# Patient Record
Sex: FEMALE | Employment: OTHER | URBAN - METROPOLITAN AREA
[De-identification: names, ages, dates, MRNs, and addresses within clinical notes are randomized per-mention and may not be internally consistent; named-entity substitution may affect disease eponyms.]

---

## 2019-01-01 ENCOUNTER — HOSPITAL ENCOUNTER (OUTPATIENT)
Age: 84
Setting detail: SPECIMEN
Discharge: HOME | End: 2019-10-30
Payer: MEDICARE

## 2019-01-01 ENCOUNTER — HOSPITAL ENCOUNTER (OUTPATIENT)
Age: 84
Setting detail: SPECIMEN
Discharge: HOME | End: 2019-11-04
Payer: MEDICARE

## 2019-01-01 ENCOUNTER — HOSPITAL ENCOUNTER (OUTPATIENT)
Age: 84
Setting detail: SPECIMEN
Discharge: HOME | End: 2019-10-24
Payer: MEDICARE

## 2019-01-01 LAB
ANION GAP SERPL CALCULATED.3IONS-SCNC: 13 MMOL/L (ref 9–17)
ANION GAP SERPL CALCULATED.3IONS-SCNC: 15 MMOL/L (ref 9–17)
BUN BLDV-MCNC: 43 MG/DL (ref 8–23)
BUN BLDV-MCNC: 56 MG/DL (ref 8–23)
BUN/CREAT BLD: 32 (ref 9–20)
BUN/CREAT BLD: 40 (ref 9–20)
CALCIUM SERPL-MCNC: 9.5 MG/DL (ref 8.6–10.4)
CALCIUM SERPL-MCNC: 9.6 MG/DL (ref 8.6–10.4)
CHLORIDE BLD-SCNC: 92 MMOL/L (ref 98–107)
CHLORIDE BLD-SCNC: 96 MMOL/L (ref 98–107)
CO2: 28 MMOL/L (ref 20–31)
CO2: 31 MMOL/L (ref 20–31)
CREAT SERPL-MCNC: 1.34 MG/DL (ref 0.5–0.9)
CREAT SERPL-MCNC: 1.39 MG/DL (ref 0.5–0.9)
GFR AFRICAN AMERICAN: 43 ML/MIN
GFR AFRICAN AMERICAN: 44 ML/MIN
GFR NON-AFRICAN AMERICAN: 35 ML/MIN
GFR NON-AFRICAN AMERICAN: 37 ML/MIN
GFR SERPL CREATININE-BSD FRML MDRD: ABNORMAL ML/MIN/{1.73_M2}
GLUCOSE BLD-MCNC: 129 MG/DL (ref 70–99)
GLUCOSE BLD-MCNC: 76 MG/DL (ref 70–99)
HCT VFR BLD CALC: 34.3 % (ref 36.3–47.1)
HCT VFR BLD CALC: 37.4 % (ref 36.3–47.1)
HEMOGLOBIN: 11.5 G/DL (ref 11.9–15.1)
HEMOGLOBIN: 12.5 G/DL (ref 11.9–15.1)
MCH RBC QN AUTO: 31.9 PG (ref 25.2–33.5)
MCH RBC QN AUTO: 32.2 PG (ref 25.2–33.5)
MCHC RBC AUTO-ENTMCNC: 33.4 G/DL (ref 28.4–34.8)
MCHC RBC AUTO-ENTMCNC: 33.5 G/DL (ref 28.4–34.8)
MCV RBC AUTO: 95.4 FL (ref 82.6–102.9)
MCV RBC AUTO: 96.1 FL (ref 82.6–102.9)
NRBC AUTOMATED: 0 PER 100 WBC
NRBC AUTOMATED: 0 PER 100 WBC
PDW BLD-RTO: 13.2 % (ref 11.8–14.4)
PDW BLD-RTO: 13.5 % (ref 11.8–14.4)
PLATELET # BLD: 236 K/UL (ref 138–453)
PLATELET # BLD: 245 K/UL (ref 138–453)
PMV BLD AUTO: 11.6 FL (ref 8.1–13.5)
PMV BLD AUTO: 12 FL (ref 8.1–13.5)
POTASSIUM SERPL-SCNC: 3.2 MMOL/L (ref 3.7–5.3)
POTASSIUM SERPL-SCNC: 3.3 MMOL/L (ref 3.7–5.3)
POTASSIUM SERPL-SCNC: 3.3 MMOL/L (ref 3.7–5.3)
POTASSIUM SERPL-SCNC: 3.5 MMOL/L (ref 3.7–5.3)
RBC # BLD: 3.57 M/UL (ref 3.95–5.11)
RBC # BLD: 3.92 M/UL (ref 3.95–5.11)
SODIUM BLD-SCNC: 136 MMOL/L (ref 135–144)
SODIUM BLD-SCNC: 139 MMOL/L (ref 135–144)
WBC # BLD: 5.5 K/UL (ref 3.5–11.3)
WBC # BLD: 5.9 K/UL (ref 3.5–11.3)

## 2019-01-01 PROCEDURE — 80048 BASIC METABOLIC PNL TOTAL CA: CPT

## 2019-01-01 PROCEDURE — 85027 COMPLETE CBC AUTOMATED: CPT

## 2019-01-01 PROCEDURE — P9603 ONE-WAY ALLOW PRORATED MILES: HCPCS

## 2019-01-01 PROCEDURE — 84132 ASSAY OF SERUM POTASSIUM: CPT

## 2019-01-01 PROCEDURE — 36415 COLL VENOUS BLD VENIPUNCTURE: CPT

## 2019-05-31 ENCOUNTER — HOSPITAL ENCOUNTER (OUTPATIENT)
Age: 84
Setting detail: SPECIMEN
Discharge: HOME | End: 2019-05-31
Payer: MEDICARE

## 2019-05-31 LAB
ANION GAP SERPL CALCULATED.3IONS-SCNC: 12 MMOL/L (ref 9–17)
BUN BLDV-MCNC: 46 MG/DL (ref 8–23)
BUN/CREAT BLD: ABNORMAL (ref 9–20)
CALCIUM SERPL-MCNC: 9.5 MG/DL (ref 8.6–10.4)
CHLORIDE BLD-SCNC: 98 MMOL/L (ref 98–107)
CO2: 27 MMOL/L (ref 20–31)
CREAT SERPL-MCNC: 1.22 MG/DL (ref 0.5–0.9)
ESTIMATED AVERAGE GLUCOSE: 123 MG/DL
GFR AFRICAN AMERICAN: 50 ML/MIN
GFR NON-AFRICAN AMERICAN: 41 ML/MIN
GFR SERPL CREATININE-BSD FRML MDRD: ABNORMAL ML/MIN/{1.73_M2}
GFR SERPL CREATININE-BSD FRML MDRD: ABNORMAL ML/MIN/{1.73_M2}
GLUCOSE BLD-MCNC: 73 MG/DL (ref 70–99)
HBA1C MFR BLD: 5.9 % (ref 4–6)
HCT VFR BLD CALC: 36.5 % (ref 36.3–47.1)
HEMOGLOBIN: 11.6 G/DL (ref 11.9–15.1)
MCH RBC QN AUTO: 31 PG (ref 25.2–33.5)
MCHC RBC AUTO-ENTMCNC: 31.8 G/DL (ref 28.4–34.8)
MCV RBC AUTO: 97.6 FL (ref 82.6–102.9)
NRBC AUTOMATED: 0 PER 100 WBC
PDW BLD-RTO: 14.6 % (ref 11.8–14.4)
PLATELET # BLD: 239 K/UL (ref 138–453)
PMV BLD AUTO: 11.6 FL (ref 8.1–13.5)
POTASSIUM SERPL-SCNC: 3.8 MMOL/L (ref 3.7–5.3)
RBC # BLD: 3.74 M/UL (ref 3.95–5.11)
SODIUM BLD-SCNC: 137 MMOL/L (ref 135–144)
T4 TOTAL: 6.9 UG/DL (ref 4.5–12)
TSH SERPL DL<=0.05 MIU/L-ACNC: 3.3 MIU/L (ref 0.3–5)
WBC # BLD: 5.3 K/UL (ref 3.5–11.3)

## 2019-05-31 PROCEDURE — 84436 ASSAY OF TOTAL THYROXINE: CPT

## 2019-05-31 PROCEDURE — 36415 COLL VENOUS BLD VENIPUNCTURE: CPT

## 2019-05-31 PROCEDURE — P9603 ONE-WAY ALLOW PRORATED MILES: HCPCS

## 2019-05-31 PROCEDURE — 85027 COMPLETE CBC AUTOMATED: CPT

## 2019-05-31 PROCEDURE — 83036 HEMOGLOBIN GLYCOSYLATED A1C: CPT

## 2019-05-31 PROCEDURE — 84443 ASSAY THYROID STIM HORMONE: CPT

## 2019-05-31 PROCEDURE — 80048 BASIC METABOLIC PNL TOTAL CA: CPT

## 2019-07-01 ENCOUNTER — HOSPITAL ENCOUNTER (OUTPATIENT)
Age: 84
Setting detail: SPECIMEN
Discharge: HOME | End: 2019-07-01
Payer: MEDICARE

## 2019-07-01 LAB
ANION GAP SERPL CALCULATED.3IONS-SCNC: 17 MMOL/L (ref 9–17)
BNP INTERPRETATION: ABNORMAL
BUN BLDV-MCNC: 45 MG/DL (ref 8–23)
BUN/CREAT BLD: 41 (ref 9–20)
CALCIUM SERPL-MCNC: 9.8 MG/DL (ref 8.6–10.4)
CHLORIDE BLD-SCNC: 94 MMOL/L (ref 98–107)
CO2: 24 MMOL/L (ref 20–31)
CREAT SERPL-MCNC: 1.1 MG/DL (ref 0.5–0.9)
GFR AFRICAN AMERICAN: 56 ML/MIN
GFR NON-AFRICAN AMERICAN: 46 ML/MIN
GFR SERPL CREATININE-BSD FRML MDRD: ABNORMAL ML/MIN/{1.73_M2}
GFR SERPL CREATININE-BSD FRML MDRD: ABNORMAL ML/MIN/{1.73_M2}
GLUCOSE BLD-MCNC: 192 MG/DL (ref 70–99)
HCT VFR BLD CALC: 38.3 % (ref 36.3–47.1)
HEMOGLOBIN: 12.6 G/DL (ref 11.9–15.1)
MCH RBC QN AUTO: 30.7 PG (ref 25.2–33.5)
MCHC RBC AUTO-ENTMCNC: 32.9 G/DL (ref 28.4–34.8)
MCV RBC AUTO: 93.2 FL (ref 82.6–102.9)
NRBC AUTOMATED: 0 PER 100 WBC
PDW BLD-RTO: 14.9 % (ref 11.8–14.4)
PLATELET # BLD: 259 K/UL (ref 138–453)
PMV BLD AUTO: 12 FL (ref 8.1–13.5)
POTASSIUM SERPL-SCNC: 3.1 MMOL/L (ref 3.7–5.3)
PRO-BNP: 3375 PG/ML
RBC # BLD: 4.11 M/UL (ref 3.95–5.11)
SODIUM BLD-SCNC: 135 MMOL/L (ref 135–144)
WBC # BLD: 13.8 K/UL (ref 3.5–11.3)

## 2019-07-01 PROCEDURE — 85027 COMPLETE CBC AUTOMATED: CPT

## 2019-07-01 PROCEDURE — 83880 ASSAY OF NATRIURETIC PEPTIDE: CPT

## 2019-07-01 PROCEDURE — 80048 BASIC METABOLIC PNL TOTAL CA: CPT

## 2019-07-01 PROCEDURE — 36415 COLL VENOUS BLD VENIPUNCTURE: CPT

## 2019-07-01 PROCEDURE — P9603 ONE-WAY ALLOW PRORATED MILES: HCPCS

## 2019-07-03 ENCOUNTER — HOSPITAL ENCOUNTER (OUTPATIENT)
Age: 84
Setting detail: SPECIMEN
Discharge: HOME | End: 2019-07-03
Payer: MEDICARE

## 2019-07-03 LAB
ANION GAP SERPL CALCULATED.3IONS-SCNC: 17 MMOL/L (ref 9–17)
BUN BLDV-MCNC: 46 MG/DL (ref 8–23)
BUN/CREAT BLD: 36 (ref 9–20)
CALCIUM SERPL-MCNC: 9.8 MG/DL (ref 8.6–10.4)
CHLORIDE BLD-SCNC: 95 MMOL/L (ref 98–107)
CO2: 26 MMOL/L (ref 20–31)
CREAT SERPL-MCNC: 1.28 MG/DL (ref 0.5–0.9)
GFR AFRICAN AMERICAN: 47 ML/MIN
GFR NON-AFRICAN AMERICAN: 39 ML/MIN
GFR SERPL CREATININE-BSD FRML MDRD: ABNORMAL ML/MIN/{1.73_M2}
GFR SERPL CREATININE-BSD FRML MDRD: ABNORMAL ML/MIN/{1.73_M2}
GLUCOSE BLD-MCNC: 215 MG/DL (ref 70–99)
HCT VFR BLD CALC: 38.5 % (ref 36.3–47.1)
HEMOGLOBIN: 12.6 G/DL (ref 11.9–15.1)
MCH RBC QN AUTO: 30.2 PG (ref 25.2–33.5)
MCHC RBC AUTO-ENTMCNC: 32.7 G/DL (ref 28.4–34.8)
MCV RBC AUTO: 92.3 FL (ref 82.6–102.9)
NRBC AUTOMATED: 0 PER 100 WBC
PDW BLD-RTO: 15 % (ref 11.8–14.4)
PLATELET # BLD: 288 K/UL (ref 138–453)
PMV BLD AUTO: 11.9 FL (ref 8.1–13.5)
POTASSIUM SERPL-SCNC: 3.6 MMOL/L (ref 3.7–5.3)
RBC # BLD: 4.17 M/UL (ref 3.95–5.11)
SODIUM BLD-SCNC: 138 MMOL/L (ref 135–144)
WBC # BLD: 10.1 K/UL (ref 3.5–11.3)

## 2019-07-03 PROCEDURE — 85027 COMPLETE CBC AUTOMATED: CPT

## 2019-07-03 PROCEDURE — P9603 ONE-WAY ALLOW PRORATED MILES: HCPCS

## 2019-07-03 PROCEDURE — 36415 COLL VENOUS BLD VENIPUNCTURE: CPT

## 2019-07-03 PROCEDURE — 80048 BASIC METABOLIC PNL TOTAL CA: CPT

## 2019-07-05 ENCOUNTER — HOSPITAL ENCOUNTER (OUTPATIENT)
Age: 84
Setting detail: SPECIMEN
Discharge: HOME | End: 2019-07-05
Payer: MEDICARE

## 2019-07-05 LAB
ANION GAP SERPL CALCULATED.3IONS-SCNC: 15 MMOL/L (ref 9–17)
BNP INTERPRETATION: ABNORMAL
BUN BLDV-MCNC: 50 MG/DL (ref 8–23)
BUN/CREAT BLD: 36 (ref 9–20)
CALCIUM SERPL-MCNC: 9.3 MG/DL (ref 8.6–10.4)
CHLORIDE BLD-SCNC: 91 MMOL/L (ref 98–107)
CO2: 29 MMOL/L (ref 20–31)
CREAT SERPL-MCNC: 1.4 MG/DL (ref 0.5–0.9)
GFR AFRICAN AMERICAN: 42 ML/MIN
GFR NON-AFRICAN AMERICAN: 35 ML/MIN
GFR SERPL CREATININE-BSD FRML MDRD: ABNORMAL ML/MIN/{1.73_M2}
GFR SERPL CREATININE-BSD FRML MDRD: ABNORMAL ML/MIN/{1.73_M2}
GLUCOSE BLD-MCNC: 228 MG/DL (ref 70–99)
HCT VFR BLD CALC: 39.2 % (ref 36.3–47.1)
HEMOGLOBIN: 12.7 G/DL (ref 11.9–15.1)
MCH RBC QN AUTO: 30.1 PG (ref 25.2–33.5)
MCHC RBC AUTO-ENTMCNC: 32.4 G/DL (ref 28.4–34.8)
MCV RBC AUTO: 92.9 FL (ref 82.6–102.9)
NRBC AUTOMATED: 0 PER 100 WBC
PDW BLD-RTO: 15.3 % (ref 11.8–14.4)
PLATELET # BLD: 264 K/UL (ref 138–453)
PMV BLD AUTO: 12.2 FL (ref 8.1–13.5)
POTASSIUM SERPL-SCNC: 3.5 MMOL/L (ref 3.7–5.3)
PRO-BNP: 1083 PG/ML
RBC # BLD: 4.22 M/UL (ref 3.95–5.11)
SODIUM BLD-SCNC: 135 MMOL/L (ref 135–144)
WBC # BLD: 10.3 K/UL (ref 3.5–11.3)

## 2019-07-05 PROCEDURE — P9603 ONE-WAY ALLOW PRORATED MILES: HCPCS

## 2019-07-05 PROCEDURE — 83880 ASSAY OF NATRIURETIC PEPTIDE: CPT

## 2019-07-05 PROCEDURE — 36415 COLL VENOUS BLD VENIPUNCTURE: CPT

## 2019-07-05 PROCEDURE — 85027 COMPLETE CBC AUTOMATED: CPT

## 2019-07-05 PROCEDURE — 80048 BASIC METABOLIC PNL TOTAL CA: CPT

## 2019-07-08 ENCOUNTER — HOSPITAL ENCOUNTER (OUTPATIENT)
Age: 84
Setting detail: SPECIMEN
Discharge: HOME | End: 2019-07-08
Payer: MEDICARE

## 2019-07-08 LAB
ANION GAP SERPL CALCULATED.3IONS-SCNC: 20 MMOL/L (ref 9–17)
BNP INTERPRETATION: ABNORMAL
BUN BLDV-MCNC: 53 MG/DL (ref 8–23)
BUN/CREAT BLD: 31 (ref 9–20)
CALCIUM SERPL-MCNC: 9.6 MG/DL (ref 8.6–10.4)
CHLORIDE BLD-SCNC: 88 MMOL/L (ref 98–107)
CO2: 28 MMOL/L (ref 20–31)
CREAT SERPL-MCNC: 1.72 MG/DL (ref 0.5–0.9)
GFR AFRICAN AMERICAN: 33 ML/MIN
GFR NON-AFRICAN AMERICAN: 28 ML/MIN
GFR SERPL CREATININE-BSD FRML MDRD: ABNORMAL ML/MIN/{1.73_M2}
GFR SERPL CREATININE-BSD FRML MDRD: ABNORMAL ML/MIN/{1.73_M2}
GLUCOSE BLD-MCNC: 212 MG/DL (ref 70–99)
HCT VFR BLD CALC: 43.6 % (ref 36.3–47.1)
HEMOGLOBIN: 14.1 G/DL (ref 11.9–15.1)
MCH RBC QN AUTO: 29.9 PG (ref 25.2–33.5)
MCHC RBC AUTO-ENTMCNC: 32.3 G/DL (ref 28.4–34.8)
MCV RBC AUTO: 92.6 FL (ref 82.6–102.9)
NRBC AUTOMATED: 0 PER 100 WBC
PDW BLD-RTO: 14.8 % (ref 11.8–14.4)
PLATELET # BLD: 346 K/UL (ref 138–453)
PMV BLD AUTO: 11.8 FL (ref 8.1–13.5)
POTASSIUM SERPL-SCNC: 3.7 MMOL/L (ref 3.7–5.3)
PRO-BNP: 916 PG/ML
RBC # BLD: 4.71 M/UL (ref 3.95–5.11)
SODIUM BLD-SCNC: 136 MMOL/L (ref 135–144)
WBC # BLD: 10.6 K/UL (ref 3.5–11.3)

## 2019-07-08 PROCEDURE — 83880 ASSAY OF NATRIURETIC PEPTIDE: CPT

## 2019-07-08 PROCEDURE — 36415 COLL VENOUS BLD VENIPUNCTURE: CPT

## 2019-07-08 PROCEDURE — 80048 BASIC METABOLIC PNL TOTAL CA: CPT

## 2019-07-08 PROCEDURE — P9603 ONE-WAY ALLOW PRORATED MILES: HCPCS

## 2019-07-08 PROCEDURE — 85027 COMPLETE CBC AUTOMATED: CPT

## 2019-07-29 ENCOUNTER — HOSPITAL ENCOUNTER (OUTPATIENT)
Age: 84
Setting detail: SPECIMEN
Discharge: HOME | End: 2019-07-29
Payer: MEDICARE

## 2019-07-29 LAB
ANION GAP SERPL CALCULATED.3IONS-SCNC: 18 MMOL/L (ref 9–17)
BNP INTERPRETATION: ABNORMAL
BUN BLDV-MCNC: 69 MG/DL (ref 8–23)
BUN/CREAT BLD: ABNORMAL (ref 9–20)
CALCIUM SERPL-MCNC: 9.5 MG/DL (ref 8.6–10.4)
CHLORIDE BLD-SCNC: 89 MMOL/L (ref 98–107)
CO2: 26 MMOL/L (ref 20–31)
CREAT SERPL-MCNC: 1.91 MG/DL (ref 0.5–0.9)
GFR AFRICAN AMERICAN: 30 ML/MIN
GFR NON-AFRICAN AMERICAN: 24 ML/MIN
GFR SERPL CREATININE-BSD FRML MDRD: ABNORMAL ML/MIN/{1.73_M2}
GFR SERPL CREATININE-BSD FRML MDRD: ABNORMAL ML/MIN/{1.73_M2}
GLUCOSE BLD-MCNC: 282 MG/DL (ref 70–99)
HCT VFR BLD CALC: 40.5 % (ref 36.3–47.1)
HEMOGLOBIN: 13.4 G/DL (ref 11.9–15.1)
MCH RBC QN AUTO: 31.9 PG (ref 25.2–33.5)
MCHC RBC AUTO-ENTMCNC: 33.1 G/DL (ref 28.4–34.8)
MCV RBC AUTO: 96.4 FL (ref 82.6–102.9)
NRBC AUTOMATED: 0 PER 100 WBC
PDW BLD-RTO: 14.5 % (ref 11.8–14.4)
PLATELET # BLD: 263 K/UL (ref 138–453)
PMV BLD AUTO: 12.8 FL (ref 8.1–13.5)
POTASSIUM SERPL-SCNC: 3.7 MMOL/L (ref 3.7–5.3)
PRO-BNP: 1115 PG/ML
RBC # BLD: 4.2 M/UL (ref 3.95–5.11)
SODIUM BLD-SCNC: 133 MMOL/L (ref 135–144)
WBC # BLD: 6.4 K/UL (ref 3.5–11.3)

## 2019-07-29 PROCEDURE — 83880 ASSAY OF NATRIURETIC PEPTIDE: CPT

## 2019-07-29 PROCEDURE — 80048 BASIC METABOLIC PNL TOTAL CA: CPT

## 2019-07-29 PROCEDURE — P9603 ONE-WAY ALLOW PRORATED MILES: HCPCS

## 2019-07-29 PROCEDURE — 36415 COLL VENOUS BLD VENIPUNCTURE: CPT

## 2019-07-29 PROCEDURE — 85027 COMPLETE CBC AUTOMATED: CPT

## 2019-07-31 ENCOUNTER — HOSPITAL ENCOUNTER (OUTPATIENT)
Age: 84
Setting detail: SPECIMEN
Discharge: HOME | End: 2019-07-31
Payer: MEDICARE

## 2019-07-31 LAB
ANION GAP SERPL CALCULATED.3IONS-SCNC: 16 MMOL/L (ref 9–17)
BUN BLDV-MCNC: 70 MG/DL (ref 8–23)
BUN/CREAT BLD: ABNORMAL (ref 9–20)
CALCIUM SERPL-MCNC: 9.4 MG/DL (ref 8.6–10.4)
CHLORIDE BLD-SCNC: 92 MMOL/L (ref 98–107)
CO2: 29 MMOL/L (ref 20–31)
CREAT SERPL-MCNC: 1.84 MG/DL (ref 0.5–0.9)
GFR AFRICAN AMERICAN: 31 ML/MIN
GFR NON-AFRICAN AMERICAN: 25 ML/MIN
GFR SERPL CREATININE-BSD FRML MDRD: ABNORMAL ML/MIN/{1.73_M2}
GFR SERPL CREATININE-BSD FRML MDRD: ABNORMAL ML/MIN/{1.73_M2}
GLUCOSE BLD-MCNC: 90 MG/DL (ref 70–99)
POTASSIUM SERPL-SCNC: 3.8 MMOL/L (ref 3.7–5.3)
SODIUM BLD-SCNC: 137 MMOL/L (ref 135–144)

## 2019-07-31 PROCEDURE — P9603 ONE-WAY ALLOW PRORATED MILES: HCPCS

## 2019-07-31 PROCEDURE — 80048 BASIC METABOLIC PNL TOTAL CA: CPT

## 2019-07-31 PROCEDURE — 36415 COLL VENOUS BLD VENIPUNCTURE: CPT

## 2019-08-06 ENCOUNTER — HOSPITAL ENCOUNTER (OUTPATIENT)
Age: 84
Setting detail: SPECIMEN
Discharge: HOME | End: 2019-08-06
Payer: MEDICARE

## 2019-08-06 LAB
ANION GAP SERPL CALCULATED.3IONS-SCNC: 15 MMOL/L (ref 9–17)
BUN BLDV-MCNC: 65 MG/DL (ref 8–23)
BUN/CREAT BLD: ABNORMAL (ref 9–20)
CALCIUM SERPL-MCNC: 9.4 MG/DL (ref 8.6–10.4)
CHLORIDE BLD-SCNC: 88 MMOL/L (ref 98–107)
CO2: 30 MMOL/L (ref 20–31)
CREAT SERPL-MCNC: 1.82 MG/DL (ref 0.5–0.9)
GFR AFRICAN AMERICAN: 31 ML/MIN
GFR NON-AFRICAN AMERICAN: 26 ML/MIN
GFR SERPL CREATININE-BSD FRML MDRD: ABNORMAL ML/MIN/{1.73_M2}
GFR SERPL CREATININE-BSD FRML MDRD: ABNORMAL ML/MIN/{1.73_M2}
GLUCOSE BLD-MCNC: 201 MG/DL (ref 70–99)
POTASSIUM SERPL-SCNC: 3.8 MMOL/L (ref 3.7–5.3)
SODIUM BLD-SCNC: 133 MMOL/L (ref 135–144)

## 2019-08-06 PROCEDURE — 80048 BASIC METABOLIC PNL TOTAL CA: CPT

## 2019-08-06 PROCEDURE — P9603 ONE-WAY ALLOW PRORATED MILES: HCPCS

## 2019-08-06 PROCEDURE — 36415 COLL VENOUS BLD VENIPUNCTURE: CPT

## 2020-01-01 ENCOUNTER — APPOINTMENT (OUTPATIENT)
Dept: CT IMAGING | Age: 85
DRG: 871 | End: 2020-01-01
Payer: MEDICARE

## 2020-01-01 ENCOUNTER — HOSPITAL ENCOUNTER (INPATIENT)
Age: 85
LOS: 1 days | DRG: 177 | End: 2020-08-15
Attending: INTERNAL MEDICINE | Admitting: INTERNAL MEDICINE
Payer: COMMERCIAL

## 2020-01-01 ENCOUNTER — APPOINTMENT (OUTPATIENT)
Dept: GENERAL RADIOLOGY | Age: 85
DRG: 871 | End: 2020-01-01
Payer: MEDICARE

## 2020-01-01 ENCOUNTER — HOSPITAL ENCOUNTER (INPATIENT)
Age: 85
LOS: 8 days | Discharge: HOSPICE | DRG: 871 | End: 2020-08-14
Attending: EMERGENCY MEDICINE | Admitting: INTERNAL MEDICINE
Payer: MEDICARE

## 2020-01-01 VITALS
HEART RATE: 114 BPM | DIASTOLIC BLOOD PRESSURE: 31 MMHG | SYSTOLIC BLOOD PRESSURE: 112 MMHG | TEMPERATURE: 98.9 F | OXYGEN SATURATION: 86 % | RESPIRATION RATE: 44 BRPM

## 2020-01-01 VITALS
WEIGHT: 169.75 LBS | RESPIRATION RATE: 28 BRPM | BODY MASS INDEX: 30.08 KG/M2 | SYSTOLIC BLOOD PRESSURE: 120 MMHG | DIASTOLIC BLOOD PRESSURE: 55 MMHG | TEMPERATURE: 97.3 F | HEIGHT: 63 IN | OXYGEN SATURATION: 98 % | HEART RATE: 89 BPM

## 2020-01-01 LAB
-: NORMAL
ABO/RH: NORMAL
ABSOLUTE EOS #: 0 K/UL (ref 0–0.4)
ABSOLUTE EOS #: 0 K/UL (ref 0–0.44)
ABSOLUTE EOS #: 0.08 K/UL (ref 0–0.44)
ABSOLUTE EOS #: <0.03 K/UL (ref 0–0.44)
ABSOLUTE IMMATURE GRANULOCYTE: 0.1 K/UL (ref 0–0.3)
ABSOLUTE IMMATURE GRANULOCYTE: 0.23 K/UL (ref 0–0.3)
ABSOLUTE IMMATURE GRANULOCYTE: 0.29 K/UL (ref 0–0.3)
ABSOLUTE IMMATURE GRANULOCYTE: 0.32 K/UL (ref 0–0.3)
ABSOLUTE IMMATURE GRANULOCYTE: 0.44 K/UL (ref 0–0.3)
ABSOLUTE IMMATURE GRANULOCYTE: 0.83 K/UL (ref 0–0.3)
ABSOLUTE LYMPH #: 0.1 K/UL (ref 1–4.8)
ABSOLUTE LYMPH #: 0.2 K/UL (ref 1–4.8)
ABSOLUTE LYMPH #: 0.22 K/UL (ref 1.1–3.7)
ABSOLUTE LYMPH #: 0.39 K/UL (ref 1.1–3.7)
ABSOLUTE LYMPH #: 0.5 K/UL (ref 1.1–3.7)
ABSOLUTE LYMPH #: 0.64 K/UL (ref 1–4.8)
ABSOLUTE MONO #: 0.29 K/UL (ref 0.1–0.8)
ABSOLUTE MONO #: 0.43 K/UL (ref 0.1–0.8)
ABSOLUTE MONO #: 0.46 K/UL (ref 0.1–1.2)
ABSOLUTE MONO #: 0.59 K/UL (ref 0.1–0.8)
ABSOLUTE MONO #: 0.7 K/UL (ref 0.1–1.2)
ABSOLUTE MONO #: 0.83 K/UL (ref 0.1–1.2)
ACTION: NORMAL
ALBUMIN SERPL-MCNC: 2 G/DL (ref 3.5–5.2)
ALBUMIN SERPL-MCNC: 2.1 G/DL (ref 3.5–5.2)
ALBUMIN SERPL-MCNC: 2.1 G/DL (ref 3.5–5.2)
ALBUMIN SERPL-MCNC: 2.4 G/DL (ref 3.5–5.2)
ALBUMIN SERPL-MCNC: 2.5 G/DL (ref 3.5–5.2)
ALBUMIN SERPL-MCNC: 2.6 G/DL (ref 3.5–5.2)
ALBUMIN SERPL-MCNC: 2.7 G/DL (ref 3.5–5.2)
ALBUMIN SERPL-MCNC: 2.9 G/DL (ref 3.5–5.2)
ALBUMIN/GLOBULIN RATIO: 0.8 (ref 1–2.5)
ALBUMIN/GLOBULIN RATIO: 0.8 (ref 1–2.5)
ALBUMIN/GLOBULIN RATIO: 0.9 (ref 1–2.5)
ALBUMIN/GLOBULIN RATIO: 1.1 (ref 1–2.5)
ALBUMIN/GLOBULIN RATIO: 1.2 (ref 1–2.5)
ALBUMIN/GLOBULIN RATIO: 1.3 (ref 1–2.5)
ALLEN TEST: ABNORMAL
ALLEN TEST: POSITIVE
ALP BLD-CCNC: 118 U/L (ref 35–104)
ALP BLD-CCNC: 118 U/L (ref 35–104)
ALP BLD-CCNC: 120 U/L (ref 35–104)
ALP BLD-CCNC: 122 U/L (ref 35–104)
ALP BLD-CCNC: 138 U/L (ref 35–104)
ALP BLD-CCNC: 138 U/L (ref 35–104)
ALP BLD-CCNC: 144 U/L (ref 35–104)
ALP BLD-CCNC: 158 U/L (ref 35–104)
ALT SERPL-CCNC: 30 U/L (ref 5–33)
ALT SERPL-CCNC: 33 U/L (ref 5–33)
ALT SERPL-CCNC: 33 U/L (ref 5–33)
ALT SERPL-CCNC: 39 U/L (ref 5–33)
ALT SERPL-CCNC: 43 U/L (ref 5–33)
ALT SERPL-CCNC: 47 U/L (ref 5–33)
ALT SERPL-CCNC: 51 U/L (ref 5–33)
ALT SERPL-CCNC: 70 U/L (ref 5–33)
AMORPHOUS: NORMAL
ANION GAP SERPL CALCULATED.3IONS-SCNC: 10 MMOL/L (ref 9–17)
ANION GAP SERPL CALCULATED.3IONS-SCNC: 11 MMOL/L (ref 9–17)
ANION GAP SERPL CALCULATED.3IONS-SCNC: 11 MMOL/L (ref 9–17)
ANION GAP SERPL CALCULATED.3IONS-SCNC: 12 MMOL/L (ref 9–17)
ANION GAP SERPL CALCULATED.3IONS-SCNC: 14 MMOL/L (ref 9–17)
ANION GAP SERPL CALCULATED.3IONS-SCNC: 15 MMOL/L (ref 9–17)
ANION GAP SERPL CALCULATED.3IONS-SCNC: 19 MMOL/L (ref 9–17)
ANION GAP SERPL CALCULATED.3IONS-SCNC: 8 MMOL/L (ref 9–17)
ANION GAP: 12 MMOL/L (ref 7–16)
AST SERPL-CCNC: 28 U/L
AST SERPL-CCNC: 29 U/L
AST SERPL-CCNC: 30 U/L
AST SERPL-CCNC: 41 U/L
AST SERPL-CCNC: 45 U/L
AST SERPL-CCNC: 64 U/L
AST SERPL-CCNC: 73 U/L
AST SERPL-CCNC: 83 U/L
BACTERIA: NORMAL
BASOPHILS # BLD: 0 % (ref 0–2)
BASOPHILS # BLD: 1 % (ref 0–2)
BASOPHILS ABSOLUTE: 0 K/UL (ref 0–0.2)
BASOPHILS ABSOLUTE: 0.1 K/UL (ref 0–0.2)
BASOPHILS ABSOLUTE: <0.03 K/UL (ref 0–0.2)
BILIRUB SERPL-MCNC: 0.41 MG/DL (ref 0.3–1.2)
BILIRUB SERPL-MCNC: 0.43 MG/DL (ref 0.3–1.2)
BILIRUB SERPL-MCNC: 0.44 MG/DL (ref 0.3–1.2)
BILIRUB SERPL-MCNC: 0.49 MG/DL (ref 0.3–1.2)
BILIRUB SERPL-MCNC: 0.54 MG/DL (ref 0.3–1.2)
BILIRUB SERPL-MCNC: 0.62 MG/DL (ref 0.3–1.2)
BILIRUB SERPL-MCNC: 0.65 MG/DL (ref 0.3–1.2)
BILIRUB SERPL-MCNC: 0.68 MG/DL (ref 0.3–1.2)
BILIRUBIN DIRECT: 0.3 MG/DL
BILIRUBIN URINE: NEGATIVE
BILIRUBIN, INDIRECT: 0.38 MG/DL (ref 0–1)
BLD PROD TYP BPU: NORMAL
BLD PROD TYP BPU: NORMAL
BNP INTERPRETATION: ABNORMAL
BUN BLDV-MCNC: 30 MG/DL (ref 8–23)
BUN BLDV-MCNC: 31 MG/DL (ref 8–23)
BUN BLDV-MCNC: 33 MG/DL (ref 8–23)
BUN BLDV-MCNC: 37 MG/DL (ref 8–23)
BUN BLDV-MCNC: 54 MG/DL (ref 8–23)
BUN BLDV-MCNC: 74 MG/DL (ref 8–23)
BUN BLDV-MCNC: 82 MG/DL (ref 8–23)
BUN BLDV-MCNC: 83 MG/DL (ref 8–23)
BUN/CREAT BLD: ABNORMAL (ref 9–20)
C-REACTIVE PROTEIN: 186.9 MG/L (ref 0–5)
CALCIUM SERPL-MCNC: 8.3 MG/DL (ref 8.6–10.4)
CALCIUM SERPL-MCNC: 8.5 MG/DL (ref 8.6–10.4)
CALCIUM SERPL-MCNC: 8.6 MG/DL (ref 8.6–10.4)
CALCIUM SERPL-MCNC: 8.7 MG/DL (ref 8.6–10.4)
CALCIUM SERPL-MCNC: 8.8 MG/DL (ref 8.6–10.4)
CALCIUM SERPL-MCNC: 8.9 MG/DL (ref 8.6–10.4)
CALCIUM SERPL-MCNC: 9 MG/DL (ref 8.6–10.4)
CALCIUM SERPL-MCNC: 9.1 MG/DL (ref 8.6–10.4)
CASTS UA: NORMAL /LPF (ref 0–8)
CHLORIDE BLD-SCNC: 100 MMOL/L (ref 98–107)
CHLORIDE BLD-SCNC: 101 MMOL/L (ref 98–107)
CHLORIDE BLD-SCNC: 105 MMOL/L (ref 98–107)
CHLORIDE BLD-SCNC: 107 MMOL/L (ref 98–107)
CHLORIDE BLD-SCNC: 109 MMOL/L (ref 98–107)
CHLORIDE BLD-SCNC: 110 MMOL/L (ref 98–107)
CHLORIDE BLD-SCNC: 110 MMOL/L (ref 98–107)
CHLORIDE BLD-SCNC: 112 MMOL/L (ref 98–107)
CO2: 17 MMOL/L (ref 20–31)
CO2: 18 MMOL/L (ref 20–31)
CO2: 20 MMOL/L (ref 20–31)
CO2: 21 MMOL/L (ref 20–31)
CO2: 22 MMOL/L (ref 20–31)
COLOR: YELLOW
COMMENT UA: ABNORMAL
CORTISOL COLLECTION INFO: ABNORMAL
CORTISOL: 25.1 UG/DL (ref 2.7–18.4)
CREAT SERPL-MCNC: 1.08 MG/DL (ref 0.5–0.9)
CREAT SERPL-MCNC: 1.11 MG/DL (ref 0.5–0.9)
CREAT SERPL-MCNC: 1.28 MG/DL (ref 0.5–0.9)
CREAT SERPL-MCNC: 1.38 MG/DL (ref 0.5–0.9)
CREAT SERPL-MCNC: 1.72 MG/DL (ref 0.5–0.9)
CREAT SERPL-MCNC: 2.11 MG/DL (ref 0.5–0.9)
CREAT SERPL-MCNC: 2.59 MG/DL (ref 0.5–0.9)
CREAT SERPL-MCNC: 2.59 MG/DL (ref 0.5–0.9)
CRYSTALS, UA: NORMAL /HPF
CULTURE: NORMAL
CULTURE: NORMAL
D-DIMER QUANTITATIVE: 1.31 MG/L FEU
D-DIMER QUANTITATIVE: 1.85 MG/L FEU
D-DIMER QUANTITATIVE: 3.53 MG/L FEU
D-DIMER QUANTITATIVE: 4.56 MG/L FEU
DATE AND TIME: NORMAL
DIFFERENTIAL TYPE: ABNORMAL
DISPENSE STATUS BLOOD BANK: NORMAL
DISPENSE STATUS BLOOD BANK: NORMAL
EKG ATRIAL RATE: 104 BPM
EKG ATRIAL RATE: 125 BPM
EKG ATRIAL RATE: 71 BPM
EKG P AXIS: 57 DEGREES
EKG P AXIS: 76 DEGREES
EKG P-R INTERVAL: 200 MS
EKG Q-T INTERVAL: 290 MS
EKG Q-T INTERVAL: 340 MS
EKG Q-T INTERVAL: 400 MS
EKG QRS DURATION: 70 MS
EKG QRS DURATION: 70 MS
EKG QRS DURATION: 76 MS
EKG QTC CALCULATION (BAZETT): 424 MS
EKG QTC CALCULATION (BAZETT): 447 MS
EKG QTC CALCULATION (BAZETT): 572 MS
EKG R AXIS: 0 DEGREES
EKG R AXIS: 32 DEGREES
EKG R AXIS: 52 DEGREES
EKG T AXIS: 56 DEGREES
EKG T AXIS: 58 DEGREES
EKG T AXIS: 81 DEGREES
EKG VENTRICULAR RATE: 104 BPM
EKG VENTRICULAR RATE: 123 BPM
EKG VENTRICULAR RATE: 129 BPM
EOSINOPHILS RELATIVE PERCENT: 0 % (ref 1–4)
EOSINOPHILS RELATIVE PERCENT: 1 % (ref 1–4)
EPITHELIAL CELLS UA: NORMAL /HPF (ref 0–5)
FERRITIN: 542 UG/L (ref 13–150)
FERRITIN: 619 UG/L (ref 13–150)
FIBRINOGEN: 558 MG/DL (ref 140–420)
FIO2: ABNORMAL
FIO2: ABNORMAL
FOLATE: 4.1 NG/ML
GFR AFRICAN AMERICAN: 21 ML/MIN
GFR AFRICAN AMERICAN: 21 ML/MIN
GFR AFRICAN AMERICAN: 26 ML/MIN
GFR AFRICAN AMERICAN: 33 ML/MIN
GFR AFRICAN AMERICAN: 43 ML/MIN
GFR AFRICAN AMERICAN: 47 ML/MIN
GFR AFRICAN AMERICAN: 55 ML/MIN
GFR AFRICAN AMERICAN: 57 ML/MIN
GFR NON-AFRICAN AMERICAN: 14 ML/MIN
GFR NON-AFRICAN AMERICAN: 17 ML/MIN
GFR NON-AFRICAN AMERICAN: 17 ML/MIN
GFR NON-AFRICAN AMERICAN: 22 ML/MIN
GFR NON-AFRICAN AMERICAN: 27 ML/MIN
GFR NON-AFRICAN AMERICAN: 35 ML/MIN
GFR NON-AFRICAN AMERICAN: 39 ML/MIN
GFR NON-AFRICAN AMERICAN: 46 ML/MIN
GFR NON-AFRICAN AMERICAN: 47 ML/MIN
GFR SERPL CREATININE-BSD FRML MDRD: 17 ML/MIN
GFR SERPL CREATININE-BSD FRML MDRD: ABNORMAL ML/MIN/{1.73_M2}
GLOBULIN: ABNORMAL G/DL (ref 1.5–3.8)
GLUCOSE BLD-MCNC: 106 MG/DL (ref 70–99)
GLUCOSE BLD-MCNC: 111 MG/DL (ref 65–105)
GLUCOSE BLD-MCNC: 111 MG/DL (ref 65–105)
GLUCOSE BLD-MCNC: 117 MG/DL (ref 70–99)
GLUCOSE BLD-MCNC: 120 MG/DL (ref 65–105)
GLUCOSE BLD-MCNC: 124 MG/DL (ref 65–105)
GLUCOSE BLD-MCNC: 128 MG/DL (ref 65–105)
GLUCOSE BLD-MCNC: 136 MG/DL (ref 65–105)
GLUCOSE BLD-MCNC: 136 MG/DL (ref 65–105)
GLUCOSE BLD-MCNC: 139 MG/DL (ref 65–105)
GLUCOSE BLD-MCNC: 150 MG/DL (ref 65–105)
GLUCOSE BLD-MCNC: 150 MG/DL (ref 70–99)
GLUCOSE BLD-MCNC: 157 MG/DL (ref 65–105)
GLUCOSE BLD-MCNC: 158 MG/DL (ref 74–100)
GLUCOSE BLD-MCNC: 202 MG/DL (ref 65–105)
GLUCOSE BLD-MCNC: 252 MG/DL (ref 70–99)
GLUCOSE BLD-MCNC: 261 MG/DL (ref 74–100)
GLUCOSE BLD-MCNC: 313 MG/DL (ref 65–105)
GLUCOSE BLD-MCNC: 337 MG/DL (ref 70–99)
GLUCOSE BLD-MCNC: 40 MG/DL (ref 70–99)
GLUCOSE BLD-MCNC: 68 MG/DL (ref 70–99)
GLUCOSE BLD-MCNC: 74 MG/DL (ref 65–105)
GLUCOSE BLD-MCNC: 81 MG/DL (ref 65–105)
GLUCOSE BLD-MCNC: 86 MG/DL (ref 65–105)
GLUCOSE BLD-MCNC: 86 MG/DL (ref 70–99)
GLUCOSE BLD-MCNC: 90 MG/DL (ref 65–105)
GLUCOSE BLD-MCNC: 95 MG/DL (ref 65–105)
GLUCOSE URINE: NEGATIVE
HCO3 VENOUS: 21 MMOL/L (ref 22–29)
HCT VFR BLD CALC: 29.2 % (ref 36.3–47.1)
HCT VFR BLD CALC: 29.5 % (ref 36.3–47.1)
HCT VFR BLD CALC: 29.5 % (ref 36.3–47.1)
HCT VFR BLD CALC: 30 % (ref 36.3–47.1)
HCT VFR BLD CALC: 31.3 % (ref 36.3–47.1)
HCT VFR BLD CALC: 31.9 % (ref 36.3–47.1)
HEMOGLOBIN: 10.2 G/DL (ref 11.9–15.1)
HEMOGLOBIN: 10.4 G/DL (ref 11.9–15.1)
HEMOGLOBIN: 9.5 G/DL (ref 11.9–15.1)
HEMOGLOBIN: 9.7 G/DL (ref 11.9–15.1)
HEMOGLOBIN: 9.8 G/DL (ref 11.9–15.1)
HEMOGLOBIN: 9.8 G/DL (ref 11.9–15.1)
IMMATURE GRANULOCYTES: 1 %
IMMATURE GRANULOCYTES: 3 %
IMMATURE GRANULOCYTES: 4 %
IMMATURE GRANULOCYTES: 5 %
INR BLD: 1
INR BLD: 1
KETONES, URINE: NEGATIVE
LACTATE DEHYDROGENASE: 249 U/L (ref 135–214)
LACTIC ACID, SEPSIS WHOLE BLOOD: 3 MMOL/L (ref 0.5–1.9)
LACTIC ACID, SEPSIS: ABNORMAL MMOL/L (ref 0.5–1.9)
LACTIC ACID, WHOLE BLOOD: 1.2 MMOL/L (ref 0.7–2.1)
LACTIC ACID, WHOLE BLOOD: 5.2 MMOL/L (ref 0.7–2.1)
LACTIC ACID: ABNORMAL MMOL/L
LEUKOCYTE ESTERASE, URINE: NEGATIVE
LYMPHOCYTES # BLD: 1 % (ref 24–44)
LYMPHOCYTES # BLD: 2 % (ref 24–43)
LYMPHOCYTES # BLD: 2 % (ref 24–44)
LYMPHOCYTES # BLD: 3 % (ref 24–43)
LYMPHOCYTES # BLD: 5 % (ref 24–43)
LYMPHOCYTES # BLD: 6 % (ref 24–44)
Lab: NORMAL
Lab: NORMAL
MCH RBC QN AUTO: 32.3 PG (ref 25.2–33.5)
MCH RBC QN AUTO: 32.5 PG (ref 25.2–33.5)
MCH RBC QN AUTO: 32.6 PG (ref 25.2–33.5)
MCH RBC QN AUTO: 32.8 PG (ref 25.2–33.5)
MCH RBC QN AUTO: 33 PG (ref 25.2–33.5)
MCH RBC QN AUTO: 33.1 PG (ref 25.2–33.5)
MCHC RBC AUTO-ENTMCNC: 32.5 G/DL (ref 28.4–34.8)
MCHC RBC AUTO-ENTMCNC: 32.6 G/DL (ref 28.4–34.8)
MCHC RBC AUTO-ENTMCNC: 32.6 G/DL (ref 28.4–34.8)
MCHC RBC AUTO-ENTMCNC: 32.7 G/DL (ref 28.4–34.8)
MCHC RBC AUTO-ENTMCNC: 32.9 G/DL (ref 28.4–34.8)
MCHC RBC AUTO-ENTMCNC: 33.2 G/DL (ref 28.4–34.8)
MCV RBC AUTO: 100 FL (ref 82.6–102.9)
MCV RBC AUTO: 100.7 FL (ref 82.6–102.9)
MCV RBC AUTO: 101.6 FL (ref 82.6–102.9)
MCV RBC AUTO: 98.3 FL (ref 82.6–102.9)
MCV RBC AUTO: 99.3 FL (ref 82.6–102.9)
MCV RBC AUTO: 99.3 FL (ref 82.6–102.9)
MODE: ABNORMAL
MODE: ABNORMAL
MONOCYTES # BLD: 3 % (ref 1–7)
MONOCYTES # BLD: 4 % (ref 1–7)
MONOCYTES # BLD: 5 % (ref 3–12)
MONOCYTES # BLD: 6 % (ref 1–7)
MONOCYTES # BLD: 6 % (ref 3–12)
MONOCYTES # BLD: 7 % (ref 3–12)
MORPHOLOGY: ABNORMAL
MORPHOLOGY: NORMAL
MUCUS: NORMAL
NEGATIVE BASE EXCESS, ART: 4 (ref 0–2)
NEGATIVE BASE EXCESS, VEN: 5 (ref 0–2)
NITRITE, URINE: NEGATIVE
NOTIFY: NORMAL
NRBC AUTOMATED: 0 PER 100 WBC
O2 DEVICE/FLOW/%: ABNORMAL
O2 DEVICE/FLOW/%: ABNORMAL
O2 SAT, VEN: 78 % (ref 60–85)
OTHER OBSERVATIONS UA: NORMAL
PARTIAL THROMBOPLASTIN TIME: 21.2 SEC (ref 20.5–30.5)
PARTIAL THROMBOPLASTIN TIME: 23.2 SEC (ref 20.5–30.5)
PATIENT TEMP: ABNORMAL
PATIENT TEMP: ABNORMAL
PCO2, VEN: 42.1 MM HG (ref 41–51)
PDW BLD-RTO: 13.4 % (ref 11.8–14.4)
PDW BLD-RTO: 13.7 % (ref 11.8–14.4)
PDW BLD-RTO: 14 % (ref 11.8–14.4)
PDW BLD-RTO: 14.1 % (ref 11.8–14.4)
PDW BLD-RTO: 14.2 % (ref 11.8–14.4)
PDW BLD-RTO: 14.4 % (ref 11.8–14.4)
PH UA: 6 (ref 5–8)
PH VENOUS: 7.3 (ref 7.32–7.43)
PLATELET # BLD: 116 K/UL (ref 138–453)
PLATELET # BLD: 130 K/UL (ref 138–453)
PLATELET # BLD: 161 K/UL (ref 138–453)
PLATELET # BLD: 174 K/UL (ref 138–453)
PLATELET # BLD: ABNORMAL K/UL (ref 138–453)
PLATELET # BLD: ABNORMAL K/UL (ref 138–453)
PLATELET ESTIMATE: ABNORMAL
PLATELET, FLUORESCENCE: 122 K/UL (ref 138–453)
PLATELET, FLUORESCENCE: NORMAL K/UL (ref 138–453)
PLATELET, IMMATURE FRACTION: 5.4 % (ref 1.1–10.3)
PMV BLD AUTO: 11.2 FL (ref 8.1–13.5)
PMV BLD AUTO: 11.3 FL (ref 8.1–13.5)
PMV BLD AUTO: 11.9 FL (ref 8.1–13.5)
PMV BLD AUTO: 12.4 FL (ref 8.1–13.5)
PMV BLD AUTO: ABNORMAL FL (ref 8.1–13.5)
PMV BLD AUTO: ABNORMAL FL (ref 8.1–13.5)
PO2, VEN: 46.3 MM HG (ref 30–50)
POC CHLORIDE: 102 MMOL/L (ref 98–107)
POC CREATININE: 3.02 MG/DL (ref 0.51–1.19)
POC HCO3: 20.5 MMOL/L (ref 21–28)
POC HEMATOCRIT: 28 % (ref 36–46)
POC HEMOGLOBIN: 9.5 G/DL (ref 12–16)
POC IONIZED CALCIUM: 1.21 MMOL/L (ref 1.15–1.33)
POC LACTIC ACID: 1.73 MMOL/L (ref 0.56–1.39)
POC LACTIC ACID: 4.68 MMOL/L (ref 0.56–1.39)
POC O2 SATURATION: 84 % (ref 94–98)
POC PCO2 TEMP: ABNORMAL MM HG
POC PCO2 TEMP: ABNORMAL MM HG
POC PCO2: 32.4 MM HG (ref 35–48)
POC PH TEMP: ABNORMAL
POC PH TEMP: ABNORMAL
POC PH: 7.41 (ref 7.35–7.45)
POC PO2 TEMP: ABNORMAL MM HG
POC PO2 TEMP: ABNORMAL MM HG
POC PO2: 47.6 MM HG (ref 83–108)
POC POTASSIUM: 4.7 MMOL/L (ref 3.5–4.5)
POC SODIUM: 135 MMOL/L (ref 138–146)
POSITIVE BASE EXCESS, ART: ABNORMAL (ref 0–3)
POSITIVE BASE EXCESS, VEN: ABNORMAL (ref 0–3)
POTASSIUM SERPL-SCNC: 3.9 MMOL/L (ref 3.7–5.3)
POTASSIUM SERPL-SCNC: 4.1 MMOL/L (ref 3.7–5.3)
POTASSIUM SERPL-SCNC: 4.2 MMOL/L (ref 3.7–5.3)
POTASSIUM SERPL-SCNC: 4.3 MMOL/L (ref 3.7–5.3)
POTASSIUM SERPL-SCNC: 4.9 MMOL/L (ref 3.7–5.3)
POTASSIUM SERPL-SCNC: 4.9 MMOL/L (ref 3.7–5.3)
PRO-BNP: 1442 PG/ML
PRO-BNP: 2521 PG/ML
PRO-BNP: 8944 PG/ML
PROTEIN UA: NEGATIVE
PROTHROMBIN TIME: 10.7 SEC (ref 9–12)
PROTHROMBIN TIME: 10.7 SEC (ref 9–12)
RBC # BLD: 2.9 M/UL (ref 3.95–5.11)
RBC # BLD: 2.97 M/UL (ref 3.95–5.11)
RBC # BLD: 3 M/UL (ref 3.95–5.11)
RBC # BLD: 3.02 M/UL (ref 3.95–5.11)
RBC # BLD: 3.08 M/UL (ref 3.95–5.11)
RBC # BLD: 3.19 M/UL (ref 3.95–5.11)
RBC # BLD: ABNORMAL 10*6/UL
RBC UA: NORMAL /HPF (ref 0–4)
READ BACK: YES
RENAL EPITHELIAL, UA: NORMAL /HPF
SAMPLE SITE: ABNORMAL
SAMPLE SITE: ABNORMAL
SARS-COV-2, PCR: ABNORMAL
SARS-COV-2, PCR: ABNORMAL
SARS-COV-2, RAPID: ABNORMAL
SARS-COV-2, RAPID: DETECTED
SARS-COV-2: ABNORMAL
SARS-COV-2: DETECTED
SEG NEUTROPHILS: 85 % (ref 36–65)
SEG NEUTROPHILS: 87 % (ref 36–65)
SEG NEUTROPHILS: 87 % (ref 36–65)
SEG NEUTROPHILS: 87 % (ref 36–66)
SEG NEUTROPHILS: 91 % (ref 36–66)
SEG NEUTROPHILS: 92 % (ref 36–66)
SEGMENTED NEUTROPHILS ABSOLUTE COUNT: 14.34 K/UL (ref 1.5–8.1)
SEGMENTED NEUTROPHILS ABSOLUTE COUNT: 6.54 K/UL (ref 1.5–8.1)
SEGMENTED NEUTROPHILS ABSOLUTE COUNT: 8.82 K/UL (ref 1.8–7.7)
SEGMENTED NEUTROPHILS ABSOLUTE COUNT: 8.91 K/UL (ref 1.8–7.7)
SEGMENTED NEUTROPHILS ABSOLUTE COUNT: 9.27 K/UL (ref 1.5–8.1)
SEGMENTED NEUTROPHILS ABSOLUTE COUNT: 9.31 K/UL (ref 1.8–7.7)
SODIUM BLD-SCNC: 133 MMOL/L (ref 135–144)
SODIUM BLD-SCNC: 134 MMOL/L (ref 135–144)
SODIUM BLD-SCNC: 138 MMOL/L (ref 135–144)
SODIUM BLD-SCNC: 140 MMOL/L (ref 135–144)
SODIUM BLD-SCNC: 140 MMOL/L (ref 135–144)
SODIUM BLD-SCNC: 142 MMOL/L (ref 135–144)
SODIUM BLD-SCNC: 142 MMOL/L (ref 135–144)
SODIUM BLD-SCNC: 143 MMOL/L (ref 135–144)
SOURCE: ABNORMAL
SOURCE: ABNORMAL
SPECIFIC GRAVITY UA: 1.01 (ref 1–1.03)
SPECIMEN DESCRIPTION: NORMAL
SPECIMEN DESCRIPTION: NORMAL
TCO2 (CALC), ART: 22 MMOL/L (ref 22–29)
TOTAL CK: 194 U/L (ref 26–192)
TOTAL CO2, VENOUS: 22 MMOL/L (ref 23–30)
TOTAL PROTEIN: 4.4 G/DL (ref 6.4–8.3)
TOTAL PROTEIN: 4.5 G/DL (ref 6.4–8.3)
TOTAL PROTEIN: 4.6 G/DL (ref 6.4–8.3)
TOTAL PROTEIN: 4.7 G/DL (ref 6.4–8.3)
TOTAL PROTEIN: 4.9 G/DL (ref 6.4–8.3)
TOTAL PROTEIN: 5.3 G/DL (ref 6.4–8.3)
TRANSFUSION STATUS: NORMAL
TRANSFUSION STATUS: NORMAL
TRICHOMONAS: NORMAL
TROPONIN INTERP: ABNORMAL
TROPONIN T: ABNORMAL NG/ML
TROPONIN, HIGH SENSITIVITY: 69 NG/L (ref 0–14)
TROPONIN, HIGH SENSITIVITY: 70 NG/L (ref 0–14)
TROPONIN, HIGH SENSITIVITY: 81 NG/L (ref 0–14)
TROPONIN, HIGH SENSITIVITY: 82 NG/L (ref 0–14)
TSH SERPL DL<=0.05 MIU/L-ACNC: 0.96 MIU/L (ref 0.3–5)
TURBIDITY: CLEAR
UNIT DIVISION: 0
UNIT DIVISION: 0
UNIT NUMBER: NORMAL
UNIT NUMBER: NORMAL
URINE HGB: ABNORMAL
UROBILINOGEN, URINE: NORMAL
VITAMIN B-12: 947 PG/ML (ref 232–1245)
WBC # BLD: 10.7 K/UL (ref 3.5–11.3)
WBC # BLD: 10.7 K/UL (ref 3.5–11.3)
WBC # BLD: 16.5 K/UL (ref 3.5–11.3)
WBC # BLD: 7.7 K/UL (ref 3.5–11.3)
WBC # BLD: 9.6 K/UL (ref 3.5–11.3)
WBC # BLD: 9.8 K/UL (ref 3.5–11.3)
WBC # BLD: ABNORMAL 10*3/UL
WBC UA: NORMAL /HPF (ref 0–5)
YEAST: NORMAL

## 2020-01-01 PROCEDURE — 83880 ASSAY OF NATRIURETIC PEPTIDE: CPT

## 2020-01-01 PROCEDURE — 6360000002 HC RX W HCPCS: Performed by: CLINICAL NURSE SPECIALIST

## 2020-01-01 PROCEDURE — 2580000003 HC RX 258: Performed by: INTERNAL MEDICINE

## 2020-01-01 PROCEDURE — 2580000003 HC RX 258: Performed by: CLINICAL NURSE SPECIALIST

## 2020-01-01 PROCEDURE — 80053 COMPREHEN METABOLIC PANEL: CPT

## 2020-01-01 PROCEDURE — 81001 URINALYSIS AUTO W/SCOPE: CPT

## 2020-01-01 PROCEDURE — 82947 ASSAY GLUCOSE BLOOD QUANT: CPT

## 2020-01-01 PROCEDURE — U0003 INFECTIOUS AGENT DETECTION BY NUCLEIC ACID (DNA OR RNA); SEVERE ACUTE RESPIRATORY SYNDROME CORONAVIRUS 2 (SARS-COV-2) (CORONAVIRUS DISEASE [COVID-19]), AMPLIFIED PROBE TECHNIQUE, MAKING USE OF HIGH THROUGHPUT TECHNOLOGIES AS DESCRIBED BY CMS-2020-01-R: HCPCS

## 2020-01-01 PROCEDURE — 6370000000 HC RX 637 (ALT 250 FOR IP): Performed by: INTERNAL MEDICINE

## 2020-01-01 PROCEDURE — 97530 THERAPEUTIC ACTIVITIES: CPT

## 2020-01-01 PROCEDURE — 2500000003 HC RX 250 WO HCPCS: Performed by: INTERNAL MEDICINE

## 2020-01-01 PROCEDURE — 97116 GAIT TRAINING THERAPY: CPT

## 2020-01-01 PROCEDURE — 2500000003 HC RX 250 WO HCPCS: Performed by: CLINICAL NURSE SPECIALIST

## 2020-01-01 PROCEDURE — 6370000000 HC RX 637 (ALT 250 FOR IP): Performed by: CLINICAL NURSE SPECIALIST

## 2020-01-01 PROCEDURE — 6360000002 HC RX W HCPCS: Performed by: INTERNAL MEDICINE

## 2020-01-01 PROCEDURE — 80048 BASIC METABOLIC PNL TOTAL CA: CPT

## 2020-01-01 PROCEDURE — 85384 FIBRINOGEN ACTIVITY: CPT

## 2020-01-01 PROCEDURE — 86140 C-REACTIVE PROTEIN: CPT

## 2020-01-01 PROCEDURE — 99233 SBSQ HOSP IP/OBS HIGH 50: CPT | Performed by: INTERNAL MEDICINE

## 2020-01-01 PROCEDURE — 94660 CPAP INITIATION&MGMT: CPT

## 2020-01-01 PROCEDURE — 71045 X-RAY EXAM CHEST 1 VIEW: CPT

## 2020-01-01 PROCEDURE — 84484 ASSAY OF TROPONIN QUANT: CPT

## 2020-01-01 PROCEDURE — 97110 THERAPEUTIC EXERCISES: CPT

## 2020-01-01 PROCEDURE — 2580000003 HC RX 258: Performed by: STUDENT IN AN ORGANIZED HEALTH CARE EDUCATION/TRAINING PROGRAM

## 2020-01-01 PROCEDURE — 85055 RETICULATED PLATELET ASSAY: CPT

## 2020-01-01 PROCEDURE — 94761 N-INVAS EAR/PLS OXIMETRY MLT: CPT

## 2020-01-01 PROCEDURE — 99232 SBSQ HOSP IP/OBS MODERATE 35: CPT | Performed by: INTERNAL MEDICINE

## 2020-01-01 PROCEDURE — 2700000000 HC OXYGEN THERAPY PER DAY

## 2020-01-01 PROCEDURE — 82550 ASSAY OF CK (CPK): CPT

## 2020-01-01 PROCEDURE — 82746 ASSAY OF FOLIC ACID SERUM: CPT

## 2020-01-01 PROCEDURE — 85025 COMPLETE CBC W/AUTO DIFF WBC: CPT

## 2020-01-01 PROCEDURE — 85610 PROTHROMBIN TIME: CPT

## 2020-01-01 PROCEDURE — 82803 BLOOD GASES ANY COMBINATION: CPT

## 2020-01-01 PROCEDURE — 84295 ASSAY OF SERUM SODIUM: CPT

## 2020-01-01 PROCEDURE — 85379 FIBRIN DEGRADATION QUANT: CPT

## 2020-01-01 PROCEDURE — 99238 HOSP IP/OBS DSCHRG MGMT 30/<: CPT | Performed by: INTERNAL MEDICINE

## 2020-01-01 PROCEDURE — 2060000000 HC ICU INTERMEDIATE R&B

## 2020-01-01 PROCEDURE — 84443 ASSAY THYROID STIM HORMONE: CPT

## 2020-01-01 PROCEDURE — 82435 ASSAY OF BLOOD CHLORIDE: CPT

## 2020-01-01 PROCEDURE — 82607 VITAMIN B-12: CPT

## 2020-01-01 PROCEDURE — 72131 CT LUMBAR SPINE W/O DYE: CPT

## 2020-01-01 PROCEDURE — 83615 LACTATE (LD) (LDH) ENZYME: CPT

## 2020-01-01 PROCEDURE — 82728 ASSAY OF FERRITIN: CPT

## 2020-01-01 PROCEDURE — 06HY33Z INSERTION OF INFUSION DEVICE INTO LOWER VEIN, PERCUTANEOUS APPROACH: ICD-10-PCS | Performed by: EMERGENCY MEDICINE

## 2020-01-01 PROCEDURE — U0002 COVID-19 LAB TEST NON-CDC: HCPCS

## 2020-01-01 PROCEDURE — 70450 CT HEAD/BRAIN W/O DYE: CPT

## 2020-01-01 PROCEDURE — 83605 ASSAY OF LACTIC ACID: CPT

## 2020-01-01 PROCEDURE — 99239 HOSP IP/OBS DSCHRG MGMT >30: CPT | Performed by: INTERNAL MEDICINE

## 2020-01-01 PROCEDURE — 86901 BLOOD TYPING SEROLOGIC RH(D): CPT

## 2020-01-01 PROCEDURE — 97166 OT EVAL MOD COMPLEX 45 MIN: CPT

## 2020-01-01 PROCEDURE — 83036 HEMOGLOBIN GLYCOSYLATED A1C: CPT

## 2020-01-01 PROCEDURE — 72128 CT CHEST SPINE W/O DYE: CPT

## 2020-01-01 PROCEDURE — 99221 1ST HOSP IP/OBS SF/LOW 40: CPT | Performed by: INTERNAL MEDICINE

## 2020-01-01 PROCEDURE — 86900 BLOOD TYPING SEROLOGIC ABO: CPT

## 2020-01-01 PROCEDURE — 73562 X-RAY EXAM OF KNEE 3: CPT

## 2020-01-01 PROCEDURE — 84132 ASSAY OF SERUM POTASSIUM: CPT

## 2020-01-01 PROCEDURE — 93005 ELECTROCARDIOGRAM TRACING: CPT | Performed by: INTERNAL MEDICINE

## 2020-01-01 PROCEDURE — 51798 US URINE CAPACITY MEASURE: CPT

## 2020-01-01 PROCEDURE — 97535 SELF CARE MNGMENT TRAINING: CPT

## 2020-01-01 PROCEDURE — 85730 THROMBOPLASTIN TIME PARTIAL: CPT

## 2020-01-01 PROCEDURE — 2000000000 HC ICU R&B

## 2020-01-01 PROCEDURE — 36556 INSERT NON-TUNNEL CV CATH: CPT

## 2020-01-01 PROCEDURE — 72125 CT NECK SPINE W/O DYE: CPT

## 2020-01-01 PROCEDURE — 99222 1ST HOSP IP/OBS MODERATE 55: CPT | Performed by: CLINICAL NURSE SPECIALIST

## 2020-01-01 PROCEDURE — 93005 ELECTROCARDIOGRAM TRACING: CPT | Performed by: STUDENT IN AN ORGANIZED HEALTH CARE EDUCATION/TRAINING PROGRAM

## 2020-01-01 PROCEDURE — 96365 THER/PROPH/DIAG IV INF INIT: CPT

## 2020-01-01 PROCEDURE — 36600 WITHDRAWAL OF ARTERIAL BLOOD: CPT

## 2020-01-01 PROCEDURE — 6360000002 HC RX W HCPCS: Performed by: STUDENT IN AN ORGANIZED HEALTH CARE EDUCATION/TRAINING PROGRAM

## 2020-01-01 PROCEDURE — 97162 PT EVAL MOD COMPLEX 30 MIN: CPT

## 2020-01-01 PROCEDURE — 85014 HEMATOCRIT: CPT

## 2020-01-01 PROCEDURE — 87040 BLOOD CULTURE FOR BACTERIA: CPT

## 2020-01-01 PROCEDURE — 76937 US GUIDE VASCULAR ACCESS: CPT

## 2020-01-01 PROCEDURE — 82565 ASSAY OF CREATININE: CPT

## 2020-01-01 PROCEDURE — 82533 TOTAL CORTISOL: CPT

## 2020-01-01 PROCEDURE — 1250000000 HC SEMI PRIVATE HOSPICE R&B

## 2020-01-01 PROCEDURE — 74176 CT ABD & PELVIS W/O CONTRAST: CPT

## 2020-01-01 PROCEDURE — 82330 ASSAY OF CALCIUM: CPT

## 2020-01-01 PROCEDURE — 99285 EMERGENCY DEPT VISIT HI MDM: CPT

## 2020-01-01 PROCEDURE — 99222 1ST HOSP IP/OBS MODERATE 55: CPT | Performed by: INTERNAL MEDICINE

## 2020-01-01 PROCEDURE — 93010 ELECTROCARDIOGRAM REPORT: CPT | Performed by: INTERNAL MEDICINE

## 2020-01-01 PROCEDURE — 51702 INSERT TEMP BLADDER CATH: CPT

## 2020-01-01 PROCEDURE — 80076 HEPATIC FUNCTION PANEL: CPT

## 2020-01-01 PROCEDURE — 2580000003 HC RX 258

## 2020-01-01 PROCEDURE — 99497 ADVNCD CARE PLAN 30 MIN: CPT | Performed by: INTERNAL MEDICINE

## 2020-01-01 RX ORDER — SODIUM CHLORIDE 0.9 % (FLUSH) 0.9 %
10 SYRINGE (ML) INJECTION PRN
Status: DISCONTINUED | OUTPATIENT
Start: 2020-01-01 | End: 2020-01-01 | Stop reason: HOSPADM

## 2020-01-01 RX ORDER — CIPROFLOXACIN 250 MG/1
250 TABLET, FILM COATED ORAL EVERY 12 HOURS SCHEDULED
Status: DISCONTINUED | OUTPATIENT
Start: 2020-01-01 | End: 2020-01-01

## 2020-01-01 RX ORDER — LORAZEPAM 2 MG/ML
1 INJECTION INTRAMUSCULAR
Status: DISCONTINUED | OUTPATIENT
Start: 2020-01-01 | End: 2020-01-01 | Stop reason: HOSPADM

## 2020-01-01 RX ORDER — ONDANSETRON 2 MG/ML
4 INJECTION INTRAMUSCULAR; INTRAVENOUS EVERY 6 HOURS PRN
Status: CANCELLED | OUTPATIENT
Start: 2020-01-01

## 2020-01-01 RX ORDER — GLYCOPYRROLATE 0.2 MG/ML
0.2 INJECTION INTRAMUSCULAR; INTRAVENOUS EVERY 4 HOURS PRN
Status: DISCONTINUED | OUTPATIENT
Start: 2020-01-01 | End: 2020-01-01 | Stop reason: HOSPADM

## 2020-01-01 RX ORDER — POLYETHYLENE GLYCOL 3350 17 G/17G
17 POWDER, FOR SOLUTION ORAL DAILY PRN
Status: CANCELLED | OUTPATIENT
Start: 2020-01-01

## 2020-01-01 RX ORDER — ACETAMINOPHEN 325 MG/1
650 TABLET ORAL EVERY 6 HOURS PRN
Status: DISCONTINUED | OUTPATIENT
Start: 2020-01-01 | End: 2020-01-01 | Stop reason: HOSPADM

## 2020-01-01 RX ORDER — METRONIDAZOLE 500 MG/1
500 TABLET ORAL EVERY 8 HOURS SCHEDULED
Status: DISCONTINUED | OUTPATIENT
Start: 2020-01-01 | End: 2020-01-01

## 2020-01-01 RX ORDER — MORPHINE SULFATE 2 MG/ML
2 INJECTION, SOLUTION INTRAMUSCULAR; INTRAVENOUS
Status: CANCELLED | OUTPATIENT
Start: 2020-01-01

## 2020-01-01 RX ORDER — IPRATROPIUM BROMIDE AND ALBUTEROL SULFATE 2.5; .5 MG/3ML; MG/3ML
1 SOLUTION RESPIRATORY (INHALATION) EVERY 6 HOURS PRN
COMMUNITY
End: 2020-01-01 | Stop reason: HOSPADM

## 2020-01-01 RX ORDER — POTASSIUM CHLORIDE 7.45 MG/ML
10 INJECTION INTRAVENOUS PRN
Status: DISCONTINUED | OUTPATIENT
Start: 2020-01-01 | End: 2020-01-01

## 2020-01-01 RX ORDER — SODIUM CHLORIDE 9 MG/ML
INJECTION, SOLUTION INTRAVENOUS CONTINUOUS
Status: DISCONTINUED | OUTPATIENT
Start: 2020-01-01 | End: 2020-01-01

## 2020-01-01 RX ORDER — POLYETHYLENE GLYCOL 3350 17 G/17G
17 POWDER, FOR SOLUTION ORAL DAILY PRN
COMMUNITY
End: 2020-01-01 | Stop reason: HOSPADM

## 2020-01-01 RX ORDER — POLYETHYLENE GLYCOL 3350 17 G/17G
17 POWDER, FOR SOLUTION ORAL DAILY PRN
Status: DISCONTINUED | OUTPATIENT
Start: 2020-01-01 | End: 2020-01-01 | Stop reason: HOSPADM

## 2020-01-01 RX ORDER — CIPROFLOXACIN 2 MG/ML
400 INJECTION, SOLUTION INTRAVENOUS EVERY 24 HOURS
Status: DISCONTINUED | OUTPATIENT
Start: 2020-01-01 | End: 2020-01-01

## 2020-01-01 RX ORDER — LORAZEPAM 2 MG/ML
1 INJECTION INTRAMUSCULAR
Status: CANCELLED | OUTPATIENT
Start: 2020-01-01

## 2020-01-01 RX ORDER — PROMETHAZINE HYDROCHLORIDE 25 MG/1
12.5 TABLET ORAL EVERY 6 HOURS PRN
Status: CANCELLED | OUTPATIENT
Start: 2020-01-01

## 2020-01-01 RX ORDER — SODIUM CHLORIDE 0.9 % (FLUSH) 0.9 %
10 SYRINGE (ML) INJECTION EVERY 12 HOURS SCHEDULED
Status: DISCONTINUED | OUTPATIENT
Start: 2020-01-01 | End: 2020-01-01 | Stop reason: HOSPADM

## 2020-01-01 RX ORDER — MORPHINE SULFATE 2 MG/ML
2 INJECTION, SOLUTION INTRAMUSCULAR; INTRAVENOUS
Status: DISCONTINUED | OUTPATIENT
Start: 2020-01-01 | End: 2020-01-01 | Stop reason: HOSPADM

## 2020-01-01 RX ORDER — SODIUM CHLORIDE 0.9 % (FLUSH) 0.9 %
10 SYRINGE (ML) INJECTION PRN
Status: CANCELLED | OUTPATIENT
Start: 2020-01-01

## 2020-01-01 RX ORDER — VANCOMYCIN HYDROCHLORIDE 1 G/200ML
15 INJECTION, SOLUTION INTRAVENOUS ONCE
Status: COMPLETED | OUTPATIENT
Start: 2020-01-01 | End: 2020-01-01

## 2020-01-01 RX ORDER — DEXTROSE MONOHYDRATE 25 G/50ML
12.5 INJECTION, SOLUTION INTRAVENOUS PRN
Status: CANCELLED | OUTPATIENT
Start: 2020-01-01

## 2020-01-01 RX ORDER — ACETAMINOPHEN 325 MG/1
650 TABLET ORAL EVERY 6 HOURS PRN
Status: CANCELLED | OUTPATIENT
Start: 2020-01-01

## 2020-01-01 RX ORDER — ACETAMINOPHEN 650 MG/1
650 SUPPOSITORY RECTAL EVERY 6 HOURS PRN
Status: DISCONTINUED | OUTPATIENT
Start: 2020-01-01 | End: 2020-01-01 | Stop reason: HOSPADM

## 2020-01-01 RX ORDER — ONDANSETRON 2 MG/ML
4 INJECTION INTRAMUSCULAR; INTRAVENOUS EVERY 6 HOURS PRN
Status: DISCONTINUED | OUTPATIENT
Start: 2020-01-01 | End: 2020-01-01 | Stop reason: HOSPADM

## 2020-01-01 RX ORDER — MAGNESIUM SULFATE 1 G/100ML
1 INJECTION INTRAVENOUS PRN
Status: DISCONTINUED | OUTPATIENT
Start: 2020-01-01 | End: 2020-01-01

## 2020-01-01 RX ORDER — PROMETHAZINE HYDROCHLORIDE 25 MG/1
12.5 TABLET ORAL EVERY 6 HOURS PRN
Status: DISCONTINUED | OUTPATIENT
Start: 2020-01-01 | End: 2020-01-01 | Stop reason: HOSPADM

## 2020-01-01 RX ORDER — ACETAMINOPHEN 325 MG/1
650 TABLET ORAL EVERY 6 HOURS PRN
Status: DISCONTINUED | OUTPATIENT
Start: 2020-01-01 | End: 2020-01-01 | Stop reason: SDUPTHER

## 2020-01-01 RX ORDER — DIPHENHYDRAMINE HYDROCHLORIDE 50 MG/ML
12.5 INJECTION INTRAMUSCULAR; INTRAVENOUS EVERY 6 HOURS PRN
Status: CANCELLED | OUTPATIENT
Start: 2020-01-01

## 2020-01-01 RX ORDER — DEXTROSE MONOHYDRATE 25 G/50ML
12.5 INJECTION, SOLUTION INTRAVENOUS PRN
Status: DISCONTINUED | OUTPATIENT
Start: 2020-01-01 | End: 2020-01-01 | Stop reason: HOSPADM

## 2020-01-01 RX ORDER — DOCUSATE SODIUM 100 MG/1
100 CAPSULE, LIQUID FILLED ORAL 2 TIMES DAILY
Status: DISCONTINUED | OUTPATIENT
Start: 2020-01-01 | End: 2020-01-01 | Stop reason: HOSPADM

## 2020-01-01 RX ORDER — NOREPINEPHRINE BIT/0.9 % NACL 16MG/250ML
2 INFUSION BOTTLE (ML) INTRAVENOUS CONTINUOUS
Status: DISCONTINUED | OUTPATIENT
Start: 2020-01-01 | End: 2020-01-01

## 2020-01-01 RX ORDER — HEPARIN SODIUM 5000 [USP'U]/ML
5000 INJECTION, SOLUTION INTRAVENOUS; SUBCUTANEOUS EVERY 8 HOURS SCHEDULED
Status: DISCONTINUED | OUTPATIENT
Start: 2020-01-01 | End: 2020-01-01 | Stop reason: HOSPADM

## 2020-01-01 RX ORDER — DEXTROSE MONOHYDRATE 25 G/50ML
INJECTION, SOLUTION INTRAVENOUS
Status: COMPLETED
Start: 2020-01-01 | End: 2020-01-01

## 2020-01-01 RX ORDER — ALBUTEROL SULFATE 2.5 MG/3ML
2.5 SOLUTION RESPIRATORY (INHALATION) EVERY 4 HOURS PRN
COMMUNITY
End: 2020-01-01 | Stop reason: HOSPADM

## 2020-01-01 RX ORDER — DOCUSATE SODIUM 100 MG/1
100 CAPSULE, LIQUID FILLED ORAL 2 TIMES DAILY
Status: ON HOLD | COMMUNITY
End: 2020-01-01 | Stop reason: HOSPADM

## 2020-01-01 RX ORDER — ACETAMINOPHEN 325 MG/1
650 TABLET ORAL EVERY 6 HOURS PRN
COMMUNITY
End: 2020-01-01 | Stop reason: HOSPADM

## 2020-01-01 RX ORDER — WHEAT DEXTRIN 3 G/3.8 G
POWDER (GRAM) ORAL DAILY
Status: ON HOLD | COMMUNITY
End: 2020-01-01 | Stop reason: HOSPADM

## 2020-01-01 RX ORDER — DEXTROSE MONOHYDRATE 50 MG/ML
100 INJECTION, SOLUTION INTRAVENOUS PRN
Status: DISCONTINUED | OUTPATIENT
Start: 2020-01-01 | End: 2020-01-01 | Stop reason: HOSPADM

## 2020-01-01 RX ORDER — GLYCOPYRROLATE 0.2 MG/ML
0.2 INJECTION INTRAMUSCULAR; INTRAVENOUS EVERY 4 HOURS PRN
Status: CANCELLED | OUTPATIENT
Start: 2020-01-01

## 2020-01-01 RX ORDER — MORPHINE SULFATE 4 MG/ML
3 INJECTION, SOLUTION INTRAMUSCULAR; INTRAVENOUS EVERY 4 HOURS
Status: DISCONTINUED | OUTPATIENT
Start: 2020-01-01 | End: 2020-01-01 | Stop reason: HOSPADM

## 2020-01-01 RX ORDER — 0.9 % SODIUM CHLORIDE 0.9 %
1000 INTRAVENOUS SOLUTION INTRAVENOUS ONCE
Status: COMPLETED | OUTPATIENT
Start: 2020-01-01 | End: 2020-01-01

## 2020-01-01 RX ORDER — FERROUS SULFATE 325(65) MG
325 TABLET ORAL 2 TIMES DAILY
Status: ON HOLD | COMMUNITY
End: 2020-01-01 | Stop reason: HOSPADM

## 2020-01-01 RX ORDER — LANOLIN ALCOHOL/MO/W.PET/CERES
325 CREAM (GRAM) TOPICAL 2 TIMES DAILY
Status: DISCONTINUED | OUTPATIENT
Start: 2020-01-01 | End: 2020-01-01 | Stop reason: HOSPADM

## 2020-01-01 RX ORDER — ATORVASTATIN CALCIUM 20 MG/1
20 TABLET, FILM COATED ORAL DAILY
COMMUNITY
End: 2020-01-01 | Stop reason: HOSPADM

## 2020-01-01 RX ORDER — ATORVASTATIN CALCIUM 20 MG/1
20 TABLET, FILM COATED ORAL DAILY
Status: DISCONTINUED | OUTPATIENT
Start: 2020-01-01 | End: 2020-01-01 | Stop reason: HOSPADM

## 2020-01-01 RX ORDER — ACETAMINOPHEN 650 MG/1
650 SUPPOSITORY RECTAL EVERY 6 HOURS PRN
Status: CANCELLED | OUTPATIENT
Start: 2020-01-01

## 2020-01-01 RX ORDER — LORAZEPAM 2 MG/ML
1 INJECTION INTRAMUSCULAR EVERY 4 HOURS
Status: DISCONTINUED | OUTPATIENT
Start: 2020-01-01 | End: 2020-01-01 | Stop reason: HOSPADM

## 2020-01-01 RX ORDER — FOLIC ACID 1 MG/1
1 TABLET ORAL DAILY
Status: DISCONTINUED | OUTPATIENT
Start: 2020-01-01 | End: 2020-01-01 | Stop reason: HOSPADM

## 2020-01-01 RX ORDER — DIPHENHYDRAMINE HYDROCHLORIDE 50 MG/ML
12.5 INJECTION INTRAMUSCULAR; INTRAVENOUS EVERY 6 HOURS PRN
Status: DISCONTINUED | OUTPATIENT
Start: 2020-01-01 | End: 2020-01-01 | Stop reason: HOSPADM

## 2020-01-01 RX ORDER — 0.9 % SODIUM CHLORIDE 0.9 %
20 INTRAVENOUS SOLUTION INTRAVENOUS ONCE
Status: COMPLETED | OUTPATIENT
Start: 2020-01-01 | End: 2020-01-01

## 2020-01-01 RX ORDER — BUMETANIDE 1 MG/1
1 TABLET ORAL DAILY
COMMUNITY
End: 2020-01-01 | Stop reason: HOSPADM

## 2020-01-01 RX ORDER — CETIRIZINE HYDROCHLORIDE 10 MG/1
5 TABLET ORAL DAILY
Status: ON HOLD | COMMUNITY
End: 2020-01-01 | Stop reason: HOSPADM

## 2020-01-01 RX ORDER — GLIMEPIRIDE 1 MG/1
1 TABLET ORAL
Status: ON HOLD | COMMUNITY
End: 2020-01-01 | Stop reason: HOSPADM

## 2020-01-01 RX ORDER — UREA 10 %
1 LOTION (ML) TOPICAL DAILY
Status: ON HOLD | COMMUNITY
End: 2020-01-01 | Stop reason: HOSPADM

## 2020-01-01 RX ORDER — POTASSIUM CHLORIDE 20 MEQ/1
40 TABLET, EXTENDED RELEASE ORAL PRN
Status: DISCONTINUED | OUTPATIENT
Start: 2020-01-01 | End: 2020-01-01

## 2020-01-01 RX ORDER — SODIUM CHLORIDE 0.9 % (FLUSH) 0.9 %
10 SYRINGE (ML) INJECTION EVERY 12 HOURS SCHEDULED
Status: CANCELLED | OUTPATIENT
Start: 2020-01-01

## 2020-01-01 RX ORDER — 0.9 % SODIUM CHLORIDE 0.9 %
500 INTRAVENOUS SOLUTION INTRAVENOUS ONCE
Status: COMPLETED | OUTPATIENT
Start: 2020-01-01 | End: 2020-01-01

## 2020-01-01 RX ADMIN — FOLIC ACID 1 MG: 1 TABLET ORAL at 09:56

## 2020-01-01 RX ADMIN — HEPARIN SODIUM 5000 UNITS: 5000 INJECTION INTRAVENOUS; SUBCUTANEOUS at 13:51

## 2020-01-01 RX ADMIN — LORAZEPAM 1 MG: 2 INJECTION INTRAMUSCULAR; INTRAVENOUS at 18:13

## 2020-01-01 RX ADMIN — ANTI-FUNGAL POWDER MICONAZOLE NITRATE TALC FREE: 1.42 POWDER TOPICAL at 21:37

## 2020-01-01 RX ADMIN — CIPROFLOXACIN 400 MG: 2 INJECTION, SOLUTION INTRAVENOUS at 21:50

## 2020-01-01 RX ADMIN — INSULIN LISPRO 4 UNITS: 100 INJECTION, SOLUTION INTRAVENOUS; SUBCUTANEOUS at 13:50

## 2020-01-01 RX ADMIN — LORAZEPAM 1 MG: 2 INJECTION INTRAMUSCULAR; INTRAVENOUS at 21:26

## 2020-01-01 RX ADMIN — ANTI-FUNGAL POWDER MICONAZOLE NITRATE TALC FREE: 1.42 POWDER TOPICAL at 22:51

## 2020-01-01 RX ADMIN — ATORVASTATIN CALCIUM 20 MG: 20 TABLET, FILM COATED ORAL at 09:56

## 2020-01-01 RX ADMIN — HEPARIN SODIUM 5000 UNITS: 5000 INJECTION INTRAVENOUS; SUBCUTANEOUS at 22:05

## 2020-01-01 RX ADMIN — SODIUM CHLORIDE, PRESERVATIVE FREE 10 ML: 5 INJECTION INTRAVENOUS at 21:50

## 2020-01-01 RX ADMIN — GLYCOPYRROLATE 0.2 MG: 0.2 INJECTION INTRAMUSCULAR; INTRAVENOUS at 10:14

## 2020-01-01 RX ADMIN — LORAZEPAM 1 MG: 2 INJECTION INTRAMUSCULAR; INTRAVENOUS at 03:20

## 2020-01-01 RX ADMIN — SODIUM CHLORIDE, PRESERVATIVE FREE 10 ML: 5 INJECTION INTRAVENOUS at 08:10

## 2020-01-01 RX ADMIN — MORPHINE SULFATE 2 MG: 2 INJECTION, SOLUTION INTRAMUSCULAR; INTRAVENOUS at 21:33

## 2020-01-01 RX ADMIN — LORAZEPAM 1 MG: 2 INJECTION INTRAMUSCULAR; INTRAVENOUS at 22:51

## 2020-01-01 RX ADMIN — FERROUS SULFATE TAB EC 325 MG (65 MG FE EQUIVALENT) 325 MG: 325 (65 FE) TABLET DELAYED RESPONSE at 21:50

## 2020-01-01 RX ADMIN — METRONIDAZOLE 500 MG: 500 INJECTION, SOLUTION INTRAVENOUS at 17:26

## 2020-01-01 RX ADMIN — SODIUM CHLORIDE, PRESERVATIVE FREE 10 ML: 5 INJECTION INTRAVENOUS at 21:33

## 2020-01-01 RX ADMIN — HEPARIN SODIUM 5000 UNITS: 5000 INJECTION INTRAVENOUS; SUBCUTANEOUS at 07:00

## 2020-01-01 RX ADMIN — HEPARIN SODIUM 5000 UNITS: 5000 INJECTION INTRAVENOUS; SUBCUTANEOUS at 21:30

## 2020-01-01 RX ADMIN — LORAZEPAM 1 MG: 2 INJECTION INTRAMUSCULAR; INTRAVENOUS at 10:03

## 2020-01-01 RX ADMIN — METRONIDAZOLE 500 MG: 500 INJECTION, SOLUTION INTRAVENOUS at 06:49

## 2020-01-01 RX ADMIN — CIPROFLOXACIN 400 MG: 2 INJECTION, SOLUTION INTRAVENOUS at 22:50

## 2020-01-01 RX ADMIN — HEPARIN SODIUM 5000 UNITS: 5000 INJECTION INTRAVENOUS; SUBCUTANEOUS at 17:25

## 2020-01-01 RX ADMIN — CIPROFLOXACIN 400 MG: 2 INJECTION, SOLUTION INTRAVENOUS at 21:36

## 2020-01-01 RX ADMIN — Medication 10 ML: at 13:39

## 2020-01-01 RX ADMIN — ATORVASTATIN CALCIUM 20 MG: 20 TABLET, FILM COATED ORAL at 12:31

## 2020-01-01 RX ADMIN — LORAZEPAM 1 MG: 2 INJECTION INTRAMUSCULAR; INTRAVENOUS at 10:14

## 2020-01-01 RX ADMIN — SODIUM CHLORIDE, PRESERVATIVE FREE 10 ML: 5 INJECTION INTRAVENOUS at 20:20

## 2020-01-01 RX ADMIN — MORPHINE SULFATE 2 MG: 2 INJECTION, SOLUTION INTRAMUSCULAR; INTRAVENOUS at 18:41

## 2020-01-01 RX ADMIN — ATORVASTATIN CALCIUM 20 MG: 20 TABLET, FILM COATED ORAL at 08:09

## 2020-01-01 RX ADMIN — SODIUM CHLORIDE, PRESERVATIVE FREE 10 ML: 5 INJECTION INTRAVENOUS at 09:31

## 2020-01-01 RX ADMIN — ACETAMINOPHEN 650 MG: 325 TABLET ORAL at 23:02

## 2020-01-01 RX ADMIN — MORPHINE SULFATE 2 MG: 2 INJECTION, SOLUTION INTRAMUSCULAR; INTRAVENOUS at 00:24

## 2020-01-01 RX ADMIN — HEPARIN SODIUM 5000 UNITS: 5000 INJECTION INTRAVENOUS; SUBCUTANEOUS at 07:17

## 2020-01-01 RX ADMIN — ANTI-FUNGAL POWDER MICONAZOLE NITRATE TALC FREE: 1.42 POWDER TOPICAL at 08:09

## 2020-01-01 RX ADMIN — SODIUM CHLORIDE, PRESERVATIVE FREE 10 ML: 5 INJECTION INTRAVENOUS at 20:30

## 2020-01-01 RX ADMIN — LORAZEPAM 1 MG: 2 INJECTION INTRAMUSCULAR; INTRAVENOUS at 04:26

## 2020-01-01 RX ADMIN — SODIUM CHLORIDE, PRESERVATIVE FREE 10 ML: 5 INJECTION INTRAVENOUS at 09:56

## 2020-01-01 RX ADMIN — DOCUSATE SODIUM 100 MG: 100 CAPSULE, LIQUID FILLED ORAL at 12:30

## 2020-01-01 RX ADMIN — LORAZEPAM 1 MG: 2 INJECTION INTRAMUSCULAR; INTRAVENOUS at 13:46

## 2020-01-01 RX ADMIN — METRONIDAZOLE 500 MG: 500 INJECTION, SOLUTION INTRAVENOUS at 08:09

## 2020-01-01 RX ADMIN — HEPARIN SODIUM 5000 UNITS: 5000 INJECTION INTRAVENOUS; SUBCUTANEOUS at 08:09

## 2020-01-01 RX ADMIN — INSULIN LISPRO 1 UNITS: 100 INJECTION, SOLUTION INTRAVENOUS; SUBCUTANEOUS at 22:21

## 2020-01-01 RX ADMIN — VANCOMYCIN HYDROCHLORIDE 1000 MG: 1 INJECTION, SOLUTION INTRAVENOUS at 16:07

## 2020-01-01 RX ADMIN — SODIUM CHLORIDE, PRESERVATIVE FREE 10 ML: 5 INJECTION INTRAVENOUS at 21:37

## 2020-01-01 RX ADMIN — FAMOTIDINE 20 MG: 10 INJECTION, SOLUTION INTRAVENOUS at 09:56

## 2020-01-01 RX ADMIN — SODIUM CHLORIDE, PRESERVATIVE FREE 10 ML: 5 INJECTION INTRAVENOUS at 09:44

## 2020-01-01 RX ADMIN — HEPARIN SODIUM 5000 UNITS: 5000 INJECTION INTRAVENOUS; SUBCUTANEOUS at 05:27

## 2020-01-01 RX ADMIN — MORPHINE SULFATE 2 MG: 2 INJECTION, SOLUTION INTRAMUSCULAR; INTRAVENOUS at 10:03

## 2020-01-01 RX ADMIN — INSULIN LISPRO 1 UNITS: 100 INJECTION, SOLUTION INTRAVENOUS; SUBCUTANEOUS at 20:19

## 2020-01-01 RX ADMIN — ANTI-FUNGAL POWDER MICONAZOLE NITRATE TALC FREE: 1.42 POWDER TOPICAL at 12:14

## 2020-01-01 RX ADMIN — HEPARIN SODIUM 5000 UNITS: 5000 INJECTION INTRAVENOUS; SUBCUTANEOUS at 16:45

## 2020-01-01 RX ADMIN — METRONIDAZOLE 500 MG: 500 TABLET, FILM COATED ORAL at 13:41

## 2020-01-01 RX ADMIN — LORAZEPAM 1 MG: 2 INJECTION INTRAMUSCULAR; INTRAVENOUS at 13:38

## 2020-01-01 RX ADMIN — Medication 9 MCG/MIN: at 00:07

## 2020-01-01 RX ADMIN — METRONIDAZOLE 500 MG: 500 INJECTION, SOLUTION INTRAVENOUS at 16:31

## 2020-01-01 RX ADMIN — ANTI-FUNGAL POWDER MICONAZOLE NITRATE TALC FREE: 1.42 POWDER TOPICAL at 20:20

## 2020-01-01 RX ADMIN — SODIUM CHLORIDE: 9 INJECTION, SOLUTION INTRAVENOUS at 18:24

## 2020-01-01 RX ADMIN — ACETAMINOPHEN 650 MG: 325 TABLET ORAL at 17:05

## 2020-01-01 RX ADMIN — DOCUSATE SODIUM 100 MG: 100 CAPSULE, LIQUID FILLED ORAL at 21:50

## 2020-01-01 RX ADMIN — FERROUS SULFATE TAB EC 325 MG (65 MG FE EQUIVALENT) 325 MG: 325 (65 FE) TABLET DELAYED RESPONSE at 08:09

## 2020-01-01 RX ADMIN — HEPARIN SODIUM 5000 UNITS: 5000 INJECTION INTRAVENOUS; SUBCUTANEOUS at 13:41

## 2020-01-01 RX ADMIN — DEXTROSE MONOHYDRATE 50 G: 25 INJECTION, SOLUTION INTRAVENOUS at 07:13

## 2020-01-01 RX ADMIN — METRONIDAZOLE 500 MG: 500 INJECTION, SOLUTION INTRAVENOUS at 23:54

## 2020-01-01 RX ADMIN — SODIUM CHLORIDE, PRESERVATIVE FREE 10 ML: 5 INJECTION INTRAVENOUS at 08:47

## 2020-01-01 RX ADMIN — SODIUM CHLORIDE, PRESERVATIVE FREE 10 ML: 5 INJECTION INTRAVENOUS at 09:34

## 2020-01-01 RX ADMIN — MORPHINE SULFATE 2 MG: 2 INJECTION, SOLUTION INTRAMUSCULAR; INTRAVENOUS at 10:51

## 2020-01-01 RX ADMIN — INSULIN LISPRO 8 UNITS: 100 INJECTION, SOLUTION INTRAVENOUS; SUBCUTANEOUS at 18:23

## 2020-01-01 RX ADMIN — SODIUM CHLORIDE, PRESERVATIVE FREE 10 ML: 5 INJECTION INTRAVENOUS at 10:02

## 2020-01-01 RX ADMIN — MORPHINE SULFATE 2 MG: 2 INJECTION, SOLUTION INTRAMUSCULAR; INTRAVENOUS at 21:25

## 2020-01-01 RX ADMIN — ANTI-FUNGAL POWDER MICONAZOLE NITRATE TALC FREE: 1.42 POWDER TOPICAL at 09:34

## 2020-01-01 RX ADMIN — CIPROFLOXACIN 400 MG: 2 INJECTION, SOLUTION INTRAVENOUS at 22:28

## 2020-01-01 RX ADMIN — ANTI-FUNGAL POWDER MICONAZOLE NITRATE TALC FREE: 1.42 POWDER TOPICAL at 09:45

## 2020-01-01 RX ADMIN — SODIUM CHLORIDE 500 ML: 9 INJECTION, SOLUTION INTRAVENOUS at 12:36

## 2020-01-01 RX ADMIN — FERROUS SULFATE TAB EC 325 MG (65 MG FE EQUIVALENT) 325 MG: 325 (65 FE) TABLET DELAYED RESPONSE at 09:56

## 2020-01-01 RX ADMIN — SODIUM CHLORIDE 1000 ML: 9 INJECTION, SOLUTION INTRAVENOUS at 00:31

## 2020-01-01 RX ADMIN — PIPERACILLIN AND TAZOBACTAM 3.38 G: 3; .375 INJECTION, POWDER, FOR SOLUTION INTRAVENOUS at 14:29

## 2020-01-01 RX ADMIN — METRONIDAZOLE 500 MG: 500 INJECTION, SOLUTION INTRAVENOUS at 22:35

## 2020-01-01 RX ADMIN — METRONIDAZOLE 500 MG: 500 INJECTION, SOLUTION INTRAVENOUS at 22:49

## 2020-01-01 RX ADMIN — FERROUS SULFATE TAB EC 325 MG (65 MG FE EQUIVALENT) 325 MG: 325 (65 FE) TABLET DELAYED RESPONSE at 20:20

## 2020-01-01 RX ADMIN — FERROUS SULFATE TAB EC 325 MG (65 MG FE EQUIVALENT) 325 MG: 325 (65 FE) TABLET DELAYED RESPONSE at 09:43

## 2020-01-01 RX ADMIN — ANTI-FUNGAL POWDER MICONAZOLE NITRATE TALC FREE: 1.42 POWDER TOPICAL at 09:57

## 2020-01-01 RX ADMIN — SODIUM CHLORIDE, PRESERVATIVE FREE 10 ML: 5 INJECTION INTRAVENOUS at 09:42

## 2020-01-01 RX ADMIN — METRONIDAZOLE 500 MG: 500 INJECTION, SOLUTION INTRAVENOUS at 06:35

## 2020-01-01 RX ADMIN — HEPARIN SODIUM 5000 UNITS: 5000 INJECTION INTRAVENOUS; SUBCUTANEOUS at 13:00

## 2020-01-01 RX ADMIN — SODIUM CHLORIDE, PRESERVATIVE FREE 10 ML: 5 INJECTION INTRAVENOUS at 22:12

## 2020-01-01 RX ADMIN — SODIUM CHLORIDE 1000 ML: 9 INJECTION, SOLUTION INTRAVENOUS at 13:29

## 2020-01-01 RX ADMIN — MORPHINE SULFATE 2 MG: 2 INJECTION, SOLUTION INTRAMUSCULAR; INTRAVENOUS at 16:05

## 2020-01-01 RX ADMIN — FAMOTIDINE 20 MG: 10 INJECTION, SOLUTION INTRAVENOUS at 09:43

## 2020-01-01 RX ADMIN — CIPROFLOXACIN 250 MG: 250 TABLET, FILM COATED ORAL at 13:41

## 2020-01-01 RX ADMIN — SODIUM CHLORIDE, PRESERVATIVE FREE 10 ML: 5 INJECTION INTRAVENOUS at 21:36

## 2020-01-01 RX ADMIN — METRONIDAZOLE 500 MG: 500 INJECTION, SOLUTION INTRAVENOUS at 07:49

## 2020-01-01 RX ADMIN — FAMOTIDINE 20 MG: 10 INJECTION, SOLUTION INTRAVENOUS at 08:09

## 2020-01-01 RX ADMIN — MORPHINE SULFATE 3 MG: 4 INJECTION INTRAVENOUS at 13:45

## 2020-01-01 RX ADMIN — HEPARIN SODIUM 5000 UNITS: 5000 INJECTION INTRAVENOUS; SUBCUTANEOUS at 22:28

## 2020-01-01 RX ADMIN — METRONIDAZOLE 500 MG: 500 INJECTION, SOLUTION INTRAVENOUS at 22:28

## 2020-01-01 RX ADMIN — ACETAMINOPHEN 650 MG: 325 TABLET ORAL at 19:44

## 2020-01-01 RX ADMIN — FERROUS SULFATE TAB EC 325 MG (65 MG FE EQUIVALENT) 325 MG: 325 (65 FE) TABLET DELAYED RESPONSE at 21:33

## 2020-01-01 RX ADMIN — FOLIC ACID 1 MG: 1 TABLET ORAL at 20:20

## 2020-01-01 RX ADMIN — MORPHINE SULFATE 2 MG: 2 INJECTION, SOLUTION INTRAMUSCULAR; INTRAVENOUS at 13:38

## 2020-01-01 RX ADMIN — SODIUM CHLORIDE, PRESERVATIVE FREE 10 ML: 5 INJECTION INTRAVENOUS at 22:51

## 2020-01-01 RX ADMIN — ATORVASTATIN CALCIUM 20 MG: 20 TABLET, FILM COATED ORAL at 09:43

## 2020-01-01 RX ADMIN — SODIUM CHLORIDE 20 ML: 9 INJECTION, SOLUTION INTRAVENOUS at 17:52

## 2020-01-01 RX ADMIN — MORPHINE SULFATE 2 MG: 2 INJECTION, SOLUTION INTRAMUSCULAR; INTRAVENOUS at 22:51

## 2020-01-01 RX ADMIN — MORPHINE SULFATE 2 MG: 2 INJECTION, SOLUTION INTRAMUSCULAR; INTRAVENOUS at 04:04

## 2020-01-01 RX ADMIN — METRONIDAZOLE 500 MG: 500 INJECTION, SOLUTION INTRAVENOUS at 16:58

## 2020-01-01 RX ADMIN — FAMOTIDINE 20 MG: 10 INJECTION, SOLUTION INTRAVENOUS at 09:31

## 2020-01-01 RX ADMIN — HEPARIN SODIUM 5000 UNITS: 5000 INJECTION INTRAVENOUS; SUBCUTANEOUS at 22:20

## 2020-01-01 RX ADMIN — SODIUM CHLORIDE: 9 INJECTION, SOLUTION INTRAVENOUS at 07:49

## 2020-01-01 RX ADMIN — FERROUS SULFATE TAB EC 325 MG (65 MG FE EQUIVALENT) 325 MG: 325 (65 FE) TABLET DELAYED RESPONSE at 12:31

## 2020-01-01 RX ADMIN — ANTI-FUNGAL POWDER MICONAZOLE NITRATE TALC FREE: 1.42 POWDER TOPICAL at 21:35

## 2020-01-01 RX ADMIN — ANTI-FUNGAL POWDER MICONAZOLE NITRATE TALC FREE: 1.42 POWDER TOPICAL at 21:53

## 2020-01-01 RX ADMIN — SODIUM CHLORIDE, PRESERVATIVE FREE 10 ML: 5 INJECTION INTRAVENOUS at 21:29

## 2020-01-01 RX ADMIN — FAMOTIDINE 20 MG: 10 INJECTION, SOLUTION INTRAVENOUS at 22:12

## 2020-01-01 RX ADMIN — Medication 4 MCG/MIN: at 01:58

## 2020-01-01 RX ADMIN — FERROUS SULFATE TAB EC 325 MG (65 MG FE EQUIVALENT) 325 MG: 325 (65 FE) TABLET DELAYED RESPONSE at 21:36

## 2020-01-01 RX ADMIN — FOLIC ACID 1 MG: 1 TABLET ORAL at 08:09

## 2020-01-01 RX ADMIN — CIPROFLOXACIN 400 MG: 2 INJECTION, SOLUTION INTRAVENOUS at 21:33

## 2020-01-01 RX ADMIN — METRONIDAZOLE 500 MG: 500 INJECTION, SOLUTION INTRAVENOUS at 16:56

## 2020-01-01 RX ADMIN — FAMOTIDINE 20 MG: 10 INJECTION, SOLUTION INTRAVENOUS at 08:47

## 2020-01-01 RX ADMIN — MORPHINE SULFATE 2 MG: 2 INJECTION, SOLUTION INTRAMUSCULAR; INTRAVENOUS at 18:13

## 2020-01-01 RX ADMIN — MORPHINE SULFATE 2 MG: 2 INJECTION, SOLUTION INTRAMUSCULAR; INTRAVENOUS at 04:26

## 2020-01-01 RX ADMIN — ANTI-FUNGAL POWDER MICONAZOLE NITRATE TALC FREE: 1.42 POWDER TOPICAL at 10:12

## 2020-01-01 RX ADMIN — SODIUM CHLORIDE: 9 INJECTION, SOLUTION INTRAVENOUS at 10:21

## 2020-01-01 RX ADMIN — HEPARIN SODIUM 5000 UNITS: 5000 INJECTION INTRAVENOUS; SUBCUTANEOUS at 16:48

## 2020-01-01 RX ADMIN — DOCUSATE SODIUM 100 MG: 100 CAPSULE, LIQUID FILLED ORAL at 09:43

## 2020-01-01 ASSESSMENT — PAIN DESCRIPTION - PAIN TYPE
TYPE: ACUTE PAIN
TYPE_2: ACUTE PAIN
TYPE: ACUTE PAIN

## 2020-01-01 ASSESSMENT — PAIN SCALES - GENERAL
PAINLEVEL_OUTOF10: 3
PAINLEVEL_OUTOF10: 0
PAINLEVEL_OUTOF10: 6
PAINLEVEL_OUTOF10: 0
PAINLEVEL_OUTOF10: 2
PAINLEVEL_OUTOF10: 2
PAINLEVEL_OUTOF10: 0
PAINLEVEL_OUTOF10: 2
PAINLEVEL_OUTOF10: 0
PAINLEVEL_OUTOF10: 8
PAINLEVEL_OUTOF10: 0

## 2020-01-01 ASSESSMENT — PAIN DESCRIPTION - ONSET
ONSET: ON-GOING

## 2020-01-01 ASSESSMENT — ENCOUNTER SYMPTOMS
GASTROINTESTINAL NEGATIVE: 1
ABDOMINAL PAIN: 1
SORE THROAT: 0
CONSTIPATION: 0
SHORTNESS OF BREATH: 1
CHEST TIGHTNESS: 0
RESPIRATORY NEGATIVE: 1
DIARRHEA: 0
NAUSEA: 0
GASTROINTESTINAL NEGATIVE: 1
WHEEZING: 0
VOMITING: 0
SHORTNESS OF BREATH: 0
BLOOD IN STOOL: 0

## 2020-01-01 ASSESSMENT — PAIN DESCRIPTION - FREQUENCY
FREQUENCY: INTERMITTENT

## 2020-01-01 ASSESSMENT — PAIN SCALES - WONG BAKER
WONGBAKER_NUMERICALRESPONSE: 0
WONGBAKER_NUMERICALRESPONSE: 2
WONGBAKER_NUMERICALRESPONSE: 0
WONGBAKER_NUMERICALRESPONSE: 0

## 2020-01-01 ASSESSMENT — PAIN DESCRIPTION - LOCATION
LOCATION: NECK
LOCATION: GENERALIZED
LOCATION: GENERALIZED
LOCATION_2: LEG

## 2020-01-01 ASSESSMENT — PAIN DESCRIPTION - ORIENTATION
ORIENTATION: MID
ORIENTATION_2: LEFT
ORIENTATION: MID
ORIENTATION: MID

## 2020-01-01 ASSESSMENT — PAIN - FUNCTIONAL ASSESSMENT: PAIN_FUNCTIONAL_ASSESSMENT: PREVENTS OR INTERFERES SOME ACTIVE ACTIVITIES AND ADLS

## 2020-01-01 ASSESSMENT — PULMONARY FUNCTION TESTS: PIF_VALUE: 12

## 2020-01-01 ASSESSMENT — PAIN DESCRIPTION - DESCRIPTORS
DESCRIPTORS: DISCOMFORT;PRESSURE
DESCRIPTORS: PRESSURE
DESCRIPTORS: CRAMPING

## 2020-08-06 PROBLEM — U07.1 COVID-19 WITH MULTIPLE COMORBIDITIES: Status: ACTIVE | Noted: 2020-01-01

## 2020-08-06 NOTE — ED NOTES
Bed: 12  Expected date:   Expected time:   Means of arrival:   Comments:     Jung Machuca RN  08/06/20 [id number]

## 2020-08-06 NOTE — ED TRIAGE NOTES
Syncopal episode while on toilet. C-collar in place from EMS. Hypotensive. Dopamine started. 200 ml of fluid given by ems. Dopamine off by arrival to ER.  ST upon arrival. C/o of neck pain

## 2020-08-06 NOTE — ED PROVIDER NOTES
[facility]. 3  Emergency Department Encounter  EmergencyMedicine Resident     Pt Jimi Hodgson  MRN: [id number]  Luisitogfshahla 10/4/1923  Date of evaluation: 8/6/20  PCP:  Noah Shabazz MD    [address] [place] [address]       Chief Complaint   Patient presents with    Loss of Consciousness     had syncopal episode on bathroom, incontinent of bowels. neck pain. low bp, dopamine started by ems. shut off by arrival       HISTORY OF PRESENT ILLNESS  (Location/Symptom, Timing/Onset, Context/Setting, Quality, Duration, Modifying Factors, Severity.)      Liliya Chang is a 80 y.o. female who presents after syncopal event. Patient resides at a nursing home, was found between the bathroom wall and the toilet with presumed syncopal event. Patient is amnestic to events, does not remember falling, reports neck pain, but is in a cervical collar. Patient is also complaining of right knee pain, dysuria and suprapubic abdominal pain. Patient has no other complaints. Patient denies any headache, head injury, fevers, chills, cough, congestion, chest pain, shortness of breath, diarrhea, constipation. Patient has history of CKD, DM, CHF, HTN, hypothyroid. PAST MEDICAL / SURGICAL / SOCIAL / FAMILY HISTORY      has a past medical history of Chronic kidney disease, Constipated, Diabetes mellitus ([address] Falls, Heart failure ([address] [place] (hard of hearing), Hypertension, Hypothyroid, Muscle wasting, Muscle weakness, Pneumonia, and Sarcopenia. has no past surgical history on file. Social History     Socioeconomic History    Marital status:       Spouse name: Not on file    Number of children: Not on file    Years of education: Not on file    Highest education level: Not on file   Occupational History    Not on file   Social Needs    Financial resource strain: Not on file    Food insecurity     Worry: Not on file     Inability: Not on file    Transportation needs     Medical: Not on file     Non-medical: Not on file Tobacco Use    Smoking status: Never Smoker    Smokeless tobacco: Never Used   Substance and Sexual Activity    Alcohol use: Not Currently    Drug use: Not Currently    Sexual activity: Not Currently   Lifestyle    Physical activity     Days per week: Not on file     Minutes per session: Not on file    Stress: Not on file   Relationships    Social connections     Talks on phone: Not on file     Gets together: Not on file     Attends [employer] service: Not on file     Active member of club or organization: Not on file     Attends meetings of clubs or organizations: Not on file     Relationship status: Not on file    Intimate partner violence     Fear of current or ex partner: Not on file     Emotionally abused: Not on file     Physically abused: Not on file     Forced sexual activity: Not on file   Other Topics Concern    Not on file   Social History Narrative    Not on file       History reviewed. No pertinent family history. Allergies:  Patient has no known allergies. Home Medications:  Prior to Admission medications    Not on File       REVIEW OF SYSTEMS    (2-9 systems for level 4, 10 or more for level 5)      Review of Systems   Constitutional: Negative for chills, diaphoresis, fatigue and fever. HENT: Negative for congestion and sore throat. Eyes: Negative for visual disturbance. Respiratory: Negative for chest tightness, shortness of breath and wheezing. Cardiovascular: Negative for chest pain, palpitations and leg swelling. Gastrointestinal: Positive for abdominal pain. Negative for blood in stool, constipation, diarrhea, nausea and vomiting. Genitourinary: Positive for dysuria. Negative for frequency, hematuria and urgency. Musculoskeletal: Positive for arthralgias (Right knee pain) and neck pain. Skin: Negative for pallor and rash. Neurological: Negative for dizziness, syncope, weakness, light-headedness and headaches. Psychiatric/Behavioral: Negative for confusion. PHYSICAL EXAM   (up to 7 for level 4, 8 or more for level 5)      INITIAL VITALS:   BP (!) 92/48   Pulse 98   Temp 97.1 °F (36.2 °C) (Axillary)   Resp 20   Ht 5' 3\" (1.6 m)   Wt 148 lb 9.4 oz (67.4 kg)   SpO2 99%   BMI 26.32 kg/m²     Physical Exam  Constitutional:       General: She is not in acute distress. Appearance: She is well-developed. She is ill-appearing. She is not toxic-appearing or diaphoretic. HENT:      Head: Normocephalic and atraumatic. Right Ear: External ear normal.      Left Ear: External ear normal.      Nose: Nose normal.      Mouth/Throat:      Mouth: Mucous membranes are moist.   Eyes:      General:         Right eye: No discharge. Left eye: No discharge. Extraocular Movements: Extraocular movements intact. Pupils: Pupils are equal, round, and reactive to light. Neck:      Musculoskeletal: Normal range of motion and neck supple. No neck rigidity or muscular tenderness. Vascular: No JVD. Trachea: No tracheal deviation. Cardiovascular:      Rate and Rhythm: Tachycardia present. Rhythm irregular. Pulses: Normal pulses. Heart sounds: Normal heart sounds. No murmur. No friction rub. No gallop. Pulmonary:      Effort: Pulmonary effort is normal. No respiratory distress. Breath sounds: Normal breath sounds. No stridor. No wheezing, rhonchi or rales. Chest:      Chest wall: No tenderness. Abdominal:      General: There is no distension. Palpations: Abdomen is soft. There is no mass. Tenderness: There is abdominal tenderness. There is no right CVA tenderness, left CVA tenderness or guarding. Comments: Suprapubic tenderness, no other abdominal tenderness, no masses, no CVA tenderness bilaterally. Genitourinary:     General: Normal vulva. Vagina: No vaginal discharge. Musculoskeletal: Normal range of motion. General: Tenderness present.       Comments: Right knee tenderness   Skin:     General: Skin is warm. Capillary Refill: Capillary refill takes less than 2 seconds. Neurological:      General: No focal deficit present. Mental Status: She is alert. Cranial Nerves: No cranial nerve deficit. Sensory: No sensory deficit. Comments: Alert and oriented to self and place, not to time, no cranial nerve deficits, sensation to light touch intact, strength 5/5 in all extremities.    Psychiatric:         Mood and Affect: Mood normal.         Behavior: Behavior normal.         DIFFERENTIAL  DIAGNOSIS     PLAN (LABS / IMAGING / EKG):  Orders Placed This Encounter   Procedures    Culture, Blood 1    Culture, Blood 1    CT Head WO Contrast    CT Cervical Spine WO Contrast    CT THORACIC SPINE WO CONTRAST    CT LUMBAR SPINE WO CONTRAST    XR KNEE LEFT (3 VIEWS)    XR CHEST PORTABLE    CT CHEST ABDOMEN PELVIS WO CONTRAST    CBC Auto Differential    Basic Metabolic Panel w/ Reflex to MG    Troponin    Brain Natriuretic Peptide    Protime-INR    APTT    Urinalysis Reflex to Culture    Lactic Acid, Plasma    Hemoglobin and hematocrit, blood    SODIUM (POC)    POTASSIUM (POC)    CHLORIDE (POC)    CALCIUM, IONIC (POC)    CK    Immature Platelet Fraction    Hepatic Function Panel    Troponin    Lactate, Sepsis    COVID-19    Microscopic Urinalysis    CBC Auto Differential    Comprehensive Metabolic Panel w/ Reflex to MG    D-Dimer, Quantitative    Troponin    Troponin    Brain Natriuretic Peptide    TSH with Reflex    Hemoglobin A1C    Ferritin    Comprehensive Metabolic Panel w/ Reflex to MG    Lactate Dehydrogenase    D-Dimer, Quantitative    DIET GENERAL; Carb Control: 4 carb choices (60 gms)/meal    Insert indwelling urinary catheter    Discontinue indwelling urinary catheter when documented indications no longer apply per hospital policy    Vital signs per unit routine    Notify physician    Up with assistance    Daily weights    Intake and output  PPE Instructions    Misc nursing order (specify)    Full Code    Inpatient consult to Infectious Diseases    Inpatient consult to Cardiology    Droplet Plus Isolation    OT eval and treat    PT evaluation and treat    Initiate Oxygen Therapy Protocol    Pulse Oximetry Spot Check    Venous Blood Gas, POC    Creatinine W/GFR Point of Care    Lactic Acid, POC    POCT Glucose    Anion Gap (Calc) POC    POCT Glucose    EKG 12 Lead    EKG 12 Lead    PATIENT STATUS (FROM ED OR OR/PROCEDURAL) Inpatient       MEDICATIONS ORDERED:  Orders Placed This Encounter   Medications    0.9 % sodium chloride bolus    0.9 % sodium chloride bolus    vancomycin (VANCOCIN) 1000 mg in dextrose 5% 200 mL IVPB    piperacillin-tazobactam (ZOSYN) 3.375 g in dextrose 5 % 50 mL IVPB (mini-bag)    sodium chloride flush 0.9 % injection 10 mL    sodium chloride flush 0.9 % injection 10 mL    DISCONTD: potassium chloride (KLOR-CON M) extended release tablet 40 mEq    DISCONTD: potassium bicarb-citric acid (EFFER-K) effervescent tablet 40 mEq    DISCONTD: potassium chloride 10 mEq/100 mL IVPB (Peripheral Line)    DISCONTD: magnesium sulfate 1 g in dextrose 5% 100 mL IVPB    OR Linked Order Group     acetaminophen (TYLENOL) tablet 650 mg     acetaminophen (TYLENOL) suppository 650 mg    polyethylene glycol (GLYCOLAX) packet 17 g    OR Linked Order Group     promethazine (PHENERGAN) tablet 12.5 mg     ondansetron (ZOFRAN) injection 4 mg    heparin (porcine) injection 5,000 Units    DISCONTD: enoxaparin (LOVENOX) injection 30 mg    0.9 % sodium chloride infusion    famotidine (PEPCID) injection 20 mg    insulin lispro (HUMALOG) injection vial 0-12 Units    insulin lispro (HUMALOG) injection vial 0-6 Units       DDX: Differential includes ACS, pneumonia, COVID, UTI, vasovagal, arrhythmia, anemia, intracranial abnormality, CHF    DIAGNOSTIC RESULTS / EMERGENCY DEPARTMENT COURSE / MDM   LAB RESULTS:  Results for 14 ng/L    Troponin T NOT REPORTED <0.03 ng/mL    Troponin Interp NOT REPORTED    Lactate, Sepsis   Result Value Ref Range    Lactic Acid, Sepsis NOT REPORTED 0.5 - 1.9 mmol/L    Lactic Acid, Sepsis, Whole Blood 3.0 (H) 0.5 - 1.9 mmol/L   COVID-19    Specimen: Other   Result Value Ref Range    SARS-CoV-2          SARS-CoV-2, Rapid DETECTED (A) Not Detected    Source . NASOPHARYNGEAL SWAB     SARS-CoV-2, PCR         Microscopic Urinalysis   Result Value Ref Range    -          WBC, UA None 0 - 5 /HPF    RBC, UA None 0 - 4 /HPF    Casts UA  0 - 8 /LPF     2 TO 5 HYALINE Reference range defined for non-centrifuged specimen. Crystals, UA NOT REPORTED None /HPF    Epithelial Cells UA 0 TO 2 0 - 5 /HPF    Renal Epithelial, UA NOT REPORTED 0 /HPF    Bacteria, UA NOT REPORTED None    Mucus, UA NOT REPORTED None    Trichomonas, UA NOT REPORTED None    Amorphous, UA NOT REPORTED None    Other Observations UA NOT REPORTED NOT REQ.     Yeast, UA NOT REPORTED None   Protime-INR   Result Value Ref Range    Protime 10.7 9.0 - 12.0 sec    INR 1.0    APTT   Result Value Ref Range    PTT 23.2 20.5 - 30.5 sec   D-Dimer, Quantitative   Result Value Ref Range    D-Dimer, Quant 4.56 mg/L FEU   Ferritin   Result Value Ref Range    Ferritin 542 (H) 13 - 150 ug/L   Comprehensive Metabolic Panel w/ Reflex to MG   Result Value Ref Range    Glucose 337 (H) 70 - 99 mg/dL    BUN 82 (H) 8 - 23 mg/dL    CREATININE 2.59 (H) 0.50 - 0.90 mg/dL    Bun/Cre Ratio NOT REPORTED 9 - 20    Calcium 8.6 8.6 - 10.4 mg/dL    Sodium 134 (L) 135 - 144 mmol/L    Potassium 4.9 3.7 - 5.3 mmol/L    Chloride 100 98 - 107 mmol/L    CO2 20 20 - 31 mmol/L    Anion Gap 14 9 - 17 mmol/L    Alkaline Phosphatase 144 (H) 35 - 104 U/L    ALT 43 (H) 5 - 33 U/L    AST 64 (H) <32 U/L    Total Bilirubin 0.62 0.3 - 1.2 mg/dL    Total Protein 4.9 (L) 6.4 - 8.3 g/dL    Alb 2.7 (L) 3.5 - 5.2 g/dL    Albumin/Globulin Ratio 1.2 1.0 - 2.5    GFR Non-African American 17 (L) >60 mL/min mucosal thickening of the maxillary sinuses. No air-fluid levels are identified. Patchy opacification of the bilateral mastoid air cells. SOFT TISSUES/SKULL:  No acute abnormality of the visualized skull or soft tissues. No acute intracranial abnormality. Chronic appearing paranasal sinus disease. Bilateral mastoiditis. Ct Cervical Spine Wo Contrast    Result Date: 8/6/2020  EXAMINATION: CT OF THE CERVICAL SPINE WITHOUT CONTRAST 8/6/2020 1:44 pm TECHNIQUE: CT of the cervical spine was performed without the administration of intravenous contrast. Multiplanar reformatted images are provided for review. Dose modulation, iterative reconstruction, and/or weight based adjustment of the mA/kV was utilized to reduce the radiation dose to as low as reasonably achievable. COMPARISON: None. HISTORY: ORDERING SYSTEM PROVIDED HISTORY: Fall TECHNOLOGIST PROVIDED HISTORY: Fall Reason for Exam: History: This is a 80 y.o. female who presents to the Emergency Department with complaint of being found on the floor of bathroom, patient remembers getting up to use the bathroom, and passed out while having a bowel movement. Patient found to be hypotensive by EMS, and received 200 ml of IVF, and BP 88 systolic and was started on dopamine. Patient awake and alert upon arrival Acuity: Acute Type of Exam: Unknown FINDINGS: BONES/ALIGNMENT: There is no acute fracture or traumatic malalignment. DEGENERATIVE CHANGES: Mild-moderate multilevel degenerative changes seen, with multilevel facet arthrosis; fusion facet joints at C3-C4 bilaterally. Additional mild uncovertebral arthropathy at multiple levels. Intervertebral disc space narrowing, best seen C2-C3, C3-C4 and C6-C7. Mild nonacute appearing anterolisthesis C5 on C6 and C7 on T1. Compression deformity T4 which has a more chronic appearance, with increased density. SOFT TISSUES: There is no prevertebral soft tissue swelling.  Fibrotic/atelectatic changes both lung bases, greater on the left. Calcified granuloma right lung apex. No pneumothorax or definite acute contusion. Extensive calcific ASVD aortic arch, origins major branch vessels and carotid bifurcations. No acute abnormality of the cervical spine. Probable old compression deformity T4; correlate clinically. Osteopenia, multilevel degenerative changes and additional findings, as described in detail above. Ct Thoracic Spine Wo Contrast    Result Date: 8/6/2020  EXAMINATION: CT OF THE THORACIC SPINE WITHOUT CONTRAST  8/6/2020 1:44 pm: TECHNIQUE: CT of the thoracic spine was performed without the administration of intravenous contrast. Multiplanar reformatted images are provided for review. Dose modulation, iterative reconstruction, and/or weight based adjustment of the mA/kV was utilized to reduce the radiation dose to as low as reasonably achievable. COMPARISON: CT scan of the cervical spine from earlier the same day. HISTORY: ORDERING SYSTEM PROVIDED HISTORY: fall TECHNOLOGIST PROVIDED HISTORY: fall Reason for Exam: History: This is a 80 y.o. female who presents to the Emergency Department with complaint of being found on the floor of bathroom, patient remembers getting up to use the bathroom, and passed out while having a bowel movement. Patient found to be hypotensive by EMS, and received 200 ml of IVF, and BP 88 systolic and was started on dopamine. Patient awake and alert upon arrival Acuity: Acute Type of Exam: Unknown FINDINGS: BONES/ALIGNMENT: Osteopenia. Moderate-severe kyphoscoliosis. Moderate-severe compression deformity T4 with increased vertebral body density. Vertebral body heights otherwise are maintained. No osseous destructive lesion is seen. DEGENERATIVE CHANGES: Mild-moderate multilevel degenerative changes with spondylosis. , most severe at T12 with DDD evident T12-L1, including disc space narrowing, endplate sclerosis and vacuum phenomenon.   No gross spinal canal stenosis or severe bony neural foraminal narrowing of the thoracic spine suspected. SOFT TISSUES: No paraspinal mass is seen. Interstitial and some ground-glass appearing opacities both lungs (see separate report for CT chest). Heavy calcific ASVD aorta. Chronic appearing moderate-severe compression deformity T4. An acute element should be considered if there is localized tenderness at this level. No additional thoracic spine fracture identified. Mild-moderate multilevel degenerative and additional findings, as above RECOMMENDATIONS: MRI could be obtained to assess for possible edema, if indicated clinically. Ct Lumbar Spine Wo Contrast    Result Date: 8/6/2020  EXAMINATION: CT OF THE LUMBAR SPINE WITHOUT CONTRAST  8/6/2020 TECHNIQUE: CT of the lumbar spine was performed without the administration of intravenous contrast. Multiplanar reformatted images are provided for review. Dose modulation, iterative reconstruction, and/or weight based adjustment of the mA/kV was utilized to reduce the radiation dose to as low as reasonably achievable. COMPARISON: None HISTORY: ORDERING SYSTEM PROVIDED HISTORY: fall TECHNOLOGIST PROVIDED HISTORY: fall Reason for Exam: History: This is a 80 y.o. female who presents to the Emergency Department with complaint of being found on the floor of bathroom, patient remembers getting up to use the bathroom, and passed out while having a bowel movement. Patient found to be hypotensive by EMS, and received 200 ml of IVF, and BP 88systolic and was started on dopamine. Patient awake and alert upon arrival Acuity: Acute Type of Exam: Unknown FINDINGS: BONES/ALIGNMENT: There is convex-right curvature and 5 mm anterolisthesis L5 on S1, chronic in appearance. The vertebral body heights are generally maintained. No osseous destructive lesion is seen.  DEGENERATIVE CHANGES: Multilevel degenerative changes and disc space narrowing; marked narrowing seen at L1-L, L2-L3 and L3-L4 with partial fusion anteriorly at the latter. Milder disc space narrowing L4-L5 and L5-S1 with vacuum phenomenon at multiple levels, endplate changes and spondylosis. Multilevel facet arthropathy also seen, more severe inferiorly. Varying degrees of mild to moderate impingement on the canal and neural foramina, latter probably greatest L5-S1. No severe narrowing suspected. SOFT TISSUES/RETROPERITONEUM: No paraspinal mass is seen. Additional findings in the abdomen as discussed in a separate report. No acute lumbar spine fracture. Multilevel degenerative changes/DDD, as described. Xr Chest Portable    Result Date: 8/6/2020  EXAMINATION: ONE XRAY VIEW OF THE CHEST 8/6/2020 2:22 pm COMPARISON: CT scan of the chest from 08/06/2020, earlier the same day. HISTORY: ORDERING SYSTEM PROVIDED HISTORY: fall TECHNOLOGIST PROVIDED HISTORY: fall Reason for Exam: Fall today/  AP supine/ port Acuity: Acute Type of Exam: Initial FINDINGS: Overlying ECG monitor leads and gown snaps. Cardiac silhouette within normal limits in size for AP technique. Prominent mediastinal shadow with some calcification aortic knob, appropriate for rotation to the right. Vascular prominence, accentuated by supine technique. Patchy interstitial and ground-glass opacities, as better demonstrated on CT. No sizable pleural effusion or pneumothorax. No rib fracture identified. Mild DJD spine and shoulders. Patchy airspace and interstitial findings, better demonstrated on CT. Correlate for edema versus pneumonia. Ct Chest Abdomen Pelvis Wo Contrast    Result Date: 8/6/2020  EXAMINATION: CT OF THE CHEST, ABDOMEN, AND PELVIS WITHOUT CONTRAST 8/6/2020 1:44 pm TECHNIQUE: CT of the chest, abdomen and pelvis was performed without the administration of intravenous contrast. Multiplanar reformatted images are provided for review.  Dose modulation, iterative reconstruction, and/or weight based adjustment of the mA/kV was utilized to reduce the radiation dose to as low as reasonably achievable. COMPARISON: None HISTORY: ORDERING SYSTEM PROVIDED HISTORY: Abdominal pain TECHNOLOGIST PROVIDED HISTORY: Abdominal pain Reason for Exam: History: This is a 80 y.o. female who presents to the Emergency Department with complaint of being found on the floor of bathroom, patient remembers getting up to use the bathroom, and passed out while having a bowel movement. Patient found to be hypotensive by EMS, and received 200 ml of IVF, and BP 88systolic and was started on dopamine. Patient awake and alert upon arrival Acuity: Acute Type of Exam: Unknown FINDINGS: Chest: Mediastinum: The thoracic aorta is normal in course and caliber with diffuse atherosclerotic plaque. Decreased attenuation of the blood pool consistent with anemia. The heart is not enlarged. Calcification at the level of the mitral annulus and scattered coronary vascular calcification. No pericardial effusion. No pathologic mediastinal adenopathy. Small hiatal hernia. Lungs/pleura: Patchy ground-glass opacification throughout the lungs, predominantly in a peribronchial distribution. No focal consolidation, significant pleural fluid or pneumothorax. Mild peribronchial thickening throughout the lungs. Soft Tissues/Bones: Bones are diffusely osteopenic. There is a 50% compression fracture of the T5 vertebral body. No other acute osseous findings. Abdomen/Pelvis: Organs: The unenhanced liver, spleen and adrenal glands are unremarkable. The pancreas is atrophic. No acute biliary findings. 2.2 x 2.8 cm left renal lesion containing fat consistent with an angiomyolipoma. No significant hydronephrosis. No evidence of obstructive uropathy. GI/Bowel: Colonic diverticulosis. There are focal pericolonic inflammatory changes in the proximal sigmoid colon. No significant small bowel distension. The stomach and duodenal sweep are intact. Pelvis: There is no pelvic mass or free pelvic fluid. The uterus is absent.  Mild distention of the urinary bladder. Peritoneum/Retroperitoneum: The abdominal aorta is normal in caliber. Moderate aortoiliac atherosclerotic plaque. No retroperitoneal adenopathy or upper abdominal ascites. No significant intra-abdominal hematoma. Bones/Soft Tissues: No acute osseous or soft tissue abnormality. 1. Patchy ground-glass opacification within the lungs, predominantly peribronchial in distribution, possibly mild edema or infiltrate. 2. Atherosclerotic calcification in the aorta and coronary circulation. No aneurysm. 3. Uncomplicated diverticulitis involving the proximal sigmoid colon. No evidence of perforation or abscess. No free fluid. 4. 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 5. T5 compression fracture. See CT of the thoracic and lumbar spine for complete details. EKG  EKG Interpretation -[id number]    Interpreted by me    Rhythm: Atrial fibrillation with RVR  Rate: 129  Axis: normal  Ectopy: none  Conduction: Atrial fibrillation  ST Segments: no acute change  T Waves: no acute change  Q Waves: none    Clinical Impression: Atrial fibrillation with RVR    All EKG's are interpreted by the Emergency Department Physician who either signs or Co-signs this chart in the absence of a cardiologist.    EKG Interpretation -[id number]    Interpreted by me    Rhythm: normal sinus   Rate: normal  Axis: normal  Ectopy: none  Conduction: normal  ST Segments: no acute change  T Waves: no acute change  Q Waves: none    Clinical Impression: Sinus arrhythmia, no atrial fibrillation, no ST changes,    EMERGENCY DEPARTMENT COURSE:  Patient came to emergency department, HPI and physical exam were conducted. All nursing notes were reviewed. Initial EKG showed atrial fibrillation, initial troponin 69, follow-up troponin 81, repeat EKG showed sinus arrhythmia without ST or T wave changes. Bedside ultrasound significant for adequate function, no pericardial effusion, no wall motion abnormalities.   Consulted cardiology who recommended continue to trend troponins, no heparin at this time. Patient was also found to have AK I on CKD with creatinine 3.02. Patient was hypotensive on arrival, administered 30 mL/kg IV fluids, blood pressure responded, tachycardia resolved. Started patient on vancomycin and Zosyn for presumed sepsis. Imaging was negative for acute osseous abnormality, significant for left-sided uncomplicated diverticulitis. Urinalysis negative for UTI. Imaging significant for groundglass opacity, COVID test positive. Patient admitted to [facility] ICU for further assessment and management. Sepsis Times and Checklist  Vital Signs: BP: (!) 92/48  Pulse: 98  Resp: 20  Temp: 97.1 °F (36.2 °C) SpO2: 99 %  SIRS (>2)   Temp > 38.0C or < 36C   HR > 90   RR > 20   WBC > 12 or < 4 or >10% bands  SIRS (>2) and confirmed or suspected source of infection = Sepsis  Is Sepsis due to likely bacterial infection?: Yes    Severe Sepsis Identified:  Sepsis Orders:  ·  CBC: Yes  ·  CMP: Yes  ·  PT/PTT: Yes  ·  Blood Cultures x2: Yes  ·  Urinalysis and Urine Culture: Yes  ·  Lactate: Yes  ·  Broad Spectrum Antibiotics Given (within 3 hours of sepsis identification,  after blood cultures):  Yes    (If unable to obtain IV access for IV antibiotics within 3 hours of  identification of sepsis, IM antibiotics is acceptable.)  ·             If lactate >2.0 MUST repeat within 6 hours    If elevated, is elevated lactate from a likely infectious source?: Yes. IV Fluid Bolus:  Is lactate > 4.0:  Yes  If lactate >  4.0 OR hypotension (MAP<65 mmHg) (with 2 BP readings) 30ml/kg crystalloid MUST be ordered. Fluids must be initiated within 3 hours of sepsis identification. These fluids must have a rate > 125 ml/hr. · Is the patient Morbidly Obese (BMI > 30):  No  · IV Fluids given prior to arrival can be used in this calculation but the following information must be documented: Administered 2 L normal saline, which was 30 mL/kg  · Does the patient or patient advocate refuse the entire 30 ml/kg IV fluid bolus? No          First troponin 69, second troponin 81, consulted cardiology, recommended trending troponins, will see in the morning. As patient is anemic, cardiology recommended not starting heparin at this time. PROCEDURES:  PROCEDURE NOTE - CENTRAL VENOUS LINE PLACEMENT    PATIENT NAME: Kanu [address] RECORD NO. [id number]  DATE: 8/6/2020  ATTENDING PHYSICIAN: Dr. Natalie Sinclair DIAGNOSIS:  vascular access  POSTOPERATIVE DIAGNOSIS:  Same  PROCEDURE PERFORMED:  Right Femoral Vein Central Line Insertion  PERFORMING PHYSICIAN: Liz Heredia MD  ANESTHESIA:  Local utilizing 1% lidocaine  ESTIMATED BLOOD LOSS:  Less than 25 ml  COMPLICATIONS:  None immediately appreciated. DISCUSSION:  Ericka Leroy is a 80y.o.-year-old female who requires central IV access vascular access. The history and physical examination were reviewed and confirmed. CONSENT: The patient provided verbal consent for this procedure. PROCEDURE:  A timeout was initiated by the bedside nurse and was confirmed by those present. The patient was placed in a supine position. The skin overlying the Right Femoral Vein was prepped with chlorhexadine and draped in sterile fashion. The skin was infiltrated with local anesthetic. The vessel and surrounding anatomy was visualized using ultrasound. Through the anesthetized region, the introducer needle was inserted into the femoral vein returning dark red non pulsatile blood. A guidewire was placed through the center of the needle with no resistance. Ultrasound confirmed presence of wire in the vein. A small incision made in the skin with a #11 scalpel blade. The dilator was inserted into the skin and vein over guidewire using Seldinger technique. The dilator was then removed and the 7 F 24 cm catheter was placed in the vein over the guidewire using Seldinger technique.  The guidewire was then removed and all ports aspirated and flushed

## 2020-08-06 NOTE — ED PROVIDER NOTES
is tachycardic on exam with an irregularly irregular rhythm    EKG Interpretation    Interpreted by me    EKG shows atrial fibrillation with a ventricular rate of 129, QRS of 70 QTC of 424. Normal axis Q waves noted in lead III. Syncope, fall unclear etiology she is A. fib RVR which appears new onset. We will plan on bedside echo, fluid resuscitation check labs, CT imaging of head and neck as well as chest abdomen and pelvis and likely admission.     Sybil Enrique D.O, M.P.H  Attending Emergency Medicine Physician         Sybil Enrique,   08/06/20 1664

## 2020-08-06 NOTE — PROGRESS NOTES
12 [facility] - Dr. Izabel Chavez at bedside for central line placement. Ultrasound at bedside  1256 - Received verbal order for an additional liter. 1 liter of NS started. Patient complaining of neck pain. 1315 - Lab work sent. Central line placed.    1329 - [facility] results given to provider  [phone] - To CT scan  1358 - Dr. Izabel Chavez notified of trop  1405 - Received verbal order for aburto insertion for strict I&Os

## 2020-08-06 NOTE — H&P
Hypertension     Hypothyroid     Muscle wasting     Muscle weakness     Pneumonia     Sarcopenia         Past Surgical History:     History reviewed. No pertinent surgical history. Medications Prior to Admission:     Prior to Admission medications    Not on File        Allergies:     Patient has no known allergies. Social History:     Tobacco:    reports that she has never smoked. She has never used smokeless tobacco.  Alcohol:      reports previous alcohol use. Drug Use:  reports previous drug use. Family History:     History reviewed. No pertinent family history. Review of Systems:     Positive and Negative as described in HPI. Review of Systems   Unable to perform ROS: Mental status change       Physical Exam:   /76   Pulse 93   Temp 97.6 °F (36.4 °C) (Axillary)   Resp 20   Ht 5' 3\" (1.6 m)   Wt 150 lb (68 kg)   SpO2 95%   BMI 26.57 kg/m²   Temp (24hrs), Av.6 °F (36.4 °C), Min:97.6 °F (36.4 °C), Max:97.6 °F (36.4 °C)    Recent Labs     20  1321   POCGLU 261*       Intake/Output Summary (Last 24 hours) at 2020 1638  Last data filed at 2020 1434  Gross per 24 hour   Intake 2000 ml   Output 175 ml   Net 1825 ml       Physical Exam  Constitutional:       General: She is not in acute distress. Appearance: She is obese. She is not toxic-appearing. HENT:      Head: Normocephalic. Mouth/Throat:      Mouth: Mucous membranes are moist.      Pharynx: Oropharynx is clear. No oropharyngeal exudate. Eyes:      General: No scleral icterus. Pupils: Pupils are equal, round, and reactive to light. Neck:      Musculoskeletal: Muscular tenderness present. Cardiovascular:      Rate and Rhythm: Normal rate and regular rhythm. Heart sounds: No murmur. Pulmonary:      Effort: Pulmonary effort is normal.      Breath sounds: Normal breath sounds. Abdominal:      General: Bowel sounds are normal.      Palpations: Abdomen is soft. Tenderness:  There is abdominal tenderness (mild generalized but more so to suprapubic). Comments: Lujan with clear marai luz urine, FMS in place but no output   Musculoskeletal:         General: No swelling or tenderness. Lymphadenopathy:      Cervical: No cervical adenopathy. Skin:     General: Skin is warm and dry. Capillary Refill: Capillary refill takes less than 2 seconds. Coloration: Skin is pale. Comments: Bruising to both arms   Neurological:      General: No focal deficit present. Mental Status: She is alert. Cranial Nerves: No cranial nerve deficit.       Comments: Oriented to self and time, knows she is in a hospital but thought this was [facility]   Psychiatric:         Mood and Affect: Mood normal.         Investigations:      Laboratory Testing:  Recent Results (from the past 24 hour(s))   CBC Auto Differential    Collection Time: 08/06/20  1:15 PM   Result Value Ref Range    WBC 10.7 3.5 - 11.3 k/uL    RBC 3.08 (L) 3.95 - 5.11 m/uL    Hemoglobin 10.2 (L) 11.9 - 15.1 g/dL    Hematocrit 31.3 (L) 36.3 - 47.1 %    .6 82.6 - 102.9 fL    MCH 33.1 25.2 - 33.5 pg    MCHC 32.6 28.4 - 34.8 g/dL    RDW 13.4 11.8 - 14.4 %    Platelets See Reflexed IPF Result 138 - 453 k/uL    MPV NOT REPORTED 8.1 - 13.5 fL    NRBC Automated 0.0 0.0 per 100 WBC    Differential Type NOT REPORTED     WBC Morphology NOT REPORTED     RBC Morphology NOT REPORTED     Platelet Estimate NOT REPORTED     Seg Neutrophils 87 (H) 36 - 65 %    Lymphocytes 2 (L) 24 - 43 %    Monocytes 7 3 - 12 %    Eosinophils % 0 (L) 1 - 4 %    Basophils 0 0 - 2 %    Immature Granulocytes 4 (H) 0 %    Segs Absolute 9.27 (H) 1.50 - 8.10 k/uL    Absolute Lymph # 0.22 (L) 1.10 - 3.70 k/uL    Absolute Mono # 0.70 0.10 - 1.20 k/uL    Absolute Eos # <0.03 0.00 - 0.44 k/uL    Basophils Absolute <0.03 0.00 - 0.20 k/uL    Absolute Immature Granulocyte 0.44 (H) 0.00 - 0.30 k/uL   Basic Metabolic Panel w/ Reflex to MG    Collection Time: 08/06/20  1:15 PM Result Value Ref Range    Glucose 252 (H) 70 - 99 mg/dL    BUN 83 (H) 8 - 23 mg/dL    CREATININE 2.59 (H) 0.50 - 0.90 mg/dL    Bun/Cre Ratio NOT REPORTED 9 - 20    Calcium 8.3 (L) 8.6 - 10.4 mg/dL    Sodium 133 (L) 135 - 144 mmol/L    Potassium 4.9 3.7 - 5.3 mmol/L    Chloride 101 98 - 107 mmol/L    CO2 17 (L) 20 - 31 mmol/L    Anion Gap 15 9 - 17 mmol/L    GFR Non-African American 17 (L) >60 mL/min    GFR  21 (L) >60 mL/min    GFR Comment          GFR Staging NOT REPORTED    Troponin    Collection Time: 08/06/20  1:15 PM   Result Value Ref Range    Troponin, High Sensitivity 69 (HH) 0 - 14 ng/L    Troponin T NOT REPORTED <0.03 ng/mL    Troponin Interp NOT REPORTED    Brain Natriuretic Peptide    Collection Time: 08/06/20  1:15 PM   Result Value Ref Range    Pro-BNP 1,442 (H) <300 pg/mL    BNP Interpretation Pro-BNP Reference Range:    Protime-INR    Collection Time: 08/06/20  1:15 PM   Result Value Ref Range    Protime 10.7 9.0 - 12.0 sec    INR 1.0    APTT    Collection Time: 08/06/20  1:15 PM   Result Value Ref Range    PTT 21.2 20.5 - 30.5 sec   Lactic Acid, Plasma    Collection Time: 08/06/20  1:15 PM   Result Value Ref Range    Lactic Acid NOT REPORTED mmol/L    Lactic Acid, Whole Blood 5.2 (H) 0.7 - 2.1 mmol/L   CK    Collection Time: 08/06/20  1:15 PM   Result Value Ref Range    Total  (H) 26 - 192 U/L   Immature Platelet Fraction    Collection Time: 08/06/20  1:15 PM   Result Value Ref Range    Platelet, Fluorescence Platelet clumps present, count appears adequate.  138 - 453 k/uL   Hepatic Function Panel    Collection Time: 08/06/20  1:15 PM   Result Value Ref Range    Alb 2.5 (L) 3.5 - 5.2 g/dL    Alkaline Phosphatase 120 (H) 35 - 104 U/L    ALT 33 5 - 33 U/L    AST 45 (H) <32 U/L    Total Bilirubin 0.68 0.3 - 1.2 mg/dL    Bilirubin, Direct 0.30 <0.31 mg/dL    Bilirubin, Indirect 0.38 0.00 - 1.00 mg/dL    Total Protein 4.4 (L) 6.4 - 8.3 g/dL    Globulin NOT REPORTED 1.5 - 3.8 g/dL Anion Gap 12 7 - 16 mmol/L   Urinalysis Reflex to Culture    Collection Time: 08/06/20  2:56 PM    Specimen: Urine, clean catch   Result Value Ref Range    Color, UA YELLOW YELLOW    Turbidity UA CLEAR CLEAR    Glucose, Ur NEGATIVE NEGATIVE    Bilirubin Urine NEGATIVE NEGATIVE    Ketones, Urine NEGATIVE NEGATIVE    Specific Gravity, UA 1.008 1.005 - 1.030    Urine Hgb SMALL (A) NEGATIVE    pH, UA 6.0 5.0 - 8.0    Protein, UA NEGATIVE NEGATIVE    Urobilinogen, Urine Normal Normal    Nitrite, Urine NEGATIVE NEGATIVE    Leukocyte Esterase, Urine NEGATIVE NEGATIVE    Urinalysis Comments NOT REPORTED    Microscopic Urinalysis    Collection Time: 08/06/20  2:56 PM   Result Value Ref Range    -          WBC, UA None 0 - 5 /HPF    RBC, UA None 0 - 4 /HPF    Casts UA  0 - 8 /LPF     2 TO 5 HYALINE Reference range defined for non-centrifuged specimen. Crystals, UA NOT REPORTED None /HPF    Epithelial Cells UA 0 TO 2 0 - 5 /HPF    Renal Epithelial, UA NOT REPORTED 0 /HPF    Bacteria, UA NOT REPORTED None    Mucus, UA NOT REPORTED None    Trichomonas, UA NOT REPORTED None    Amorphous, UA NOT REPORTED None    Other Observations UA NOT REPORTED NOT REQ. Yeast, UA NOT REPORTED None   COVID-19    Collection Time: 08/06/20  3:08 PM    Specimen: Other   Result Value Ref Range    SARS-CoV-2          SARS-CoV-2, Rapid DETECTED (A) Not Detected    Source . NASOPHARYNGEAL SWAB     SARS-CoV-2, PCR         Lactate, Sepsis    Collection Time: 08/06/20  3:12 PM   Result Value Ref Range    Lactic Acid, Sepsis NOT REPORTED 0.5 - 1.9 mmol/L    Lactic Acid, Sepsis, Whole Blood 3.0 (H) 0.5 - 1.9 mmol/L   Troponin    Collection Time: 08/06/20  3:17 PM   Result Value Ref Range    Troponin, High Sensitivity 81 (HH) 0 - 14 ng/L    Troponin T NOT REPORTED <0.03 ng/mL    Troponin Interp NOT REPORTED    EKG 12 Lead    Collection Time: 08/06/20  4:33 PM   Result Value Ref Range    Ventricular Rate 123 BPM    Atrial Rate 71 BPM    QRS Duration 70 ms    Q-T Interval 400 ms    QTc Calculation (Bazett) 572 ms    P Axis 76 degrees    R Axis 52 degrees    T Axis 81 degrees       Imaging/Diagnostics:  Xr Knee Left (3 Views)  Result Date: 8/6/2020  No fracture dislocation left knee s/p TKR. Ct Head Wo Contrast  Result Date: 8/6/2020  No acute intracranial abnormality. Chronic appearing paranasal sinus disease. Bilateral mastoiditis. Ct Cervical Spine Wo Contrast  Result Date: 8/6/2020  No acute abnormality of the cervical spine. Probable old compression deformity T4; correlate clinically. Osteopenia, multilevel degenerative changes and additional findings, as described in detail above. Ct Thoracic Spine Wo Contrast  Result Date: 8/6/2020  Chronic appearing moderate-severe compression deformity T4. An acute element should be considered if there is localized tenderness at this level. No additional thoracic spine fracture identified. Mild-moderate multilevel degenerative and additional findings, as above RECOMMENDATIONS: MRI could be obtained to assess for possible edema, if indicated clinically. Ct Lumbar Spine Wo Contrast  Result Date: 8/6/2020  No acute lumbar spine fracture. Multilevel degenerative changes/DDD, as described. Xr Chest Portable  Result Date: 8/6/2020  Patchy airspace and interstitial findings, better demonstrated on CT. Correlate for edema versus pneumonia. Ct Chest Abdomen Pelvis Wo Contrast  Result Date: 8/6/2020  1. Patchy ground-glass opacification within the lungs, predominantly peribronchial in distribution, possibly mild edema or infiltrate. 2. Atherosclerotic calcification in the aorta and coronary circulation. No aneurysm. 3. Uncomplicated diverticulitis involving the proximal sigmoid colon. No evidence of perforation or abscess. No free fluid. 4. 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 5. T5 compression fracture. See CT of the thoracic and lumbar spine for complete details.        Assessment : Hospital Problems           Last Modified POA    * (Principal) COVID-19 with multiple comorbidities 8/6/2020 Yes          Plan:     Patient status inpatient in the Medical ICU    1. Clarify home meds  2. Continue gentle hydration  3. Trend troponins and lactic acid  4. TSH, BNP, Troponin, Lactic acid, CBC, CMP, A1c in am  5. LD, Ferritin, PTT, D Dimer, Fibrinogen  6. Heparin for DVT prophylaxis  7. Pepcid for GI prophylaxis  8. Sliding scale insulin ac & hs  9. Consult ID  10. Verify code status with ECF    Consultations:   IP CONSULT TO HOSPITALIST  IP CONSULT TO INFECTIOUS DISEASES    Patient is admitted as inpatient status because of co-morbidities listed above, severity of signs and symptoms as outlined, requirement for current medical therapies and most importantly because of direct risk to patient if care not provided in a hospital setting. Expected length of stay > 48 hours.     Amanda Timmons, JORGE - CNS  8/6/2020  4:38 PM    Copy sent to Dr. Cielo Scott MD

## 2020-08-06 NOTE — PROGRESS NOTES
PHARMACY NOTE:    The electrolyte replacement protocol for potassium/magnesium has been discontinued per P&T guidelines because the patient has reduced renal function (CrCl < 30 mL/min). The patient's most recent potassium & magnesium levels are:  Recent Labs     08/06/20  1315   K 4.9     Estimated Creatinine Clearance: 10 mL/min (A) (based on SCr of 3.02 mg/dL (H)). For patients with decreased renal function (below 30ml/min) needing potassium/magnesium supplementation, please order individual bolus doses with appropriate monitoring. Please contact the inpatient pharmacy with any concerns. Thank you.   Elmer Curtis [facility].8/6/2020  6:20 PM

## 2020-08-07 PROBLEM — R65.21 SEPTIC SHOCK (HCC): Status: ACTIVE | Noted: 2020-01-01

## 2020-08-07 PROBLEM — K57.92 DIVERTICULITIS: Status: ACTIVE | Noted: 2020-01-01

## 2020-08-07 PROBLEM — A41.9 SEPTIC SHOCK (HCC): Status: ACTIVE | Noted: 2020-01-01

## 2020-08-07 NOTE — PROGRESS NOTES
Physical Therapy    Facility/Department: [facility]  Initial Assessment    NAME: Leighton Yen  : 10/4/1923  MRN: [id number]  Chief Complaint   Patient presents with    Loss of Consciousness     had syncopal episode on bathroom, incontinent of bowels. neck pain. low bp, dopamine started by ems. shut off by arrival     Date of Service: 2020    Discharge Recommendations: Further therapy recommended at discharge. PT Equipment Recommendations  Equipment Needed: No    Assessment   Body structures, Functions, Activity limitations: Decreased functional mobility ; Decreased balance;Decreased endurance;Decreased safe awareness;Decreased ROM; Decreased strength  Assessment: The pt performed bed mobility with Min-Mod A and stood with CGA, unable to ambulate due to endurance deficits. Recommend continued PT to maximize safety and progress toward prior level of independence. Prognosis: Good  Decision Making: Medium Complexity  PT Education: Goals;PT Role;Plan of Care;Transfer Training;Functional Mobility Training;General Safety  Barriers to Learning: [place]  REQUIRES PT FOLLOW UP: Yes  Activity Tolerance  Activity Tolerance: Patient limited by endurance       Patient Diagnosis(es): The primary encounter diagnosis was COVID-19. Diagnoses of PRAFUL (acute kidney injury) ([address] Atrial fibrillation, unspecified type ([address] and Elevated troponin were also pertinent to this visit. has a past medical history of Chronic kidney disease, Constipated, Diabetes mellitus ([address] Falls, Heart failure ([address] [place] (hard of hearing), Hypertension, Hypothyroid, Muscle wasting, Muscle weakness, Pneumonia, and Sarcopenia. has no past surgical history on file.     Restrictions  Restrictions/Precautions  Restrictions/Precautions: Fall Risk, Isolation(COVID+)  Required Braces or Orthoses?: No  Position Activity Restriction  Other position/activity restrictions: up with assist  Vision/Hearing  Vision: Within Functional Limits  Hearing: Exceptions to [facility]  Hearing Exceptions: Hard of hearing/hearing concerns     Subjective  General  Patient assessed for rehabilitation services?: Yes  Response To Previous Treatment: Not applicable  Family / Caregiver Present: No  Follows Commands: Within Functional Limits  Subjective  Subjective: RN and pt agreeable to PT. Pt supine in bed upon arrival, cooperative throughout. Pain Screening  Patient Currently in Pain: Yes  Pain Assessment  Pain Assessment: 0-10(unable to rate, c/o generalized pain)  Pain Location: Generalized  Non-Pharmaceutical Pain Intervention(s): Ambulation/Increased Activity; Emotional support  Response to Pain Intervention: Patient Satisfied  Vital Signs  Patient Currently in Pain: Yes       Orientation  Orientation  Overall Orientation Status: Impaired  Orientation Level: Disoriented to situation  Social/Functional History  Social/Functional History  Type of Home: Facility  Home Layout: One level  Home Access: Level entry  Home Equipment: Rolling walker  ADL Assistance: Needs assistance  Homemaking Assistance: Needs assistance  Homemaking Responsibilities: No  Ambulation Assistance: Independent(with RW)  Transfer Assistance: Independent  Active : No  Cognition   Cognition  Overall Cognitive Status: Exceptions  Following Commands:  Follows one step commands with repetition  Attention Span: Difficulty dividing attention  Problem Solving: Assistance required to generate solutions  Insights: Decreased awareness of deficits  Initiation: Requires cues for some  Sequencing: Requires cues for some    Objective          Joint Mobility  Spine: WFL  ROM RLE: WFL  ROM LLE: WFL  ROM RUE: AROM shoulder flexion ~30 degrees  ROM LUE: AROM shoulder flexion ~90 degrees  Strength RLE  Strength RLE: WFL  Strength LLE  Strength LLE: WFL  Strength RUE  Strength RUE: WFL  Comment: except shoulder flexion 3-/5  Strength LUE  Strength LUE: WFL  Comment: within available ROM at shoulder  Tone RLE  RLE Tone: Normotonic  Tone LLE  LLE Tone: Normotonic  Motor Control  Gross Motor?: WFL  Sensation  Overall Sensation Status: WFL  Bed mobility  Rolling to Left: Minimal assistance  Rolling to Right: Minimal assistance  Supine to Sit: Minimal assistance(for trunk progression)  Sit to Supine: Moderate assistance(for BLE progression)  Scooting: Moderate assistance  Comment: Increased time and effort required, HOB elevated ~30 degrees, use of bed rails  Transfers  Sit to Stand: Contact guard assistance  Stand to sit: Contact guard assistance  Comment: Transfers performed with RW.  Pt utilized BUE on RW with therapist stabilizing  Ambulation  Ambulation?: No(due to poor standing tolerance and pt requesting to sit)     Balance  Posture: Poor  Sitting - Static: Fair  Sitting - Dynamic: Fair;-  Standing - Static: Fair  Comments: standing balance assessed with RW, standing tolerance ~10 seconds; pt required Min A to maintain sitting EOB during LE MMT        Plan   Plan  Times per week: 3-5x/wk  Current Treatment Recommendations: Strengthening, ROM, Balance Training, Functional Mobility Training, Transfer Training, Gait Training, Endurance Training, Home Exercise Program, Safety Education & Training, Patient/Caregiver Education & Training  Safety Devices  Type of devices: Nurse notified, Call light within reach, Gait belt, Left in bed, Bed alarm in place  Restraints  Initially in place: No    AM-PAC Score  AM-PAC Inpatient Mobility Raw Score : 14 (08/07/20 1531)  AM-PAC Inpatient T-Scale Score : 38.1 (08/07/20 1531)  Mobility Inpatient CMS 0-100% Score: 61.29 (08/07/20 1531)  Mobility Inpatient CMS G-Code Modifier : CL (08/07/20 1531)          Goals  Short term goals  Time Frame for Short term goals: 14 visits  Short term goal 1: Perform bed mobility and functional transfers with SBA  Short term goal 2: Ambulate 200ft with RW and SBA  Short term goal 3: Demo Fair+ dynamic standing balance to decrease risk of falls  Short term goal 4: Participate in 27 minutes of therapy to demo increased endurance       Therapy Time   Individual Concurrent Group Co-treatment   Time In 1412         Time Out 1445         Minutes 33         Timed Code Treatment Minutes: 21 Minutes       Luis Enrique Frost, PT

## 2020-08-07 NOTE — CARE COORDINATION
Case Management Initial Discharge Plan  Ig[place]ia [place],             Met with:Called daughter to discuss discharge plans. Information verified: address, contacts, phone number, , insurance Yes    Emergency Contact/Next of Kin name & number: Terri Molina [phone]    PCP: Miroslava Cortez MD  Date of last visit: monthly at the facility    Insurance Provider: Medicare    Discharge Planning    Living Arrangements:  Other (Comment)(LTC)   Support Systems:  [id number] has 1 stories  0 stairs to climb to get into front door, n/a stairs to climb to reach second floor  Location of bedroom/bathroom in home resides at lt at [facility]    Patient able to perform ADL's:Assisted    Current Services (outpatient & in home) Long term care at [facility] at [facility]  DME equipment: walker  DME provider: personal    Receiving oral anticoagulation therapy? Yes    If indicated:   Physician managing anticoagulation treatment: none  Where does patient obtain lab work for ATC treatment? n/a      Potential Assistance Needed:  [facility]    Patient agreeable to home care: No  [place] of choice provided:  n/a    Prior SNF/Rehab Placement and Facility: [facility] Food to SNF/Rehab: Yes  [place] of choice provided: yes     Evaluation: n/a    Expected Discharge date:       Patient expects to be discharged to: Follow Up Appointment: Best Day/ Time:      Transportation provider: mark  Transportation arrangements needed for discharge: Yes    Readmission Risk              Risk of Unplanned Readmission:        13             Does patient have a readmission risk score greater than 14?: No  If yes, follow-up appointment must be made within 7 days of discharge. Goals of Care: improve respiratory symptoms      Discharge Plan: return to [facility].           Electronically signed by Chanelle Shepherd RN on 20 at 12:17 PM EDT

## 2020-08-07 NOTE — PLAN OF CARE
Nutrition Problem #1: Increased nutrient needs  Intervention: Food and/or Nutrient Delivery: Continue Current Diet, Start Oral Nutrition Supplement  Nutritional Goals: Pt to consume >75% of est'd needs via PO

## 2020-08-07 NOTE — RESEARCH
Clinical Research Services      Patient Name: Lindy Teresa  MRN: [id number]  YOB: 1923  Date of evaluation: 8/7/2020  Time of evaluation: 1500  Reason for evaluation:  Screening    Asked by Dr. Richard Yusuf to evaluate the patient for Expanded Access to [address] [place] [address] Plasma for the Treatment of Patients with COVID-19     Protocol # [id number]     IND# [id number]       NCT# [id number]  :  Carolina Bartholomew MD through the [facility]    [x]  Patient met eligibility criteria  [x]  Patient read study consent. Questions answered by Dr. Zander Marino. Consent signed by patient. [x]  A copy of the signed consents given to the patient. [x]  Blood bank notified  [x]  Patient educated on plasma indication and side effects. [x]  Patient verbalizes understanding. [x]  The patient will receive 2 units of ABO compatible COVID-19 convalescent plasma over 1-2 hours.      Additional Comments:      Evita Valle RN      Clinical Research Nurse  For questions page or perfect serve Dr. Jessica Mahajan, Dr. Mary Carmen Howe, Dr. Zander Marino, Dr. Alicia Anthony , Dr. Aide Abarca, or Dr. Bryan Skinner or the research nurse at [phone]

## 2020-08-07 NOTE — CARE COORDINATION
SBIRT deferred d/t +Covid19            Alcohol Screening and Brief Intervention          Deferred [x]    Completed on: 8/7/2020   Susanne Hamilton, ALETHA, LSW

## 2020-08-07 NOTE — CONSULTS
Attestation signed by      Attending Physician Statement:    I have discussed the care of  Gonsalo Foy , including pertinent history and exam findings, with the Cardiology fellow/resident. I have seen and examined the patient and the key elements of all parts of the encounter have been performed by me. I agree with the assessment, plan and orders as documented by the fellow/resident, after I modified exam findings and plan of treatments, and the final version is my approved version of the assessment. Additional Comments:   #NSTEMI type 2- due to COVID 19 and PRAFUL/CKD- no trend to suggest ongoing ischemia/injury. #COVID 19  #Sepsis  - plan for Echo when improved- can be done as outpatient. - treat COVID   - can follow as outpatient    Discussed with patient and nursing. Thank you for allowing me to participate in the care of this patient, please do not hesitate to call if you have any questions. Kellen Li DO, [facility] [place], [address] [facility]  [employer]  [phone]     [facility]   Consultation Note               Today's Date: 8/7/2020  Patient Name: Gonsalo Foy  Date of admission: 8/6/2020 12:15 PM  Patient's age: 80 y. o., 10/4/1923  Admission Dx: [id number] with multiple comorbidities [U07.1]    Reason for Consult:  Troponin elevation    Requesting Physician: Dami Doran DO    CHIEF COMPLAINT:    Chief Complaint   Patient presents with    Loss of Consciousness     had syncopal episode on bathroom, incontinent of bowels. neck pain. low bp, dopamine started by ems. shut off by arrival       History Obtained From:  patient, electronic medical record    HISTORY OF PRESENT ILLNESS:      The patient is a 80 y.o.  female who was admitted to the hospital after reportedly losing consciousness and was found in her bathroom. She was presumed to have a syncopal episode. She was subsequently alert and oriented, however, did not recall the event.  Started on dopamine for bradycardia but turned off by the time patient reached ED. In the ED she was not to have hypotension, elevated lactate and mildly elevated troponins. She also tested positive for COVID-19. Past Medical History:   has a past medical history of Chronic kidney disease, Constipated, Diabetes mellitus ([address] Falls, Heart failure ([facility] [address] [place] (hard of hearing), Hypertension, Hypothyroid, Muscle wasting, Muscle weakness, Pneumonia, and Sarcopenia. Past Surgical History:   has no past surgical history on file. Home Medications:    Prior to Admission medications    Not on File      Current Facility-Administered Medications: norepinephrine (LEVOPHED) 16 mg in sodium chloride 0.9 % 250 mL infusion, 2 mcg/min, Intravenous, Continuous  dextrose 50 % IV solution, 12.5 g, Intravenous, PRN  dextrose 5 % solution, 100 mL/hr, Intravenous, PRN  [START ON 8/8/2020] famotidine (PEPCID) injection 20 mg, 20 mg, Intravenous, Daily  sodium chloride flush 0.9 % injection 10 mL, 10 mL, Intravenous, 2 times per day  sodium chloride flush 0.9 % injection 10 mL, 10 mL, Intravenous, PRN  acetaminophen (TYLENOL) tablet 650 mg, 650 mg, Oral, Q6H PRN **OR** acetaminophen (TYLENOL) suppository 650 mg, 650 mg, Rectal, Q6H PRN  polyethylene glycol (GLYCOLAX) packet 17 g, 17 g, Oral, Daily PRN  promethazine (PHENERGAN) tablet 12.5 mg, 12.5 mg, Oral, Q6H PRN **OR** ondansetron (ZOFRAN) injection 4 mg, 4 mg, Intravenous, Q6H PRN  heparin (porcine) injection 5,000 Units, 5,000 Units, Subcutaneous, 3 times per day  0.9 % sodium chloride infusion, , Intravenous, Continuous  insulin lispro (HUMALOG) injection vial 0-12 Units, 0-12 Units, Subcutaneous, TID WC  insulin lispro (HUMALOG) injection vial 0-6 Units, 0-6 Units, Subcutaneous, Nightly      Allergies:  Patient has no known allergies. Social History:   reports that she has never smoked. She has never used smokeless tobacco. She reports previous alcohol use.  She reports previous drug use. Family History: family history is not on file. No h/o sudden cardiac death. No for premature CAD      REVIEW OF SYSTEMS:    · Constitutional: there has been no unanticipated weight loss. · Eyes: No visual changes or diplopia. · ENT: No Headaches  · Cardiovascular: see above  · Respiratory: No previous pulmonary problems, No cough  · Gastrointestinal: No abdominal pain. No change in bowel or bladder habits. · Genitourinary: No dysuria, trouble voiding, or hematuria. · Musculoskeletal:  No gait disturbance, No weakness or joint complaints. · Integumentary: No rash or pruritis. · Neurological: No headache, diplopia      PHYSICAL EXAM:      BP (!) 113/48   Pulse 70   Temp 97.2 °F (36.2 °C) (Bladder)   Resp 23   Ht 5' 3\" (1.6 m)   Wt 148 lb 9.4 oz (67.4 kg)   SpO2 96%   BMI 26.32 kg/m²    · Physical exam deferred given COVID positive status. Please refer to primary teams exam.       DATA:    Diagnostics:    Labs:     CBC:   Recent Labs     08/06/20  1315   WBC 10.7   HGB 10.2*   HCT 31.3*   PLT See Reflexed IPF Result     BMP:   Recent Labs     08/06/20  1851 08/07/20  0522   * 142   K 4.9 4.3   CO2 20 18*   BUN 82* 74*   CREATININE 2.59* 2.11*   LABGLOM 17* 22*   GLUCOSE 337* 40*     BNP: No results for input(s): BNP in the last 72 hours.   PT/INR:   Recent Labs     08/06/20  1315 08/06/20  1850   PROTIME 10.7 10.7   INR 1.0 1.0     APTT:  Recent Labs     08/06/20  1315 08/06/20  1850   APTT 21.2 23.2     CARDIAC ENZYMES:  Recent Labs     08/06/20  1517 08/06/20  2225 08/07/20  0522   TROPHS 81* 82* 70*     Recent Labs     08/06/20  1315   CKTOTAL 194*     Recent Labs     08/06/20  1517 08/06/20  2225 08/07/20  0522   TROPONINT NOT REPORTED NOT REPORTED NOT REPORTED     FASTING LIPID PANEL:No results found for: HDL, LDLDIRECT, LDLCALC, TRIG  LIVER PROFILE:  Recent Labs     08/06/20  1851 08/07/20  0522   AST 64* 73*   ALT 43* 47*   LABALBU 2.7* 2.9*       EKG: likely sinus, prolonged qtc      IMPRESSION:    1. Syncope  2. Abnormal ekg  3. COVID positive  4. NSTEMI - likely demand, troponin 69>82>70  5. PRAFUL on CKD, Cr 2.59>2.11, baseline 1.3  6. Prolonged Qtc  7. Possible sepsis/UTI  8. HTN  9. Possible hx of afib - currently in sinus        RECOMMENDATIONS:  1. Off pressors. Stop trending troponin. 2. Start baby aspirin  3. No acute invasive intervention planned. 4. Avoid qt prolonging medications. K>4 and Mg>2  5. Will get formal echo when improves, unless urgent/hemodynamically unstable      Thank you for allowing us to participate in S[facility] care. Will follow with you.       Electronically signed on 08/07/20 at 1:00 PM by:    Emily Singh MD   Fellow, [address] [facility]

## 2020-08-07 NOTE — PLAN OF CARE
Problem: Airway Clearance - Ineffective  Goal: Achieve or maintain patent airway  Outcome: Ongoing     Problem: Gas Exchange - Impaired  Goal: Absence of hypoxia  Outcome: Ongoing  Goal: Promote optimal lung function  Outcome: Ongoing     Problem: Breathing Pattern - Ineffective  Goal: Ability to achieve and maintain a regular respiratory rate  Outcome: Ongoing     Problem: Body Temperature -  Risk of, Imbalanced  Goal: Ability to maintain a body temperature within defined limits  Outcome: Ongoing  Goal: Will regain or maintain usual level of consciousness  Outcome: Ongoing  Goal: Complications related to the disease process, condition or treatment will be avoided or minimized  Outcome: Ongoing     Problem: Isolation Precautions - Risk of Spread of Infection  Goal: Prevent transmission of infection  Outcome: Ongoing     Problem: Nutrition Deficits  Goal: Optimize nutrtional status  Outcome: Ongoing     Problem: Risk for Fluid Volume Deficit  Goal: Maintain normal heart rhythm  Outcome: Ongoing  Goal: Maintain absence of muscle cramping  Outcome: Ongoing  Goal: Maintain normal serum potassium, sodium, calcium, phosphorus, and pH  Outcome: Ongoing     Problem: Loneliness or Risk for Loneliness  Goal: Demonstrate positive use of time alone when socialization is not possible  Outcome: Ongoing     Problem: Fatigue  Goal: Verbalize increase energy and improved vitality  Outcome: Ongoing     Problem: Patient Education: Go to Patient Education Activity  Goal: Patient/Family Education  Outcome: Ongoing     Problem: Pain:  Goal: Pain level will decrease  Description: Pain level will decrease  Outcome: Ongoing  Goal: Control of acute pain  Description: Control of acute pain  Outcome: Ongoing  Goal: Control of chronic pain  Description: Control of chronic pain  Outcome: Ongoing     Problem:  Bowel Function - Altered:  Goal: Bowel elimination is within specified parameters  Description: Bowel elimination is within specified parameters  Outcome: Ongoing     Problem: Fluid Volume - Imbalance:  Goal: Absence of imbalanced fluid volume signs and symptoms  Description: Absence of imbalanced fluid volume signs and symptoms  Outcome: Ongoing     Problem: Gas Exchange - Impaired:  Goal: Levels of oxygenation will improve  Description: Levels of oxygenation will improve  Outcome: Ongoing     Problem: Nutrition Deficit:  Goal: Ability to achieve adequate nutritional intake will improve  Description: Ability to achieve adequate nutritional intake will improve  Outcome: Ongoing     Problem: Skin Integrity - Impaired:  Goal: Will show no infection signs and symptoms  Description: Will show no infection signs and symptoms  Outcome: Ongoing  Goal: Absence of new skin breakdown  Description: Absence of new skin breakdown  Outcome: Ongoing     Problem: Tissue Perfusion - Cardiopulmonary, Altered:  Goal: Absence of angina  Description: Absence of angina  Outcome: Ongoing  Goal: Hemodynamic stability will improve  Description: Hemodynamic stability will improve  Outcome: Ongoing     Electronically signed by Katy Olmos RN on 8/7/2020 at 3:22 AM

## 2020-08-07 NOTE — FLOWSHEET NOTE
8/6 2347 RN notified Beatrice Tarango NP via secure message pts recent BP 97/49 (57), which has been around her normal since admit. Notified of HR, UO, temp and resp WNL after 2.5L fluid today and most recent lactic/hgb results. 8/7 0021 Dr Duggan Payment notified of pts hypotension and fluid intake today, including VS and lactic results; See orders. OK to start Levo if pts hypotension persists post 1L bolus.      Electronically signed by Katy Olmos RN on 8/7/2020 at 12:28 AM

## 2020-08-07 NOTE — CONSULTS
and family history, and I have updated the database accordingly. Past Medical History:     Past Medical History:   Diagnosis Date    Chronic kidney disease     Constipated     Diabetes mellitus ([facility] [address]     Falls     Heart failure (HCC)     [place] (hard of hearing)     Hypertension     Hypothyroid     Muscle wasting     Muscle weakness     Pneumonia     Sarcopenia      Past Surgical  History:   History reviewed. No pertinent surgical history. Medications:      [START ON 8/8/2020] famotidine (PEPCID) injection  20 mg Intravenous Daily    sodium chloride flush  10 mL Intravenous 2 times per day    heparin (porcine)  5,000 Units Subcutaneous 3 times per day    insulin lispro  0-12 Units Subcutaneous TID WC    insulin lispro  0-6 Units Subcutaneous Nightly     Social History:     Social History     Socioeconomic History    Marital status:       Spouse name: Not on file    Number of children: Not on file    Years of education: Not on file    Highest education level: Not on file   Occupational History    Not on file   Social Needs    Financial resource strain: Not on file    Food insecurity     Worry: Not on file     Inability: Not on file    Transportation needs     Medical: Not on file     Non-medical: Not on file   Tobacco Use    Smoking status: Never Smoker    Smokeless tobacco: Never Used   Substance and Sexual Activity    Alcohol use: Not Currently    Drug use: Not Currently    Sexual activity: Not Currently   Lifestyle    Physical activity     Days per week: Not on file     Minutes per session: Not on file    Stress: Not on file   Relationships    Social connections     Talks on phone: Not on file     Gets together: Not on file     Attends [employer] service: Not on file     Active member of club or organization: Not on file     Attends meetings of clubs or organizations: Not on file     Relationship status: Not on file    Intimate partner violence     Fear of current or ex 08/06/20  1315 08/06/20  1851 08/07/20  0522   PROT 4.4* 4.9* 5.3*   LABALBU 2.5* 2.7* 2.9*   BILIDIR 0.30  --   --    IBILI 0.38  --   --    BILITOT 0.68 0.62 0.65   ALKPHOS 120* 144* 138*   ALT 33 43* 47*   AST 45* 64* 73*     No results found for: CRP  No results found for: SEDRATE    No results for input(s): PROCAL in the last 72 hours.      Troponin, High Sensitivity  70High Panic    0 - 14 ng/L  Final  08/07/2020  5:22 AM  [address]      Pro-BNP  2,521High    <300 pg/mL  Final  08/07/2020  5:22 AM  [address]      Lactic Acid, Whole Blood  1.2  0.7 - 2.1 mmol/L  Final  08/07/2020  5:00 AM  [address]      D-Dimer, Quant  3.53  mg/L FEU  Final  08/06/2020 10:26 PM  [address]     [place]  630ETTZ    135 - 214 U/L  Final  08/06/2020  6:51 PM  [address]      Ferritin  542High    13 - 150 ug/L  Final  08/06/2020  6:51 PM  [address]      SARS-CoV-2         Final  08/06/2020  3:08 PM  [address]    SARS-CoV-2, Rapid  DETECTEDAbnormal    Not Detected  Final  08/06/2020  3:08 PM  [address]      [place]  YELLOW  YELLOW  Final  08/06/2020  2:56 PM  [address]    Turbidity UA  CLEAR  CLEAR  Final  08/06/2020  2:56 PM  [address]    Glucose, Ur  NEGATIVE  NEGATIVE  Final  08/06/2020  2:56 PM  [address]    Bilirubin Urine  NEGATIVE  NEGATIVE  Final  08/06/2020  2:56 PM  [address]    Ketones, Urine  NEGATIVE  NEGATIVE  Final  08/06/2020  2:56 PM  [address]    Specific [place], UA  1.008  1.005 - 1.030  Final  08/06/2020  2:56 PM  [address]    Urine Hgb  SMALLAbnormal    NEGATIVE  Final  08/06/2020  2:56 PM  [address]    pH, UA  6.0  5.0 - 8.0  Final  08/06/2020  2:56 PM  [address]  NEGATIVE  NEGATIVE  Final  08/06/2020  2:56 PM  [place] [address], Urine  Normal  Normal  Final  08/06/2020  2:56 PM  [facility]    Nitrite, Urine  NEGATIVE  NEGATIVE  Final  08/06/2020  2:56 PM  [facility]    Leukocyte Esterase, Urine  NEGATIVE  NEGATIVE  Final  08/06/2020  2:56 PM  [facility]    Urinalysis Comments  NOT REPORTED   Final  08/06/2020  2:56 PM  [facility]        WBC, UA  None  0 - 5 /HPF  Final  08/06/2020  2:56 PM  [facility]    RBC, UA  None  0 - 4 /HPF  Final  08/06/2020  2:56 PM  [facility]        Imaging Studies:   THREE XRAY VIEWS OF THE LEFT KNEE 8/6/2020 2:22 pm  FINDINGS:   No fracture dislocation left knee s/p TKR. CT OF THE HEAD WITHOUT CONTRAST  8/6/2020 1:44 pm  FINDINGS:   No acute intracranial abnormality. Chronic appearing paranasal sinus disease. Bilateral mastoiditis. CT OF THE CERVICAL SPINE WITHOUT CONTRAST 8/6/2020 1:44 pm   FINDINGS:   No acute abnormality of the cervical spine. Probable old compression deformity T4; correlate clinically. Osteopenia, multilevel degenerative changes and additional findings, as described in detail above. CT OF THE THORACIC SPINE WITHOUT CONTRAST  8/6/2020 1:44 pm:   FINDINGS:   Chronic appearing moderate-severe compression deformity T4. An acute element should be considered if there is localized tenderness at this level. No additional thoracic spine fracture identified. Mild-moderate multilevel degenerative and additional findings, as above RECOMMENDATIONS: MRI could be obtained to assess for possible edema, if indicated clinically. CT OF THE LUMBAR SPINE WITHOUT CONTRAST  8/6/2020  FINDINGS:   No acute lumbar spine fracture. Multilevel degenerative changes/DDD, as described. ONE XRAY VIEW OF THE CHEST 8/6/2020 2:22 pm   FINDINGS:   Patchy airspace and interstitial findings, better demonstrated on CT.  Correlate for edema versus

## 2020-08-07 NOTE — PROGRESS NOTES
Gill Tanner 19    Progress Note    8/7/2020    6:43 PM    Name:   Dusty Begum  MRN:     [id number]     Acct:      [de-identified]   Room:   [facility][note 3][address] Day:  1  Admit Date:  8/6/2020 12:15 PM    PCP:   René Oviedo MD  Code Status:  Full Code    Subjective:     C/C:   Chief Complaint   Patient presents with    Loss of Consciousness     had syncopal episode on bathroom, incontinent of bowels. neck pain. low bp, dopamine started by ems. shut off by arrival     Interval History Status: improved. Pt seen and examined this morning. Admitted yesterday after a syncopal episode at her ECF. Briefly on dopamine. Currently on levophed. She feels well, however. Discussed with the patient's daughter this evening. Patient recently admitted at [facility] two weeks ago for pneumonia. Transferred back to [facility] and in isolation. Today was supposed to be the day she was let out of isolation. Brief History:     Per my associate yesterday      Dusty Begum is a 80 y.o. Declined female who presents with Loss of Consciousness (had syncopal episode on bathroom, incontinent of bowels. neck pain. low bp, dopamine started by ems. shut off by arrival)   and is admitted to the hospital for the management of COVID-19 with multiple comorbidities. She has a hx of CHF and CKD.       Pt resides in a nursing home. She was found between the bathroom wall and [facility]. This was a presumed syncopal event. She does not know what happened and her only complaints are of neck pain, knee pain and suprapubic discomfort. In the ED she was found to be hypotensive and required a rt femoral central line. Lactic acid was elevated as was BNP, troponin and BUN/Creat. In addition her Covid was + but she was not hypoxic. CT chest showed ground glass opacities in the peribronchial region. CT of C-T-L spine revealed old T4 compression fx. Left knee films were neg. Resp 24   Ht 5' 3\" (1.6 m)   Wt 148 lb 9.4 oz (67.4 kg)   SpO2 100%   BMI 26.32 kg/m²   Temp (24hrs), Av.5 °F (36.9 °C), Min:97.2 °F (36.2 °C), Max:99.5 °F (37.5 °C)    Recent Labs     20  0709 20  0924 20  1333 20  1743   POCGLU 124* 120* 202* 90       I/O (24Hr):     Intake/Output Summary (Last 24 hours) at 2020 1843  Last data filed at 2020 1820  Gross per 24 hour   Intake 3023.05 ml   Output 1590 ml   Net 1433.05 ml       Labs:  Hematology:  Recent Labs     20  1315 20  18520  2226   WBC 10.7  --   --    RBC 3.08*  --   --    HGB 10.2*  --   --    HCT 31.3*  --   --    .6  --   --    MCH 33.1  --   --    MCHC 32.6  --   --    RDW 13.4  --   --    PLT See Reflexed IPF Result  --   --    MPV NOT REPORTED  --   --    INR 1.0 1.0  --    DDIMER  --  4.56 3.53     Chemistry:  Recent Labs     20  1315 20  1321 20  1517 20  1851 20  2225 20  0500 20  0522   *  --   --  134*  --   --  142   K 4.9  --   --  4.9  --   --  4.3     --   --  100  --   --  105   CO2 17*  --   --  20  --   --  18*   GLUCOSE 252*  --   --  337*  --   --  40*   BUN 83*  --   --  82*  --   --  74*   CREATININE 2.59* 3.02*  --  2.59*  --   --  2.11*   ANIONGAP 15  --   --  14  --   --  19*   LABGLOM 17* 14*  --  17*  --   --  22*   GFRAA 21*  --   --  21*  --   --  26*   CALCIUM 8.3*  --   --  8.6  --   --  8.9   PROBNP 1,442*  --   --   --   --   --  2,521*   TROPHS 69*  --  81*  --  82*  --  70*   CKTOTAL 194*  --   --   --   --   --   --    LACTACIDWB 5.2*  --   --   --   --  1.2  --      Recent Labs     20  1315  20  1821 20  1851 20  2240 20  0522 20  0709 20  0924 20  1333 20  1743   PROT 4.4*  --   --  4.9*  --  5.3*  --   --   --   --    LABALBU 2.5*  --   --  2.7*  --  2.9*  --   --   --   --    TSH  --   --   --   --   --  0.96  --   --   --   --    AST 45*  --   -- 64*  --  73*  --   --   --   --    ALT 33  --   --  43*  --  47*  --   --   --   --    LDH  --   --   --  249*  --   --   --   --   --   --    ALKPHOS 120*  --   --  144*  --  138*  --   --   --   --    BILITOT 0.68  --   --  0.62  --  0.65  --   --   --   --    BILIDIR 0.30  --   --   --   --   --   --   --   --   --    POCGLU  --    < > 313*  --  136*  --  124* 120* 202* 90    < > = values in this interval not displayed. ABG:  Lab Results   Component Value Date    FIO2 NOT REPORTED 08/06/2020     Lab Results   Component Value Date/Time    SPECIAL LAC 5ML 08/06/2020 01:45 PM     Lab Results   Component Value Date/Time    CULTURE NO GROWTH 19 HOURS 08/06/2020 01:45 PM       Radiology:  Xr Knee Left (3 Views)    Result Date: 8/6/2020  No fracture dislocation left knee s/p TKR. Ct Head Wo Contrast    Result Date: 8/6/2020  No acute intracranial abnormality. Chronic appearing paranasal sinus disease. Bilateral mastoiditis. Ct Cervical Spine Wo Contrast    Result Date: 8/6/2020  No acute abnormality of the cervical spine. Probable old compression deformity T4; correlate clinically. Osteopenia, multilevel degenerative changes and additional findings, as described in detail above. Ct Thoracic Spine Wo Contrast    Result Date: 8/6/2020  Chronic appearing moderate-severe compression deformity T4. An acute element should be considered if there is localized tenderness at this level. No additional thoracic spine fracture identified. Mild-moderate multilevel degenerative and additional findings, as above RECOMMENDATIONS: MRI could be obtained to assess for possible edema, if indicated clinically. Ct Lumbar Spine Wo Contrast    Result Date: 8/6/2020  No acute lumbar spine fracture. Multilevel degenerative changes/DDD, as described. Xr Chest Portable    Result Date: 8/6/2020  Patchy airspace and interstitial findings, better demonstrated on CT. Correlate for edema versus pneumonia.      Ct Chest Abdomen Pelvis Wo Contrast    Result Date: 8/7/2020  1. Patchy ground-glass opacification within the lungs, predominantly peribronchial in distribution, possibly mild edema or infiltrate. 2. Atherosclerotic calcification in the aorta and coronary circulation. No aneurysm. 3. Uncomplicated diverticulitis involving the proximal sigmoid colon. No evidence of perforation or abscess. No free fluid. 4. 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 5. T5 compression fracture. See CT of the thoracic and lumbar spine for complete details. Physical Examination:        General appearance:  alert, cooperative and no distress, elderly  female resting supine in bed  Mental Status:  oriented to person, place and time and affect  Lungs:  clear to auscultation bilaterally, normal effort  Heart:  regular rate and rhythm, no murmur  Abdomen:  soft, nontender, nondistended, normal bowel sounds, no masses, hepatomegaly, splenomegaly  Extremities:  no edema, redness, tenderness in the calves, tenderness to palpation of all four extremities  Skin:  no gross lesions, rashes, induration    Assessment:        Hospital Problems           Last Modified POA    * (Principal) Septic shock ([address] 8/7/2020 Yes    COVID-19 with multiple comorbidities 8/7/2020 Yes    Heart failure ([address] 8/7/2020 Yes    Falls 8/7/2020 Yes    Pneumonia due to COVID-19 virus 8/7/2020 Yes    Diverticulitis 8/7/2020 Yes    Hypothyroid 8/7/2020 Yes    Hypertension 8/7/2020 Yes    [place] (hard of hearing) 8/7/2020 Yes    Diabetes mellitus ([address] 8/7/2020 Yes    Chronic kidney disease 8/7/2020 Yes          Plan:        1. Appreciate cardio and ID input  2. Per ID: Convalescent plasma  3. Wean norepinephrine as able, check AM cortisol  4. Start cipro/flagyl for diverticulitis  5. Await culture data - no growth in blood cultures x 1 day  6. PRAFUL - improving with fluids, avoid nephrotoxins, CT a/p yesterday without evidence of hydro  7. Re-swab covid.  Patient's dghtr reports that she has been quarantined for 2 weeks at [facility]  8. Marcocytic anemia - b12/folate/tsh/hepatic function  9. Elevated Alk-phos - likely due to osteoporosis  10. Elevated trop - cardio following, echo in o/p setting  11. Wean O2 as able  12. Maintain droplet plus precautions  13. Await home medications - I have asked nursing to reach out to [facility] to complete patient's home meds  14.  PT/OT    33 Zofia Toney DO  8/7/2020  6:43 PM

## 2020-08-07 NOTE — PROGRESS NOTES
Physician Progress Note      PATIENTAura Body  CSN #:                  [id number]  :                       10/4/1923  ADMIT DATE:       2020 12:15 PM  DISCH DATE:  RESPONDING  PROVIDER #:        NATTY MATHIS DO          QUERY TEXT:    Pt admitted with COVID-19. Pt noted to have elevated creatinine. If possible,   please document in the progress notes and discharge summary if you are   evaluating and/or treating any of the following: The medical record reflects the following:  Risk Factors: CKD  Clinical Indicators: creatinine on admission 2.59, last creat per Epic from   2019 was 1.34  Treatment: IV fluids, labs, monitoring    Call if any questions. Thank you, Nancy Ortiz [facility] [phone]  Options provided:  -- Acute kidney failure  -- Acute kidney failure with acute tubular necrosis  -- Acute kidney injury  -- Other - I will add my own diagnosis  -- Disagree - Not applicable / Not valid  -- Disagree - Clinically unable to determine / Unknown  -- Refer to Clinical Documentation Reviewer    PROVIDER RESPONSE TEXT:    This patient has an Acute kidney injury. Query created by: Reinaldo Eric on 2020 6:28 AM      QUERY TEXT:    Pt admitted with COVID 19. Pt noted to have ground glass opacities on CT. If   possible, please document in the progress notes and discharge summary if you   are evaluating and/or treating any of the following: The medical record reflects the following:  Risk Factors: COVID-19+  Clinical Indicators: CT chest shows patchy ground-glass opacification within   the lungs, tachycardia, WBC 10.7, afebrile, lactic acid 5.8  Treatment: IV vanco and Zosyn, continuous O2, ID consult, labs and cultures    Call if any questions.  Thank you, Nancy Ortiz [facility] [phone]  Options provided:  -- COVID-19 viral pneumonia  -- Bacterial pneumonia  -- Viral pneumonia  -- Aspiration pneumonia  -- Other - I will add my own diagnosis  -- Disagree - Not applicable / Not valid  -- Disagree - Clinically unable to determine / Unknown  -- Refer to Clinical Documentation Reviewer    PROVIDER RESPONSE TEXT:    This patient has COVID-19 viral pneumonia. Query created by: Danni Gomes on 8/7/2020 6:34 AM      QUERY TEXT:    Pt admitted with COVID-19. Pt noted to have hypotension. If possible, please   document in the progress notes and discharge summary if you are evaluating   and/or treating any of the following: The medical record reflects the following:  Risk Factors: COVID-19  Clinical Indicators: SBP 80's, tachycardic  Treatment: IV fluid bolus, started on Dopamine by EMS, now on Levophed gtt, IV   atb    Call if any questions. Thank you, Stanislav Mead, [facility] [phone]  Options provided:  -- Septic Shock  -- Hypovolemic Shock  -- Hypovolemia without Shock  -- Hypotension without Shock  -- Other - I will add my own diagnosis  -- Disagree - Not applicable / Not valid  -- Disagree - Clinically unable to determine / Unknown  -- Refer to Clinical Documentation Reviewer    PROVIDER RESPONSE TEXT:    This patient has Septic Shock.     Query created by: Danni Gomes on 8/7/2020 6:36 AM      Electronically signed by:  Modesta Gosselin DO 8/7/2020 5:25 PM

## 2020-08-07 NOTE — PROGRESS NOTES
Comprehensive Nutrition Assessment    Type and Reason for Visit:  Initial, Positive Nutrition Screen(Wounds)    Nutrition Recommendations/Plan:   -Continue general, 4 CHO diabetic diet  -Recommend glucerna supplements BID   -Will monitor po intake, weights and wound healing     Nutrition Assessment:   Pt admitted d/t Loss of Consciousness. Pt + COVID-19. Writer attempted to call pt twice via phone - pt did not answer - SANDY po intake at this time. No wt hx in EMR. Pt noted w/ wound to sacrum. Will add supplements to compliment po intake and aide in wound healing progression. Malnutrition Assessment:  Malnutrition Status:  Insufficient data    Context:  Acute Illness     Findings of the 6 clinical characteristics of malnutrition:  Energy Intake:  Unable to assess  Weight Loss:  Unable to assess     Body Fat Loss:  Unable to assess     Muscle Mass Loss:  Unable to assess    Fluid Accumulation:  1 - Mild Extremities   Strength:  Not Performed    Estimated Daily Nutrient Needs:  Energy (kcal):  1.2-1.3 ~> 0656-0937 kcals/d; Weight Used for Energy Requirements:  Admission     Protein (g):  1.2-1.4 gm/kg ~> 63-73 gms/d; Weight Used for Protein Requirements:  Ideal          Nutrition Related Findings:  labs/meds reviewed      Wounds:  Multiple, Open Wounds, Skin Tears       Current Nutrition Therapies:    DIET GENERAL; Carb Control: 4 carb choices (60 gms)/meal    Anthropometric Measures:  · Height: 5' 3\" (160 cm)  · Current Body Weight: 148 lb (67.1 kg)   · Admission Body Weight: 148 lb (67.1 kg)    · Ideal Body Weight: 115 lbs; % Ideal Body Weight 128.7 %   · BMI: 26.2  · BMI Categories: Overweight (BMI 25.0-29. 9)       Nutrition Diagnosis:   · Increased nutrient needs related to increase demand for energy/nutrients(wound healing) as evidenced by wounds(Need for ONS)      Nutrition Interventions:   Food and/or Nutrient Delivery:  Continue Current Diet, Start Oral Nutrition Supplement  Nutrition Education/Counseling:  Education not indicated   Coordination of Nutrition Care:  Continued Inpatient Monitoring    Goals:  Pt to consume >75% of est'd needs via PO       Nutrition Monitoring and Evaluation:   Food/Nutrient Intake Outcomes:  Supplement Intake, Food and Nutrient Intake  Physical Signs/Symptoms Outcomes:  Biochemical Data, Weight, Skin, Nutrition Focused Physical Findings, GI Status     Discharge Planning:     Too soon to determine     Electronically signed by Gunjan hSah RD, LD on 8/7/20 at 12:50 PM EDT    Contact: [phone]

## 2020-08-07 NOTE — PROGRESS NOTES
Pharmacy Note     Renal Dose Adjustment    Alyssa Rose is a 80 y.o. female. Pharmacist assessment of renally cleared medications. Recent Labs     08/06/20 1851 08/07/20 0522   BUN 82* 74*       Recent Labs     08/06/20 1851 08/07/20  0522   CREATININE 2.59* 2.11*       Estimated Creatinine Clearance: 14 mL/min (A) (based on SCr of 2.11 mg/dL (H)). Height:   Ht Readings from Last 1 Encounters:   08/06/20 5' 3\" (1.6 m)     Weight:  Wt Readings from Last 1 Encounters:   08/06/20 148 lb 9.4 oz (67.4 kg)       The following medication dose has been adjusted based upon renal function per P&T Guidelines:             Famotidine decreased to 20 mg IV daily for CrCl below 50 ml/min.   Marlyn Gonzalez, PharmD, BCPS  8/7/2020  12:31 PM

## 2020-08-07 NOTE — PROGRESS NOTES
Occupational Therapy   Occupational Therapy Initial Assessment  Date: 2020   Patient Name: Corinne Currie  MRN: [id number]     : 10/4/1923    Date of Service: 2020    Discharge Recommendations:    Further therapy recommended at discharge. Assessment   Performance deficits / Impairments: Decreased functional mobility ; Decreased ADL status; Decreased endurance;Decreased high-level IADLs;Decreased cognition  Treatment Diagnosis: COVID  Prognosis: Good  Decision Making: Medium Complexity  Patient Education: pt ed on POc, purpose of eval, importance of movement, safety during functional transfers/functional mobility, benefits of therapy. fair return  REQUIRES OT FOLLOW UP: Yes  Activity Tolerance  Activity Tolerance: Patient Tolerated treatment well;Patient limited by fatigue  Safety Devices  Safety Devices in place: Yes  Type of devices: Patient at risk for falls;Call light within reach;Gait belt;Bed alarm in place; Left in bed  Restraints  Initially in place: No           Patient Diagnosis(es): The primary encounter diagnosis was COVID-19. Diagnoses of PRAFUL (acute kidney injury) ([address] Atrial fibrillation, unspecified type ([address] and Elevated troponin were also pertinent to this visit. has a past medical history of Chronic kidney disease, Constipated, Diabetes mellitus ([address] Falls, Heart failure ([address] [place] (hard of hearing), Hypertension, Hypothyroid, Muscle wasting, Muscle weakness, Patient in clinical research study, Pneumonia, and Sarcopenia. has no past surgical history on file. Treatment Diagnosis: COVID      Restrictions  Restrictions/Precautions  Restrictions/Precautions: Fall Risk(COVID +)  Required Braces or Orthoses?: No  Position Activity Restriction  Other position/activity restrictions: up with assist    Subjective   General  Patient assessed for rehabilitation services?: Yes  Family / Caregiver Present: No  Diagnosis: COVID  General Comment  Comments: Rn ok'd for therapy this afternoon.  Pt Contact guard assistance  Transfer Comments: with RW     Cognition  Overall Cognitive Status: Exceptions  Following Commands:  Follows one step commands with repetition  Attention Span: Difficulty dividing attention  Problem Solving: Assistance required to generate solutions  Insights: Decreased awareness of deficits  Initiation: Requires cues for some  Sequencing: Requires cues for some        Sensation  Overall Sensation Status: WFL        LUE AROM (degrees)  LUE AROM : Exceptions  L Shoulder Flexion 0-180: 0-60  L Elbow Flexion 0-145: WFL  L Wrist Flexion 0-80: WFL  L Wrist Extension 0-70: WFL  Left Hand AROM (degrees)  Left Hand AROM: WFL  RUE AROM (degrees)  RUE AROM : Exceptions  R Shoulder Flexion 0-180: 0-50  R Elbow Flexion 0-145: WFL  R Wrist Flexion 0-80: WFL  R Wrist Extension 0-70: WFL  Right Hand AROM (degrees)  Right Hand AROM: WFL  LUE Strength  Gross LUE Strength: Exceptions to [facility]  L Hand General: 4/5  RUE Strength  Gross RUE Strength: Exceptions to [facility]  R Hand General: 4/5         Plan   Plan  Times per week: 3-4x/wk    AM-PAC Score        AM-[facility] Inpatient Daily Activity Raw Score: 16 (08/07/20 1615)  AM-PAC Inpatient ADL T-Scale Score : 35.96 (08/07/20 1615)  ADL Inpatient CMS 0-100% Score: 53.32 (08/07/20 1615)  ADL Inpatient CMS G-Code Modifier : CK (08/07/20 1615)    Goals  Short term goals  Time Frame for Short term goals: pt will, by discharge  Short term goal 1: complete LB ADL with min A, set up and Ae, as needed  Short term goal 2: complete UB ADLs and grooming tasks with supervision  Short term goal 3: increase activity tolerance to 15+ minutes in order to participate in daily tasks  Short term goal 4: dem SBA during functional transfers/functional mobility with LRd, as needed  Short term goal 5: dem ~5 minutes static/dynamic standing tolerance with SBA and LRd in order to complete functional tasks       Therapy Time   Individual Concurrent Group Co-treatment   Time In 1412         Time Out 1443         Minutes 31         Timed Code Treatment Minutes: Gareth 1765, OTR/L

## 2020-08-08 NOTE — PROGRESS NOTES
Pharmacy Note     Renal Dose Adjustment    Jt Barrow is a 80 y.o. female. Pharmacist assessment of renally cleared medications. Recent Labs     08/06/20 1851 08/07/20 0522   BUN 82* 74*       Recent Labs     08/06/20 1851 08/07/20 0522   CREATININE 2.59* 2.11*       Estimated Creatinine Clearance: 14 mL/min (A) (based on SCr of 2.11 mg/dL (H)). Estimated CrCl using Ideal Body Weight: 12.90 mL/min (based on IBW 52.38 kg)    Height:   Ht Readings from Last 1 Encounters:   08/07/20 5' 3\" (1.6 m)     Weight:  Wt Readings from Last 1 Encounters:   08/06/20 148 lb 9.4 oz (67.4 kg)       The following medication dose has been adjusted based upon renal function per P&T Guidelines:             Ciprofloxacin 400 mg IV every 12 hours changed to 400 mg IV every 24 hours.     Kacey Hoff [place]  8/7/2020 9:43 PM

## 2020-08-08 NOTE — RESEARCH
Clinical Research Services          Patient Name: Curtis Salvador  MRN: [id number]  YOB: 1923  Date of evaluation: 8/8/2020  Time of evaluation: 1500  Reason for evaluation: 4hour post plasma transfusion assessment        Expanded Access to Convalescent Plasma for the Treatment of Patients with COVID-19  Protocol # [id number]     IND# [id number]       NCT# [id number]        [x]  On 8/7/2020 at 17:55 the subject received 2 unit of ABO compatible COVID-19 convalescent plasma. [x]  Chart reviewed and after discussion with the subjects RN, no adverse reactions were noted from the convalescent plasma.  This was all reviewed with Dr. Gerald Salcedo  [x]  Subject continues in follow-up     Chuy Beckett RN     Clinical Research Nurse  For questions page or perfect serve Dr. Gerald Salcedo, Dr. Alex Anthony, Dr. Mp Saini, Dr. Rich Siegel, Dr Juan Og, Dr. Rosa Maria Cisneros or contact the research nurse at [phone]    Ag Whitmore MD

## 2020-08-08 NOTE — PLAN OF CARE
Problem: Airway Clearance - Ineffective  Goal: Achieve or maintain patent airway  Outcome: Ongoing     Problem: Gas Exchange - Impaired  Goal: Absence of hypoxia  Outcome: Ongoing  Goal: Promote optimal lung function  Outcome: Ongoing     Problem: Breathing Pattern - Ineffective  Goal: Ability to achieve and maintain a regular respiratory rate  Outcome: Ongoing     Problem: Body Temperature -  Risk of, Imbalanced  Goal: Ability to maintain a body temperature within defined limits  Outcome: Ongoing  Goal: Will regain or maintain usual level of consciousness  Outcome: Ongoing  Goal: Complications related to the disease process, condition or treatment will be avoided or minimized  Outcome: Ongoing     Problem: Isolation Precautions - Risk of Spread of Infection  Goal: Prevent transmission of infection  Outcome: Ongoing     Problem: Nutrition Deficits  Goal: Optimize nutrtional status  Outcome: Ongoing     Problem: Risk for Fluid Volume Deficit  Goal: Maintain normal heart rhythm  Outcome: Ongoing  Goal: Maintain absence of muscle cramping  Outcome: Ongoing  Goal: Maintain normal serum potassium, sodium, calcium, phosphorus, and pH  Outcome: Ongoing     Problem: Loneliness or Risk for Loneliness  Goal: Demonstrate positive use of time alone when socialization is not possible  Outcome: Ongoing     Problem: Fatigue  Goal: Verbalize increase energy and improved vitality  Outcome: Ongoing     Problem: Patient Education: Go to Patient Education Activity  Goal: Patient/Family Education  Outcome: Ongoing     Problem: Pain:  Goal: Pain level will decrease  Description: Pain level will decrease  Outcome: Ongoing  Goal: Control of acute pain  Description: Control of acute pain  Outcome: Ongoing  Goal: Control of chronic pain  Description: Control of chronic pain  Outcome: Ongoing     Problem:  Bowel Function - Altered:  Goal: Bowel elimination is within specified parameters  Description: Bowel elimination is within specified parameters  Outcome: Ongoing     Problem: Fluid Volume - Imbalance:  Goal: Absence of imbalanced fluid volume signs and symptoms  Description: Absence of imbalanced fluid volume signs and symptoms  Outcome: Ongoing     Problem: Gas Exchange - Impaired:  Goal: Levels of oxygenation will improve  Description: Levels of oxygenation will improve  Outcome: Ongoing     Problem: Nutrition Deficit:  Goal: Ability to achieve adequate nutritional intake will improve  Description: Ability to achieve adequate nutritional intake will improve  Outcome: Ongoing     Problem: Skin Integrity - Impaired:  Goal: Will show no infection signs and symptoms  Description: Will show no infection signs and symptoms  Outcome: Ongoing  Goal: Absence of new skin breakdown  Description: Absence of new skin breakdown  Outcome: Ongoing     Problem: Tissue Perfusion - Cardiopulmonary, Altered:  Goal: Absence of angina  Description: Absence of angina  Outcome: Ongoing  Goal: Hemodynamic stability will improve  Description: Hemodynamic stability will improve  Outcome: Ongoing     Problem: Nutrition  Goal: Optimal nutrition therapy  Description: Nutrition Problem #1: Increased nutrient needs  Intervention: Food and/or Nutrient Delivery: Continue Current Diet, Start Oral Nutrition Supplement  Nutritional Goals: Pt to consume >75% of est'd needs via PO     Outcome: Ongoing     Problem: Skin Integrity:  Goal: Will show no infection signs and symptoms  Description: Will show no infection signs and symptoms  Outcome: Ongoing  Goal: Absence of new skin breakdown  Description: Absence of new skin breakdown  Outcome: Ongoing     Problem: Falls - Risk of:  Goal: Will remain free from falls  Description: Will remain free from falls  Outcome: Ongoing  Goal: Absence of physical injury  Description: Absence of physical injury  Outcome: Ongoing

## 2020-08-08 NOTE — PLAN OF CARE
Problem: Airway Clearance - Ineffective  Goal: Achieve or maintain patent airway  8/8/2020 1850 by Taylor Johnson RN  Outcome: Ongoing  8/8/2020 0734 by Jeni Ball RN  Outcome: Ongoing     Problem: Gas Exchange - Impaired  Goal: Absence of hypoxia  8/8/2020 1850 by Taylor Johnson RN  Outcome: Ongoing  8/8/2020 0734 by Jeni Ball RN  Outcome: Ongoing  Goal: Promote optimal lung function  8/8/2020 1850 by Taylor Johnson RN  Outcome: Ongoing  8/8/2020 0734 by Jeni Ball RN  Outcome: Ongoing     Problem: Breathing Pattern - Ineffective  Goal: Ability to achieve and maintain a regular respiratory rate  8/8/2020 1850 by Taylor Johnson RN  Outcome: Ongoing  8/8/2020 0734 by Jeni Ball RN  Outcome: Ongoing     Problem:  Body Temperature -  Risk of, Imbalanced  Goal: Ability to maintain a body temperature within defined limits  8/8/2020 1850 by Taylor Johnson RN  Outcome: Ongoing  8/8/2020 0734 by Jeni Ball RN  Outcome: Ongoing  Goal: Will regain or maintain usual level of consciousness  8/8/2020 1850 by Taylor Johnson RN  Outcome: Ongoing  8/8/2020 0734 by Jeni Ball RN  Outcome: Ongoing  Goal: Complications related to the disease process, condition or treatment will be avoided or minimized  8/8/2020 1850 by Taylor Johnson RN  Outcome: Ongoing  8/8/2020 0734 by Jeni Ball RN  Outcome: Ongoing     Problem: Isolation Precautions - Risk of Spread of Infection  Goal: Prevent transmission of infection  8/8/2020 1850 by Taylor Johnson RN  Outcome: Ongoing  8/8/2020 0734 by Jeni Ball RN  Outcome: Ongoing     Problem: Nutrition Deficits  Goal: Optimize nutrtional status  8/8/2020 1850 by Taylor Johnson RN  Outcome: Ongoing  8/8/2020 0734 by Jeni Ball RN  Outcome: Ongoing     Problem: Risk for Fluid Volume Deficit  Goal: Maintain normal heart rhythm  8/8/2020 1850 by Taylor Johnson RN  Outcome: Ongoing  8/8/2020 0734 by Jeni Ball RN  Outcome: Ongoing  Goal: Maintain absence of muscle cramping  8/8/2020 1850 by Jonah Beltre RN  Outcome: Ongoing  8/8/2020 0734 by Mark Werner RN  Outcome: Ongoing  Goal: Maintain normal serum potassium, sodium, calcium, phosphorus, and pH  8/8/2020 1850 by Jonah Beltre RN  Outcome: Ongoing  8/8/2020 0734 by Mark Werner RN  Outcome: Ongoing     Problem: Loneliness or Risk for Loneliness  Goal: Demonstrate positive use of time alone when socialization is not possible  8/8/2020 1850 by Jonah Beltre RN  Outcome: Ongoing  8/8/2020 0734 by Mark Werner RN  Outcome: Ongoing     Problem: Fatigue  Goal: Verbalize increase energy and improved vitality  8/8/2020 1850 by Jonah Beltre RN  Outcome: Ongoing  8/8/2020 0734 by Mark Werner RN  Outcome: Ongoing     Problem: Patient Education: Go to Patient Education Activity  Goal: Patient/Family Education  8/8/2020 1850 by Jonah Beltre RN  Outcome: Ongoing  8/8/2020 0734 by Mark Werner RN  Outcome: Ongoing     Problem: Pain:  Goal: Pain level will decrease  Description: Pain level will decrease  8/8/2020 1850 by Jonah Beltre RN  Outcome: Ongoing  8/8/2020 0734 by Mark Werner RN  Outcome: Ongoing  Goal: Control of acute pain  Description: Control of acute pain  8/8/2020 1850 by Jonah Beltre RN  Outcome: Ongoing  8/8/2020 0734 by Mark Werner RN  Outcome: Ongoing  Goal: Control of chronic pain  Description: Control of chronic pain  8/8/2020 1850 by Jonah Beltre RN  Outcome: Ongoing  8/8/2020 0734 by Mark Werner RN  Outcome: Ongoing     Problem:  Bowel Function - Altered:  Goal: Bowel elimination is within specified parameters  Description: Bowel elimination is within specified parameters  8/8/2020 1850 by Jonah Beltre RN  Outcome: Ongoing  8/8/2020 0734 by Mark Werner RN  Outcome: Ongoing     Problem: Fluid Volume - Imbalance:  Goal: Absence of imbalanced fluid volume signs and symptoms  Description: Absence of imbalanced fluid volume signs and symptoms  8/8/2020 1850 by Brenda De La Vega RN  Outcome: Ongoing  8/8/2020 0734 by Darryl Reyes RN  Outcome: Ongoing     Problem: Gas Exchange - Impaired:  Goal: Levels of oxygenation will improve  Description: Levels of oxygenation will improve  8/8/2020 1850 by Brenda De La Vega RN  Outcome: Ongoing  8/8/2020 0734 by Darryl Reyes RN  Outcome: Ongoing     Problem: Nutrition Deficit:  Goal: Ability to achieve adequate nutritional intake will improve  Description: Ability to achieve adequate nutritional intake will improve  8/8/2020 1850 by Brenda De La Vega RN  Outcome: Ongoing  8/8/2020 0734 by Darryl Reyes RN  Outcome: Ongoing     Problem: Skin Integrity - Impaired:  Goal: Will show no infection signs and symptoms  Description: Will show no infection signs and symptoms  8/8/2020 1850 by Brenda De La Vega RN  Outcome: Ongoing  8/8/2020 0734 by Darryl Reyes RN  Outcome: Ongoing  Goal: Absence of new skin breakdown  Description: Absence of new skin breakdown  8/8/2020 1850 by Brenda De La Vega RN  Outcome: Ongoing  8/8/2020 0734 by Darryl Reyes RN  Outcome: Ongoing     Problem: Tissue Perfusion - Cardiopulmonary, Altered:  Goal: Absence of angina  Description: Absence of angina  8/8/2020 1850 by Brenda De La Vega RN  Outcome: Ongoing  8/8/2020 0734 by Darryl Reyes RN  Outcome: Ongoing  Goal: Hemodynamic stability will improve  Description: Hemodynamic stability will improve  8/8/2020 1850 by Brenda De La Vega RN  Outcome: Ongoing  8/8/2020 0734 by Darryl Reyes RN  Outcome: Ongoing     Problem: Nutrition  Goal: Optimal nutrition therapy  Description: Nutrition Problem #1: Increased nutrient needs  Intervention: Food and/or Nutrient Delivery: Continue Current Diet, Start Oral Nutrition Supplement  Nutritional Goals: Pt to consume >75% of est'd needs via PO     8/8/2020 1850 by Brenda De La Vega RN  Outcome: Ongoing  8/8/2020 0734 by Darryl Reyes RN  Outcome: Ongoing     Problem: Skin Integrity:  Goal: Will show no infection signs and symptoms  Description: Will show no infection signs and symptoms  8/8/2020 1850 by Yong Bautista RN  Outcome: Ongoing  8/8/2020 0734 by Thu Mcgregor RN  Outcome: Ongoing  Goal: Absence of new skin breakdown  Description: Absence of new skin breakdown  8/8/2020 1850 by Yong Bautista RN  Outcome: Ongoing  8/8/2020 0734 by Thu Mcgregor RN  Outcome: Ongoing     Problem: Falls - Risk of:  Goal: Will remain free from falls  Description: Will remain free from falls  8/8/2020 1850 by Yong Bautista RN  Outcome: Ongoing  8/8/2020 0734 by Thu Mcgregor RN  Outcome: Ongoing  Goal: Absence of physical injury  Description: Absence of physical injury  8/8/2020 1850 by Yong Bautista RN  Outcome: Ongoing  8/8/2020 0734 by Thu Mcgregor RN  Outcome: Ongoing

## 2020-08-08 NOTE — CONSULTS
[facility]  Progress Note    Date: 8/8/2020    Hospital day :2     Impression:   1. Acute hypoxic respiratory failure on supplemental oxygen  2. COVID-19 virus pneumonia  COVID Tests:  · 8-6-20: Positive  · 8-7-20: pending  3. Non-ST elevation myocardial infarction type II  4. Sepsis/hypotension currently on Levophed for blood pressure support  5. Acute kidney injury on questionable chronic kidney disease  6. Old T4 compression deformity    Recommendations   · The patient continues on supplemental oxygen  · Due to the kidney disease she is not a candidate for Remdisivir  · I did discuss convalescent plasma with the patient and she has consented to receiving this  · She is not a candidate for any of the other study protocols at this time  · We will continue supportive therapy    Chief complaint/reason for consultation:   COVID-19 virus infection    History of Present Illness:   Maria Elena Caraballo is a 80y.o.-year-old female who was initially admitted on 8/6/2020. Jayjay Laboy resides in a nursing home and had a syncopal episode in the bathroom with incontinence of stool. She was found between the bathroom wall and the toilet. The episode was unwitnessed and the patient does not recall what happened. She presented with neck pain/knee pain/suprapubic discomfort and was found to be hypotensive by EMS, started on dopamine. The patient did have lines placed in the emergency department and work-up showed elevated lactic acid, BNP, troponin, BUN and creatinine. COVID testing was positive but the patient was not found to be hypoxic. CT imaging of the chest done did show some groundglass opacities in the peribronchial region. The patient did have an old T4 compression fracture on CT imaging of the spine.   The patient was started on Zosyn and vancomycin for sepsis and admitted to the [facility] unit we have been asked to evaluate and help with management    CURRENT EVALUATION : 8/8/2020    Patient evaluated and examined in the ICU. Afebrile  VS stable  BP supported with Levophed at 9 mcg  On nasal 02 at 2 L min  RR 26  02 sat 98 %    Renal function impaired with Cr Cl ~ 14 cc/min  Patient presently on Cipro 400 mg IV q 24 hr and Flagyl 500 mg TID  Difficult to ascertain how much of pulmonary infiltrates represents CHF    COVID Tests:  · 8-6-20: Positive  · 8-7-20: pending    CXR:  · 8-6-20: Patchy infiltrates. Edema vs pneumonia    I have personally reviewed the past medical history, past surgical history, medications, social history, and family history, and I have updated the database accordingly. Past Medical History:     Past Medical History:   Diagnosis Date    Chronic kidney disease     Constipated     Diabetes mellitus ([address]     Falls     Heart failure ([facility] [address]     Tribal (hard of hearing)     Hypertension     Hypothyroid     Muscle wasting     Muscle weakness     Patient in clinical research study 08/07/2020    Convalescent plasma for COVID 19  Anticipated date of completion 09/07/2020    Pneumonia     Sarcopenia      Past Surgical  History:   History reviewed. No pertinent surgical history. Medications:      famotidine (PEPCID) injection  20 mg Intravenous Daily    ciprofloxacin  400 mg Intravenous Q24H    metroNIDAZOLE  500 mg Intravenous Q8H    sodium chloride flush  10 mL Intravenous 2 times per day    heparin (porcine)  5,000 Units Subcutaneous 3 times per day    insulin lispro  0-12 Units Subcutaneous TID WC    insulin lispro  0-6 Units Subcutaneous Nightly     Social History:     Social History     Socioeconomic History    Marital status:       Spouse name: Not on file    Number of children: Not on file    Years of education: Not on file    Highest education level: Not on file   Occupational History    Not on file   Social Needs    Financial resource strain: Not on file    Food insecurity     Worry: Not on file     Inability: Not on file    Transportation needs     Medical: Not on file     Non-medical: Not on file   Tobacco Use    Smoking status: Never Smoker    Smokeless tobacco: Never Used   Substance and Sexual Activity    Alcohol use: Not Currently    Drug use: Not Currently    Sexual activity: Not Currently   Lifestyle    Physical activity     Days per week: Not on file     Minutes per session: Not on file    Stress: Not on file   Relationships    Social connections     Talks on phone: Not on file     Gets together: Not on file     Attends Rastafari service: Not on file     Active member of club or organization: Not on file     Attends meetings of clubs or organizations: Not on file     Relationship status: Not on file    Intimate partner violence     Fear of current or ex partner: Not on file     Emotionally abused: Not on file     Physically abused: Not on file     Forced sexual activity: Not on file   Other Topics Concern    Not on file   Social History Narrative    Not on file     Family History:   History reviewed. No pertinent family history. Allergies:   Patient has no known allergies. Review of Systems:   Unable to obtain though the patient is awake and alert she does not recall any thing that happened    Physical Examination :   /63   Pulse 89   Temp 99.1 °F (37.3 °C)   Resp 26   Ht 5' 3\" (1.6 m)   Wt 153 lb 7 oz (69.6 kg)   SpO2 98%   BMI 27.18 kg/m²     Temperature Range: Temp: 99.1 °F (37.3 °C) Temp  Av °F (37.2 °C)  Min: 98.9 °F (37.2 °C)  Max: 99.1 °F (37.3 °C)  General Appearance: Awake, alert, and in no apparent distress  Head: Normocephalic, without obvious abnormality, atraumatic  Eyes: Pupils equal, round, reactive, to light and accommodation; extraocular movements intact; sclera anicteric; conjunctivae pink  ENT: Oropharynx clear, without erythema, exudate, or thrush. Neck: Supple, without lymphadenopathy.    Pulmonary/Chest: Clear to auscultation, without wheezes, rales, or rhonchi  Cardiovascular: Regular rate and rhythm without murmurs, rubs, or gallops. Abdomen: soft, non-tender, non-distended, normal bowel sounds, no masses or organomegaly  Extremities: No cyanosis, clubbing, edema, or effusions. Neurologic: No gross sensory or motor deficits. Skin: Warm and dry with no rash. Medical Decision Making:   I have independently reviewed/ordered the following labs:  CBC with Differential:   Recent Labs     08/06/20  1315 08/08/20  0452   WBC 10.7 16.5*   HGB 10.2* 9.7*   HCT 31.3* 29.5*   PLT See Reflexed IPF Result 130*   LYMPHOPCT 2* 3*   MONOPCT 7 5     BMP:   Recent Labs     08/07/20  0522 08/08/20  0452    142   K 4.3 4.1    110*   CO2 18* 22   BUN 74* 54*   CREATININE 2.11* 1.72*     Hepatic Function Panel:   Recent Labs     08/06/20  1315  08/07/20  0522 08/08/20  0452   PROT 4.4*   < > 5.3* 4.7*   LABALBU 2.5*   < > 2.9* 2.6*   BILIDIR 0.30  --   --   --    IBILI 0.38  --   --   --    BILITOT 0.68   < > 0.65 0.54   ALKPHOS 120*   < > 138* 118*   ALT 33   < > 47* 70*   AST 45*   < > 73* 83*    < > = values in this interval not displayed. No results found for: CRP  No results found for: SEDRATE    No results for input(s): PROCAL in the last 72 hours.      Troponin, High Sensitivity  70High Panic    0 - 14 ng/L  Final  08/07/2020  5:22 AM  [address]      Pro-BNP  2,521High    <300 pg/mL  Final  08/07/2020  5:22 AM  [address]      Lactic Acid, Whole Blood  1.2  0.7 - 2.1 mmol/L  Final  08/07/2020  5:00 AM  [address]      D-Dimer, Quant  3.53  mg/L FEU  Final  08/06/2020 10:26 PM  [address]     [place]  [id number]    135 - 214 U/L  Final  08/06/2020  6:51 PM  [address]      Ferritin  542High    13 - 150 ug/L  Final  08/06/2020  6:51 PM  [facility]      SARS-CoV-2         Final  08/06/2020  3:08 PM  [facility]    SARS-CoV-2, Rapid  DETECTEDAbnormal    Not Detected  Final  08/06/2020  3:08 PM [address]      [place]  YELLOW  YELLOW  Final  08/06/2020  2:56 PM  [address]    Turbidity UA  CLEAR  CLEAR  Final  08/06/2020  2:56 PM  [address]    Glucose, Ur  NEGATIVE  NEGATIVE  Final  08/06/2020  2:56 PM  [address]    Bilirubin Urine  NEGATIVE  NEGATIVE  Final  08/06/2020  2:56 PM  [address]    Ketones, Urine  NEGATIVE  NEGATIVE  Final  08/06/2020  2:56 PM  [address]    Specific [place], UA  1.008  1.005 - 1.030  Final  08/06/2020  2:56 PM  [address]    Urine Hgb  SMALLAbnormal    NEGATIVE  Final  08/06/2020  2:56 PM  [address]    pH, UA  6.0  5.0 - 8.0  Final  08/06/2020  2:56 PM  [address]    Protein, [place]  NEGATIVE  NEGATIVE  Final  08/06/2020  2:56 PM  [address] Urine  Normal  Normal  Final  08/06/2020  2:56 PM  [address]    Nitrite, Urine  NEGATIVE  NEGATIVE  Final  08/06/2020  2:56 PM  [address]    Leukocyte Esterase, Urine  NEGATIVE  NEGATIVE  Final  08/06/2020  2:56 PM  [address]    Urinalysis Comments  NOT REPORTED   Final  08/06/2020  2:56 PM  [address]        WBC, UA  None  0 - 5 /HPF  Final  08/06/2020  2:56 PM  [address]    RBC, UA  None  0 - 4 /HPF  Final  08/06/2020  2:56 PM  [address]        Imaging Studies:   THREE XRAY VIEWS OF THE LEFT KNEE 8/6/2020 2:22 pm  FINDINGS:   No fracture dislocation left knee s/p TKR. CT OF THE HEAD WITHOUT CONTRAST  8/6/2020 1:44 pm  FINDINGS:   No acute intracranial abnormality. Chronic appearing paranasal sinus disease. Bilateral mastoiditis. CT OF THE CERVICAL SPINE WITHOUT CONTRAST 8/6/2020 1:44 pm   FINDINGS:   No acute abnormality of the cervical spine. Probable old compression deformity T4; correlate clinically.  Osteopenia, multilevel participate in the care of this patient. Please call with questions. Electronically signed by Cecilia Steiner MD on 8/8/2020 at 9:20 AM      [facility], [place]    [employer] messaging  OFFICE: [phone]      This note is created with the assistance of a speech recognition program.  While intending to generate a document that actually reflects the content of the visit, the document can still have some errors including those of syntax and sound a like substitutions which may escape proof reading. In such instances, actual meaning can be extrapolated by contextual diversion.

## 2020-08-09 NOTE — PLAN OF CARE
Ongoing       Problem: Patient Education: Go to Patient Education Activity  Goal: Patient/Family Education  8/9/2020 0706 by Quinton Murillo RN  Outcome: Ongoing       Problem: Pain:  Goal: Pain level will decrease  Description: Pain level will decrease  8/9/2020 0706 by Quinton Murillo RN  Outcome: Ongoing    Goal: Control of acute pain  Description: Control of acute pain  8/9/2020 0706 by Quinton Murillo RN  Outcome: Ongoing    Goal: Control of chronic pain  Description: Control of chronic pain  8/9/2020 0706 by Quinton Murillo RN  Outcome: Ongoing       Problem:  Bowel Function - Altered:  Goal: Bowel elimination is within specified parameters  Description: Bowel elimination is within specified parameters  8/9/2020 0706 by Quinton Muirllo RN  Outcome: Ongoing       Problem: Fluid Volume - Imbalance:  Goal: Absence of imbalanced fluid volume signs and symptoms  Description: Absence of imbalanced fluid volume signs and symptoms  8/9/2020 0706 by Quinton Murillo RN  Outcome: Ongoing       Problem: Gas Exchange - Impaired:  Goal: Levels of oxygenation will improve  Description: Levels of oxygenation will improve  8/9/2020 0706 by Quinton Murillo RN  Outcome: Ongoing       Problem: Nutrition Deficit:  Goal: Ability to achieve adequate nutritional intake will improve  Description: Ability to achieve adequate nutritional intake will improve  8/9/2020 0706 by Quinton Murillo RN  Outcome: Ongoing       Problem: Skin Integrity - Impaired:  Goal: Will show no infection signs and symptoms  Description: Will show no infection signs and symptoms  8/9/2020 0706 by Quinton Murillo RN  Outcome: Ongoing    Goal: Absence of new skin breakdown  Description: Absence of new skin breakdown  8/9/2020 0706 by Quinton Murillo RN  Outcome: Ongoing    Problem: Tissue Perfusion - Cardiopulmonary, Altered:  Goal: Absence of angina  Description: Absence of angina  8/9/2020 0706 by Quinton Murillo RN  Outcome: Ongoing    Goal: Hemodynamic stability will improve  Description: Hemodynamic stability will improve  8/9/2020 0706 by Reji Chua RN  Outcome: Ongoing       Problem: Nutrition  Goal: Optimal nutrition therapy  Description: Nutrition Problem #1: Increased nutrient needs  Intervention: Food and/or Nutrient Delivery: Continue Current Diet, Start Oral Nutrition Supplement  Nutritional Goals: Pt to consume >75% of est'd needs via PO     8/9/2020 0706 by Reji Chua RN  Outcome: Ongoing       Problem: Skin Integrity:  Goal: Will show no infection signs and symptoms  Description: Will show no infection signs and symptoms  8/9/2020 0706 by Reji Chua RN  Outcome: Ongoing    Goal: Absence of new skin breakdown  Description: Absence of new skin breakdown  8/9/2020 0706 by Reji Chua RN  Outcome: Ongoing       Problem: Falls - Risk of:  Goal: Will remain free from falls  Description: Will remain free from falls  8/9/2020 0706 by Reji Chua RN  Outcome: Ongoing    Goal: Absence of physical injury  Description: Absence of physical injury  8/9/2020 0706 by Reji Chua RN  Outcome: Ongoing

## 2020-08-09 NOTE — FLOWSHEET NOTE
08/09/20 0045   Neurological   Orientation Level Oriented X4  (Reoriented to time of day. )     Pt asks \"do I get any dinner\". Reoriented her to time of day. She did not eat her dinner earlier this shift and a snack was offered at this time. Gave her several options and she stated she just wanted a mervin cracker. She took a few bites of the mervin cracker and a several sips of water and stated she did not want anymore.

## 2020-08-09 NOTE — FLOWSHEET NOTE
RN updated patient's daughter Manda Johnson on patient's condition. No further questions at this time.     Ila Stevens RN

## 2020-08-09 NOTE — FLOWSHEET NOTE
Called and spoke with Raffaele Rucker from [facility] regarding medication list. Fax from last night never received. She is to attempt to fax medication list again.

## 2020-08-09 NOTE — PROGRESS NOTES
Patient is very hard of hearing, but has no complaints as noted below. Constitutional:  negative for chills, fevers, sweats  Respiratory:  negative for cough, dyspnea on exertion, shortness of breath, wheezing  Cardiovascular:  negative for chest pain, chest pressure/discomfort, lower extremity edema, palpitations  Gastrointestinal:  negative for abdominal pain, constipation, diarrhea, nausea, vomiting  Neurological:  negative for dizziness, headache    Medications: Allergies:  No Known Allergies    Current Meds:   Scheduled Meds:    atorvastatin  20 mg Oral Daily    docusate sodium  100 mg Oral BID    ferrous sulfate  325 mg Oral BID    famotidine (PEPCID) injection  20 mg Intravenous Daily    ciprofloxacin  400 mg Intravenous Q24H    metroNIDAZOLE  500 mg Intravenous Q8H    sodium chloride flush  10 mL Intravenous 2 times per day    heparin (porcine)  5,000 Units Subcutaneous 3 times per day    insulin lispro  0-12 Units Subcutaneous TID WC    insulin lispro  0-6 Units Subcutaneous Nightly     Continuous Infusions:    norepinephrine 7 mcg/min (08/08/20 1936)    dextrose      sodium chloride 50 mL/hr at 08/08/20 0749     PRN Meds: dextrose, dextrose, sodium chloride flush, acetaminophen **OR** acetaminophen, polyethylene glycol, promethazine **OR** ondansetron    Data:     Past Medical History:   has a past medical history of Chronic kidney disease, Constipated, Diabetes mellitus ([address] Falls, Heart failure ([address] [place] (hard of hearing), Hypertension, Hypothyroid, Muscle wasting, Muscle weakness, Patient in clinical research study, Pneumonia, and Sarcopenia. Social History:   reports that she has never smoked. She has never used smokeless tobacco. She reports previous alcohol use. She reports previous drug use. Family History: History reviewed. No pertinent family history.     Vitals:  /71   Pulse 76   Temp 99.1 °F (37.3 °C)   Resp 25   Ht 5' 3\" (1.6 m)   Wt 153 lb 7 oz (69.6 kg)   SpO2 98%   BMI 27.18 kg/m²   No data recorded. Recent Labs     08/07/20  1743 08/07/20 2015 08/08/20  0849 08/08/20  1656   POCGLU 90 150* 81 111*       I/O (24Hr): Intake/Output Summary (Last 24 hours) at 8/8/2020 2131  Last data filed at 8/8/2020 1935  Gross per 24 hour   Intake 1785.9 ml   Output 1230 ml   Net 555.9 ml       Labs:  Hematology:  Recent Labs     08/06/20  1315 08/06/20  1850 08/06/20  2226 08/08/20  0452   WBC 10.7  --   --  16.5*   RBC 3.08*  --   --  3.00*   HGB 10.2*  --   --  9.7*   HCT 31.3*  --   --  29.5*   .6  --   --  98.3   MCH 33.1  --   --  32.3   MCHC 32.6  --   --  32.9   RDW 13.4  --   --  13.7   PLT See Reflexed IPF Result  --   --  130*   MPV NOT REPORTED  --   --  12.4   INR 1.0 1.0  --   --    DDIMER  --  4.56 3.53 1.85     Chemistry:  Recent Labs     08/06/20  1315  08/06/20  1517 08/06/20  1851 08/06/20 2225 08/07/20  0500 08/07/20  0522 08/08/20  0452   *  --   --  134*  --   --  142 142   K 4.9  --   --  4.9  --   --  4.3 4.1     --   --  100  --   --  105 110*   CO2 17*  --   --  20  --   --  18* 22   GLUCOSE 252*  --   --  337*  --   --  40* 68*   BUN 83*  --   --  82*  --   --  74* 54*   CREATININE 2.59*   < >  --  2.59*  --   --  2.11* 1.72*   ANIONGAP 15  --   --  14  --   --  19* 10   LABGLOM 17*   < >  --  17*  --   --  22* 27*   GFRAA 21*  --   --  21*  --   --  26* 33*   CALCIUM 8.3*  --   --  8.6  --   --  8.9 8.7   PROBNP 1,442*  --   --   --   --   --  2,521*  --    TROPHS 69*  --  81*  --  82*  --  70*  --    CKTOTAL 194*  --   --   --   --   --   --   --    LACTACIDWB 5.2*  --   --   --   --  1.2  --   --     < > = values in this interval not displayed.      Recent Labs     08/06/20  1315  08/06/20  1851  08/07/20  0522  08/07/20  0924 08/07/20  1333 08/07/20  1743 08/07/20 2015 08/08/20  0452 08/08/20  0849 08/08/20  1656   PROT 4.4*  --  4.9*  --  5.3*  --   --   --   --   --  4.7*  --   --    LABALBU 2.5*  --  2.7*  --  2.9* --   --   --   --   --  2.6*  --   --    TSH  --   --   --   --  0.96  --   --   --   --   --   --   --   --    AST 45*  --  64*  --  73*  --   --   --   --   --  83*  --   --    ALT 33  --  43*  --  47*  --   --   --   --   --  70*  --   --    LDH  --   --  249*  --   --   --   --   --   --   --   --   --   --    ALKPHOS 120*  --  144*  --  138*  --   --   --   --   --  118*  --   --    BILITOT 0.68  --  0.62  --  0.65  --   --   --   --   --  0.54  --   --    BILIDIR 0.30  --   --   --   --   --   --   --   --   --   --   --   --    POCGLU  --    < >  --    < >  --    < > 120* 202* 90 150*  --  81 111*    < > = values in this interval not displayed. ABG:  Lab Results   Component Value Date    FIO2 NOT REPORTED 08/06/2020     Lab Results   Component Value Date/Time    SPECIAL LAC 5ML 08/06/2020 01:45 PM     Lab Results   Component Value Date/Time    CULTURE NO GROWTH 2 DAYS 08/06/2020 01:45 PM       Radiology:  Ike Lemus Knee Left (3 Views)    Result Date: 8/6/2020  No fracture dislocation left knee s/p TKR. Ct Head Wo Contrast    Result Date: 8/6/2020  No acute intracranial abnormality. Chronic appearing paranasal sinus disease. Bilateral mastoiditis. Ct Cervical Spine Wo Contrast    Result Date: 8/6/2020  No acute abnormality of the cervical spine. Probable old compression deformity T4; correlate clinically. Osteopenia, multilevel degenerative changes and additional findings, as described in detail above. Ct Thoracic Spine Wo Contrast    Result Date: 8/6/2020  Chronic appearing moderate-severe compression deformity T4. An acute element should be considered if there is localized tenderness at this level. No additional thoracic spine fracture identified. Mild-moderate multilevel degenerative and additional findings, as above RECOMMENDATIONS: MRI could be obtained to assess for possible edema, if indicated clinically.      Ct Lumbar Spine Wo Contrast    Result Date: 8/6/2020  No acute lumbar spine fracture. Multilevel degenerative changes/DDD, as described. Xr Chest Portable    Result Date: 8/6/2020  Patchy airspace and interstitial findings, better demonstrated on CT. Correlate for edema versus pneumonia. Ct Chest Abdomen Pelvis Wo Contrast    Result Date: 8/7/2020  1. Patchy ground-glass opacification within the lungs, predominantly peribronchial in distribution, possibly mild edema or infiltrate. 2. Atherosclerotic calcification in the aorta and coronary circulation. No aneurysm. 3. Uncomplicated diverticulitis involving the proximal sigmoid colon. No evidence of perforation or abscess. No free fluid. 4. 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 5. T5 compression fracture. See CT of the thoracic and lumbar spine for complete details. Physical Examination:        General appearance:  alert, cooperative and no distress, elderly  female resting supine in bed  Mental Status:  oriented to person, place and time and affect  Lungs:  clear to auscultation bilaterally, normal effort  Heart:  regular rate and rhythm, no murmur  Abdomen:  soft, nontender, nondistended, normal bowel sounds, no masses, hepatomegaly, splenomegaly  Extremities:  no edema, redness, tenderness in the calves, tenderness to palpation of all four extremities  Skin:  no gross lesions, rashes, induration    Assessment:        Hospital Problems           Last Modified POA    * (Principal) Septic shock ([address] 8/7/2020 Yes    COVID-19 with multiple comorbidities 8/7/2020 Yes    Heart failure ([address] 8/7/2020 Yes    Falls 8/7/2020 Yes    Pneumonia due to COVID-19 virus 8/7/2020 Yes    Diverticulitis 8/7/2020 Yes    Hypothyroid 8/7/2020 Yes    Hypertension 8/7/2020 Yes    [place] (hard of hearing) 8/7/2020 Yes    Diabetes mellitus ([address] 8/7/2020 Yes    Chronic kidney disease 8/7/2020 Yes          Plan:        1. Appreciate cardio and ID input  2. Per ID: Convalescent plasma  3.  Wean norepinephrine as able (restarted this evening)  4. Cortisol 25.1  5. Continue cipro/flagyl for diverticulitis  6. Await culture data - no growth in blood cultures x 2 day  7. PRAFUL - improving with fluids, avoid nephrotoxins  8. Re-swab covid - positive  9. Marcocytic anemia - b12/folate in AM  10. Transaminitis increasing - likely due to hypotension; continue to trend. May need abdominal ultrasound  11. Elevated Alk-phos - likely due to osteoporosis  12. Elevated trop - cardio following, echo in o/p setting  13. Wean O2 as able  14. Maintain droplet plus precautions  15. Restarted home meds  16. PT/OT  17.  Dispo: Continue to wean levo; IV abx    33 Zofia Toney DO  8/8/2020  9:31 PM

## 2020-08-09 NOTE — PROGRESS NOTES
[facility]    Progress Note    8/9/2020    9:52 AM    Name:   Lainey Ibanez  MRN:     [id number]     Acct:      [de-identified]   Room:   3004/[facility] Day:  3  Admit Date:  8/6/2020 12:15 PM    PCP:   Marly Moy MD  Code Status:  Full Code    Subjective:     C/C:   Chief Complaint   Patient presents with    Loss of Consciousness     had syncopal episode on bathroom, incontinent of bowels. neck pain. low bp, dopamine started by ems. shut off by arrival     Interval History Status: improved. Pt seen this morning. Levophed weaned off at 6:30 this am. Patient still on supplemental O2. Tried to wean overnight with desaturation. Fecal management system has been removed. Fair appetite. Intermittently mildly tachycardic. Tachypneic at times. Brief History:     Per my associate    Lainey Ibanez is a 80 y.o. Declined female who presents with Loss of Consciousness (had syncopal episode on bathroom, incontinent of bowels. neck pain. low bp, dopamine started by ems. shut off by arrival)   and is admitted to the hospital for the management of COVID-19 with multiple comorbidities. She has a hx of CHF and CKD.       Pt resides in a nursing home. She was found between the bathroom wall and [facility]. This was a presumed syncopal event. She does not know what happened and her only complaints are of neck pain, knee pain and suprapubic discomfort. In the ED she was found to be hypotensive and required a rt femoral central line. Lactic acid was elevated as was BNP, troponin and BUN/Creat. In addition her Covid was + but she was not hypoxic. CT chest showed ground glass opacities in the peribronchial region. CT of C-T-L spine revealed old T4 compression fx. Left knee films were neg.       Blood and urine cultures were sent. Pt received Vanco and Zosyn.       Vital signs have stabilized and pt is admitted to ICU for continued management.       Review of Systems:     Patient is very hard of hearing, but has no complaints as noted below. Constitutional:  negative for chills, fevers, sweats  Respiratory:  negative for cough, dyspnea on exertion, shortness of breath, wheezing  Cardiovascular:  negative for chest pain, chest pressure/discomfort, lower extremity edema, palpitations  Gastrointestinal:  negative for abdominal pain, constipation, diarrhea, nausea, vomiting  Neurological:  negative for dizziness, headache    Medications: Allergies:  No Known Allergies    Current Meds:   Scheduled Meds:    atorvastatin  20 mg Oral Daily    docusate sodium  100 mg Oral BID    ferrous sulfate  325 mg Oral BID    famotidine (PEPCID) injection  20 mg Intravenous Daily    ciprofloxacin  400 mg Intravenous Q24H    metroNIDAZOLE  500 mg Intravenous Q8H    sodium chloride flush  10 mL Intravenous 2 times per day    heparin (porcine)  5,000 Units Subcutaneous 3 times per day    insulin lispro  0-12 Units Subcutaneous TID WC    insulin lispro  0-6 Units Subcutaneous Nightly     Continuous Infusions:    norepinephrine Stopped (08/09/20 7989)    dextrose      sodium chloride 50 mL/hr at 08/08/20 0749     PRN Meds: dextrose, dextrose, sodium chloride flush, acetaminophen **OR** acetaminophen, polyethylene glycol, promethazine **OR** ondansetron    Data:     Past Medical History:   has a past medical history of Chronic kidney disease, Constipated, Diabetes mellitus ([address] Falls, Heart failure ([address] [place] (hard of hearing), Hypertension, Hypothyroid, Muscle wasting, Muscle weakness, Patient in clinical research study, Pneumonia, and Sarcopenia. Social History:   reports that she has never smoked. She has never used smokeless tobacco. She reports previous alcohol use. She reports previous drug use. Family History: History reviewed. No pertinent family history.     Vitals:  BP (!) 114/44   Pulse 103   Temp 99.1 °F (37.3 °C)   Resp (!) 33   Ht 5' 3\" (1.6 m) Wt 158 lb 1.1 oz (71.7 kg)   SpO2 95%   BMI 28.00 kg/m²   No data recorded. Recent Labs     08/08/20  1234 08/08/20  1656 08/08/20  2149 08/09/20  0938   POCGLU 139* 111* 157* 86       I/O (24Hr): Intake/Output Summary (Last 24 hours) at 8/9/2020 0952  Last data filed at 8/9/2020 0930  Gross per 24 hour   Intake 2096.6 ml   Output 1090 ml   Net 1006.6 ml       Labs:  Hematology:  Recent Labs     08/06/20  1315  08/06/20  1850 08/06/20  2226 08/08/20  0452 08/09/20  0551   WBC 10.7  --   --   --  16.5* 10.7   RBC 3.08*  --   --   --  3.00* 2.97*   HGB 10.2*  --   --   --  9.7* 9.8*   HCT 31.3*  --   --   --  29.5* 29.5*   .6  --   --   --  98.3 99.3   MCH 33.1  --   --   --  32.3 33.0   MCHC 32.6  --   --   --  32.9 33.2   RDW 13.4  --   --   --  13.7 14.0   PLT See Reflexed IPF Result  --   --   --  130* See Reflexed IPF Result   MPV NOT REPORTED  --   --   --  12.4 NOT REPORTED   INR 1.0  --  1.0  --   --   --    DDIMER  --    < > 4.56 3.53 1.85 1.31    < > = values in this interval not displayed.      Chemistry:  Recent Labs     08/06/20  1315  08/06/20  1517  08/06/20  2225 08/07/20  0500 08/07/20  0522 08/08/20  0452 08/09/20  0551   *  --   --    < >  --   --  142 142 140   K 4.9  --   --    < >  --   --  4.3 4.1 3.9     --   --    < >  --   --  105 110* 107   CO2 17*  --   --    < >  --   --  18* 22 21   GLUCOSE 252*  --   --    < >  --   --  40* 68* 117*   BUN 83*  --   --    < >  --   --  74* 54* 37*   CREATININE 2.59*   < >  --    < >  --   --  2.11* 1.72* 1.38*   ANIONGAP 15  --   --    < >  --   --  19* 10 12   LABGLOM 17*   < >  --    < >  --   --  22* 27* 35*   GFRAA 21*  --   --    < >  --   --  26* 33* 43*   CALCIUM 8.3*  --   --    < >  --   --  8.9 8.7 8.5*   PROBNP 1,442*  --   --   --   --   --  2,521*  --   --    TROPHS 69*  --  81*  --  82*  --  70*  --   --    CKTOTAL 194*  --   --   --   --   --   --   --   --    LACTACIDWB 5.2*  --   --   --   --  1.2  --   --   -- < > = values in this interval not displayed. Recent Labs     08/06/20  1315  08/06/20  1851  08/07/20  0522  08/07/20  2015 08/08/20  0452 08/08/20  0849 08/08/20  1234 08/08/20  1656 08/08/20  2149 08/09/20  0551 08/09/20  0938   PROT 4.4*  --  4.9*  --  5.3*  --   --  4.7*  --   --   --   --  4.6*  --    LABALBU 2.5*  --  2.7*  --  2.9*  --   --  2.6*  --   --   --   --  2.4*  --    TSH  --   --   --   --  0.96  --   --   --   --   --   --   --   --   --    AST 45*  --  64*  --  73*  --   --  83*  --   --   --   --  41*  --    ALT 33  --  43*  --  47*  --   --  70*  --   --   --   --  51*  --    LDH  --   --  249*  --   --   --   --   --   --   --   --   --   --   --    ALKPHOS 120*  --  144*  --  138*  --   --  118*  --   --   --   --  122*  --    BILITOT 0.68  --  0.62  --  0.65  --   --  0.54  --   --   --   --  0.43  --    BILIDIR 0.30  --   --   --   --   --   --   --   --   --   --   --   --   --    POCGLU  --    < >  --    < >  --    < > 150*  --  81 139* 111* 157*  --  86    < > = values in this interval not displayed. ABG:  Lab Results   Component Value Date    FIO2 NOT REPORTED 08/06/2020     Lab Results   Component Value Date/Time    SPECIAL LAC 5ML 08/06/2020 01:45 PM     Lab Results   Component Value Date/Time    CULTURE NO GROWTH 3 DAYS 08/06/2020 01:45 PM       Radiology:  Griselda Barter Knee Left (3 Views)    Result Date: 8/6/2020  No fracture dislocation left knee s/p TKR. Ct Head Wo Contrast    Result Date: 8/6/2020  No acute intracranial abnormality. Chronic appearing paranasal sinus disease. Bilateral mastoiditis. Ct Cervical Spine Wo Contrast    Result Date: 8/6/2020  No acute abnormality of the cervical spine. Probable old compression deformity T4; correlate clinically. Osteopenia, multilevel degenerative changes and additional findings, as described in detail above.      Ct Thoracic Spine Wo Contrast    Result Date: 8/6/2020  Chronic appearing moderate-severe compression deformity T4.  An acute element should be considered if there is localized tenderness at this level. No additional thoracic spine fracture identified. Mild-moderate multilevel degenerative and additional findings, as above RECOMMENDATIONS: MRI could be obtained to assess for possible edema, if indicated clinically. Ct Lumbar Spine Wo Contrast    Result Date: 8/6/2020  No acute lumbar spine fracture. Multilevel degenerative changes/DDD, as described. Xr Chest Portable    Result Date: 8/6/2020  Patchy airspace and interstitial findings, better demonstrated on CT. Correlate for edema versus pneumonia. Ct Chest Abdomen Pelvis Wo Contrast    Result Date: 8/7/2020  1. Patchy ground-glass opacification within the lungs, predominantly peribronchial in distribution, possibly mild edema or infiltrate. 2. Atherosclerotic calcification in the aorta and coronary circulation. No aneurysm. 3. Uncomplicated diverticulitis involving the proximal sigmoid colon. No evidence of perforation or abscess. No free fluid. 4. 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 5. T5 compression fracture. See CT of the thoracic and lumbar spine for complete details. Physical Examination:        In an effort to conserve PPE during COVID-19 pandemic, patient was seen from window and physical exam is deferred. Assessment:        Hospital Problems           Last Modified POA    * (Principal) Septic shock ([address] 8/7/2020 Yes    COVID-19 with multiple comorbidities 8/7/2020 Yes    Heart failure ([address] 8/7/2020 Yes    Falls 8/7/2020 Yes    Pneumonia due to COVID-19 virus 8/7/2020 Yes    Diverticulitis 8/7/2020 Yes    Hypothyroid 8/7/2020 Yes    Essential hypertension 8/9/2020 Yes    [place] (hard of hearing) 8/7/2020 Yes    Controlled type 2 diabetes mellitus with complication, without long-term current use of insulin ([address] 8/9/2020 Yes    Chronic kidney disease 8/7/2020 Yes    Transaminitis 8/8/2020 Yes          Plan:        1.  Appreciate cardio and ID input  2. Per ID: Convalescent plasma  3. Wean norepinephrine as able - discontinued this morning  4. Continue cipro/flagyl for diverticulitis  5. Await culture data - no growth in blood cultures x 3 day  6. PRAFUL - continues to improve; continue IVF @ 50 cc/hr  7. Marcocytic anemia - b12 normal, folate low  8. Transaminitis improving today - hold off on ultrasound at this time; likely 2/2 shock  9. Elevated Alk-phos - likely due to osteoporosis  10. Elevated trop - cardio following, echo in o/p setting  11. Wean O2 as able - still on 2L via NC today  12. Maintain droplet plus precautions  13. Restarted home meds  14. PT/OT - need to get her moving today/tomorrow - up in chair  15. Dispo:  Off levo; need to increase activity    NATTY MATHIS DO  8/9/2020  9:52 AM

## 2020-08-09 NOTE — PROGRESS NOTES
8/9/2020    Patient evaluated and examined in the ICU. Afebrile  VS stable  Off Levophed   On nasal 02 at 2 L min  RR 26-->28  02 sat 98-->95 %    Renal function impaired with Cr Cl ~ 14 cc/min  Patient presently on Cipro 400 mg IV q 24 hr and Flagyl 500 mg TID  FMS out  Difficult to ascertain how much of pulmonary infiltrates represents CHF vs pneumonia    COVID Tests:  · 8-6-20: Positive  · 8-7-20: pending    CXR:  · 8-6-20: Patchy infiltrates. Edema vs pneumonia    I have personally reviewed the past medical history, past surgical history, medications, social history, and family history, and I have updated the database accordingly. Past Medical History:     Past Medical History:   Diagnosis Date    Chronic kidney disease     Constipated     Diabetes mellitus ([address]     Falls     Heart failure ([address]     [place] (hard of hearing)     Hypertension     Hypothyroid     Muscle wasting     Muscle weakness     Patient in clinical research study 08/07/2020    Convalescent plasma for COVID 19  Anticipated date of completion 09/07/2020    Pneumonia     Sarcopenia      Past Surgical  History:   History reviewed. No pertinent surgical history. Medications:      miconazole   Topical BID    atorvastatin  20 mg Oral Daily    [Held by provider] docusate sodium  100 mg Oral BID    ferrous sulfate  325 mg Oral BID    famotidine (PEPCID) injection  20 mg Intravenous Daily    ciprofloxacin  400 mg Intravenous Q24H    metroNIDAZOLE  500 mg Intravenous Q8H    sodium chloride flush  10 mL Intravenous 2 times per day    heparin (porcine)  5,000 Units Subcutaneous 3 times per day    insulin lispro  0-12 Units Subcutaneous TID WC    insulin lispro  0-6 Units Subcutaneous Nightly     Social History:     Social History     Socioeconomic History    Marital status:       Spouse name: Not on file    Number of children: Not on file    Years of education: Not on file    Highest education level: Not on file Occupational History    Not on file   Social Needs    Financial resource strain: Not on file    Food insecurity     Worry: Not on file     Inability: Not on file    Transportation needs     Medical: Not on file     Non-medical: Not on file   Tobacco Use    Smoking status: Never Smoker    Smokeless tobacco: Never Used   Substance and Sexual Activity    Alcohol use: Not Currently    Drug use: Not Currently    Sexual activity: Not Currently   Lifestyle    Physical activity     Days per week: Not on file     Minutes per session: Not on file    Stress: Not on file   Relationships    Social connections     Talks on phone: Not on file     Gets together: Not on file     Attends Hinduism service: Not on file     Active member of club or organization: Not on file     Attends meetings of clubs or organizations: Not on file     Relationship status: Not on file    Intimate partner violence     Fear of current or ex partner: Not on file     Emotionally abused: Not on file     Physically abused: Not on file     Forced sexual activity: Not on file   Other Topics Concern    Not on file   Social History Narrative    Not on file     Family History:   History reviewed. No pertinent family history. Allergies:   Patient has no known allergies. Review of Systems:   Unable to obtain though the patient is awake and alert she does not recall any thing that happened    Physical Examination :   BP (!) 117/55   Pulse 103   Temp 99.1 °F (37.3 °C)   Resp 28   Ht 5' 3\" (1.6 m)   Wt 158 lb 1.1 oz (71.7 kg)   SpO2 95%   BMI 28.00 kg/m²     Temperature Range: Temp: 99.1 °F (37.3 °C) No data recorded  General Appearance: Awake, alert, and in no apparent distress  Head: Normocephalic, without obvious abnormality, atraumatic  Eyes: Pupils equal, round, reactive, to light and accommodation; extraocular movements intact; sclera anicteric; conjunctivae pink  ENT: Oropharynx clear, without erythema, exudate, or thrush.   Neck: Supple, without lymphadenopathy. Pulmonary/Chest: Clear to auscultation, without wheezes, rales, or rhonchi  Cardiovascular: Regular rate and rhythm without murmurs, rubs, or gallops. Abdomen: soft, non-tender, non-distended, normal bowel sounds, no masses or organomegaly  Extremities: No cyanosis, clubbing, edema, or effusions. Neurologic: No gross sensory or motor deficits. Skin: Warm and dry with no rash. Medical Decision Making:   I have independently reviewed/ordered the following labs:  CBC with Differential:   Recent Labs     08/08/20  0452 08/09/20  0551   WBC 16.5* 10.7   HGB 9.7* 9.8*   HCT 29.5* 29.5*   * See Reflexed IPF Result   LYMPHOPCT 3* 6*   MONOPCT 5 4     BMP:   Recent Labs     08/08/20  0452 08/09/20  0551    140   K 4.1 3.9   * 107   CO2 22 21   BUN 54* 37*   CREATININE 1.72* 1.38*     Hepatic Function Panel:   Recent Labs     08/06/20  1315  08/08/20  0452 08/09/20  0551   PROT 4.4*   < > 4.7* 4.6*   LABALBU 2.5*   < > 2.6* 2.4*   BILIDIR 0.30  --   --   --    IBILI 0.38  --   --   --    BILITOT 0.68   < > 0.54 0.43   ALKPHOS 120*   < > 118* 122*   ALT 33   < > 70* 51*   AST 45*   < > 83* 41*    < > = values in this interval not displayed. No results found for: CRP  No results found for: SEDRATE    No results for input(s): PROCAL in the last 72 hours.      Troponin, High Sensitivity  70High Panic    0 - 14 ng/L  Final  08/07/2020  5:22 AM  [facility]      Pro-BNP  2,521High    <300 pg/mL  Final  08/07/2020  5:22 AM  [facility]      Lactic Acid, Whole Blood  1.2  0.7 - 2.1 mmol/L  Final  08/07/2020  5:00 AM  [facility]      D-Dimer, Quant  3.53  mg/L FEU  Final  08/06/2020 10:26 PM  [facility]  [id number]    135 - 214 U/L  Final  08/06/2020  6:51 PM  [address] [facility]       Ferritin  542High    13 - 150 ug/L  Final  08/06/2020  6:51 PM  [employer] [facility] [employer] FINDINGS:   No acute abnormality of the cervical spine. Probable old compression deformity T4; correlate clinically. Osteopenia, multilevel degenerative changes and additional findings, as described in detail above. CT OF THE THORACIC SPINE WITHOUT CONTRAST  8/6/2020 1:44 pm:   FINDINGS:   Chronic appearing moderate-severe compression deformity T4. An acute element should be considered if there is localized tenderness at this level. No additional thoracic spine fracture identified. Mild-moderate multilevel degenerative and additional findings, as above RECOMMENDATIONS: MRI could be obtained to assess for possible edema, if indicated clinically. CT OF THE LUMBAR SPINE WITHOUT CONTRAST  8/6/2020  FINDINGS:   No acute lumbar spine fracture. Multilevel degenerative changes/DDD, as described. ONE XRAY VIEW OF THE CHEST 8/6/2020 2:22 pm   FINDINGS:   Patchy airspace and interstitial findings, better demonstrated on CT. Correlate for edema versus pneumonia. CT OF THE CHEST, ABDOMEN, AND PELVIS WITHOUT CONTRAST 8/6/2020 1:44 pm   FINDINGS:   1. Patchy ground-glass opacification within the lungs, predominantly peribronchial in distribution, possibly mild edema or infiltrate. 2. Atherosclerotic calcification in the aorta and coronary circulation. No aneurysm. 3. Uncomplicated diverticulitis involving the proximal sigmoid colon. No evidence of perforation or abscess. No free fluid. 4. 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 5. T5 compression fracture. See CT of the thoracic and lumbar spine for complete details. Cultures:     Culture, Blood 1 [[id number]]   Collected: 08/06/20 1345    Order Status: Completed  Specimen: Blood  Updated: 08/07/20 1102     Specimen Description  . BLOOD     Special Requests  LAC 5ML     Culture  NO GROWTH 19 HOURS    Culture, Blood 1 [[id number]]   Collected: 08/06/20 1315    Order Status: Completed  Specimen: Blood  Updated: 08/07/20 1102 Specimen Description  . BLOOD     Special Requests  R FEMORAL 10ML     Culture  NO GROWTH 21 HOURS            Thank you for allowing us to participate in the care of this patient. Please call with questions. Electronically signed by Evelyn Rizvi MD on 8/9/2020 at 10:29 AM      [facility] [address] Alfonso Nguyen MD    [employer]  OFFICE: [phone]      This note is created with the assistance of a speech recognition program.  While intending to generate a document that actually reflects the content of the visit, the document can still have some errors including those of syntax and sound a like substitutions which may escape proof reading. In such instances, actual meaning can be extrapolated by contextual diversion.

## 2020-08-10 NOTE — PROGRESS NOTES
hypoxic. CT imaging of the chest done did show some groundglass opacities in the peribronchial region. The patient did have an old T4 compression fracture on CT imaging of the spine. The patient was started on Zosyn and vancomycin for sepsis and admitted to the [facility] unit we have been asked to evaluate and help with management    Current evaluation:8/10/2020    /63   Pulse 104   Temp 99.1 °F (37.3 °C)   Resp (!) 32   Ht 5' 3\" (1.6 m)   Wt 158 lb 1.1 oz (71.7 kg)   SpO2 90%   BMI 28.00 kg/m²     Temperature Range: Temp: 99.1 °F (37.3 °C) No data recorded  The patient is seen and evaluated at bedside she is awake and sitting up in the chair. She is hard of hearing and reports no subjective fevers, no abdominal pain nausea vomiting. No dysuria frequency. Review of Systems   Constitutional: Negative. HENT: Positive for hearing loss. Respiratory: Negative. Cardiovascular: Negative. Gastrointestinal: Negative. Genitourinary: Negative. Musculoskeletal: Negative. Skin: Negative. Neurological: Negative. Psychiatric/Behavioral: Negative. Physical Examination :     Physical Exam  Constitutional:       Appearance: She is well-developed. HENT:      Head: Normocephalic and atraumatic. Neck:      Musculoskeletal: Normal range of motion and neck supple. Cardiovascular:      Rate and Rhythm: Regular rhythm. Heart sounds: Normal heart sounds. Pulmonary:      Effort: Pulmonary effort is normal.      Breath sounds: Rales present. Abdominal:      General: Bowel sounds are normal.      Palpations: Abdomen is soft. Skin:     General: Skin is warm and dry. Neurological:      Mental Status: She is alert and oriented to person, place, and time.          Laboratory data:   I have independently reviewed the followinglabs:  CBC with Differential:   Recent Labs     08/09/20  0551 08/10/20  0653   WBC 10.7 7.7   HGB 9.8* 9.5*   HCT 29.5* 29.2*   PLT See Reflexed IPF Result 116*   LYMPHOPCT 6* 5*   MONOPCT 4 6     BMP:   Recent Labs     08/09/20  0551 08/10/20  0653    143   K 3.9 4.3    112*   CO2 21 20   BUN 37* 31*   CREATININE 1.38* 1.28*     Hepatic Function Panel:   Recent Labs     08/09/20  0551 08/10/20  0653   PROT 4.6* 4.5*   LABALBU 2.4* 2.1*   BILITOT 0.43 0.44   ALKPHOS 122* 118*   ALT 51* 39*   AST 41* 28     No results for input(s): VANCOTROUGH in the last 72 hours. No results found for: CRP  No results found for: SEDRATE    No results for input(s): PROCAL in the last 72 hours. Imaging Studies:   ONE XRAY VIEW OF THE CHEST 8/10/2020 6:19 am    Impression    Pulmonary edema pattern has progressed compared to the previous exam.    Continued radiographic follow-up recommended to ensure clearance. Cultures:     Culture, Blood 1 [[id number]]   Collected: 08/06/20 1345    Order Status: Completed  Specimen: Blood  Updated: 08/09/20 0804     Specimen Description  . BLOOD     Special Requests  LAC 5ML     Culture  NO GROWTH 3 DAYS    Culture, Blood 1 [[id number]]   Collected: 08/06/20 1315    Order Status: Completed  Specimen: Blood  Updated: 08/09/20 0804     Specimen Description  . BLOOD     Special Requests  R FEMORAL 10ML     Culture  NO GROWTH 3 DAYS          Medications:      miconazole   Topical BID    folic acid  1 mg Oral Daily    atorvastatin  20 mg Oral Daily    [Held by provider] docusate sodium  100 mg Oral BID    ferrous sulfate  325 mg Oral BID    famotidine (PEPCID) injection  20 mg Intravenous Daily    ciprofloxacin  400 mg Intravenous Q24H    metroNIDAZOLE  500 mg Intravenous Q8H    sodium chloride flush  10 mL Intravenous 2 times per day    heparin (porcine)  5,000 Units Subcutaneous 3 times per day    insulin lispro  0-12 Units Subcutaneous TID WC    insulin lispro  0-6 Units Subcutaneous Nightly           [facility]  Kaylie Rivas MD  [employer]  OFFICE: [phone]      Electronically signed by Britni Rader MD on 8/10/2020 at 9:02 AM  Thank you for allowing us to participate in the care of this patient. Please call with questions. This note iscreated with the assistance of a speech recognition program.  While intending to generate a document that actually reflects the content of the visit, the document can still have some errors including those of syntax andsound a like substitutions which may escape proof reading. In such instances, actual meaning can be extrapolated by contextual diversion.

## 2020-08-10 NOTE — PROGRESS NOTES
Occupational Therapy  Facility/Department: [facility] 3  Daily Treatment Note  NAME: Merla Gowers  : 10/4/1923  MRN: [id number]    Date of Service: 8/10/2020    Discharge Recommendations:  Patient would benefit from continued therapy after discharge       Assessment   Performance deficits / Impairments: Decreased functional mobility ; Decreased ADL status; Decreased endurance;Decreased high-level IADLs;Decreased cognition  Treatment Diagnosis: COVID  Prognosis: Good  REQUIRES OT FOLLOW UP: Yes  Activity Tolerance  Activity Tolerance: Patient limited by fatigue;Treatment limited secondary to medical complications (free text)  Safety Devices  Safety Devices in place: Yes  Type of devices: Bed alarm in place;Call light within reach; Patient at risk for falls; Left in bed;Nurse notified         Patient Diagnosis(es): The primary encounter diagnosis was COVID-19. Diagnoses of PRAFUL (acute kidney injury) ([facility] [address] Atrial fibrillation, unspecified type ([facility] [address] and Elevated troponin were also pertinent to this visit. has a past medical history of Chronic kidney disease, Constipated, Diabetes mellitus ([facility] [address] Falls, Heart failure ([facility] [address] [place] (hard of hearing), Hypertension, Hypothyroid, Muscle wasting, Muscle weakness, Patient in clinical research study, Pneumonia, and Sarcopenia. has no past surgical history on file. Restrictions  Restrictions/Precautions  Restrictions/Precautions: General Precautions, Fall Risk(COVID +)  Required Braces or Orthoses?: No  Position Activity Restriction  Other position/activity restrictions: up with assist  Subjective   General  Patient assessed for rehabilitation services?: Yes  Family / Caregiver Present: No  Diagnosis: COVID  General Comment  Comments: Rn ok'd for therapy this afternoon. Pt fatigued with minimal participation in session.   Pain Assessment  Bloom-Davey Pain Rating: No hurt  Response to Pain Intervention: Patient Satisfied  Vital Signs  Patient Currently in Pain: Denies Orientation  A&OX1(awoke to name, declined to answer orientation questions)     Objective    ADL  Feeding: Unable to assess(comment)(pts food sitting on tray table, encouragement given to eat, )  Grooming: Stand by assistance;Setup;Verbal cueing(pt washed face, declined oral care and to comb hair, Dependent to comb hair)  UE Bathing: Unable to assess(comment)  LE Bathing: Unable to assess(comment)  UE Dressing: Unable to assess(comment)  LE Dressing: Unable to assess(comment)  Toileting: Unable to assess(comment)      Pt in bed upon arrival. Setup for grooming at tray table. Pt awoke to name, washed face and declined to do any additional self care. Encouragement given to complete self care and to eat lunch on tray table and pt said \"the less I do the better\". Pt keeping eyes closed most of the time, only opens when name is called. Pt remained in bed, call light and phone in reach. Bed alarm activated.        Plan   Plan  Times per week: 3-4x/wk    Goals  Short term goals  Time Frame for Short term goals: pt will, by discharge  Short term goal 1: complete LB ADL with min A, set up and Ae, as needed  Short term goal 2: complete UB ADLs and grooming tasks with supervision  Short term goal 3: increase activity tolerance to 15+ minutes in order to participate in daily tasks  Short term goal 4: dem SBA during functional transfers/functional mobility with LRd, as needed  Short term goal 5: dem ~5 minutes static/dynamic standing tolerance with SBA and LRd in order to complete functional tasks     Therapy Time   Individual Concurrent Group Co-treatment   Time In  1445         Time Out 1508         Minutes  23 total tx time                 SOWMYA TUCKER/JACKY

## 2020-08-10 NOTE — PLAN OF CARE
parameters  Outcome: Ongoing     Problem: Fluid Volume - Imbalance:  Goal: Absence of imbalanced fluid volume signs and symptoms  Description: Absence of imbalanced fluid volume signs and symptoms  Outcome: Ongoing     Problem: Gas Exchange - Impaired:  Goal: Levels of oxygenation will improve  Description: Levels of oxygenation will improve  Outcome: Ongoing     Problem: Nutrition Deficit:  Goal: Ability to achieve adequate nutritional intake will improve  Description: Ability to achieve adequate nutritional intake will improve  Outcome: Ongoing     Problem: Skin Integrity - Impaired:  Goal: Will show no infection signs and symptoms  Description: Will show no infection signs and symptoms  Outcome: Ongoing  Goal: Absence of new skin breakdown  Description: Absence of new skin breakdown  Outcome: Ongoing     Problem: Tissue Perfusion - Cardiopulmonary, Altered:  Goal: Absence of angina  Description: Absence of angina  Outcome: Ongoing  Goal: Hemodynamic stability will improve  Description: Hemodynamic stability will improve  Outcome: Ongoing     Problem: Nutrition  Goal: Optimal nutrition therapy  Description: Nutrition Problem #1: Increased nutrient needs  Intervention: Food and/or Nutrient Delivery: Continue Current Diet, Start Oral Nutrition Supplement  Nutritional Goals: Pt to consume >75% of est'd needs via PO     Outcome: Ongoing     Problem: Skin Integrity:  Goal: Will show no infection signs and symptoms  Description: Will show no infection signs and symptoms  Outcome: Ongoing  Goal: Absence of new skin breakdown  Description: Absence of new skin breakdown  Outcome: Ongoing     Problem: Falls - Risk of:  Goal: Will remain free from falls  Description: Will remain free from falls  Outcome: Ongoing  Goal: Absence of physical injury  Description: Absence of physical injury  Outcome: Ongoing

## 2020-08-10 NOTE — PROGRESS NOTES
Gill Romero [place] 19    Progress Note    8/10/2020    6:54 PM    Name:   Gonsalo Foy  MRN:     [id number]     Acct:      [de-identified]   Room:   3004/3004-[facility] Day:  4  Admit Date:  8/6/2020 12:15 PM    PCP:   Marito Palomares MD  Code Status:  Full Code    Subjective:     C/C:   Chief Complaint   Patient presents with    Loss of Consciousness     had syncopal episode on bathroom, incontinent of bowels. neck pain. low bp, dopamine started by ems. shut off by arrival     Interval History Status: improved. Pt seen and examined this morning. Off of levophed. Up in chair. Complaining of a significant amount of leg pain, which is unusual for her. Tolerating diet. Pressures somewhat improved, but still marginal. On 2 L via NC. Tachypneic. On supplemental O2. Renal function continues to improve. Brief History:     Per my associate    Gonsalo Fyo is a 80 y.o. Declined female who presents with Loss of Consciousness (had syncopal episode on bathroom, incontinent of bowels. neck pain. low bp, dopamine started by ems. shut off by arrival)   and is admitted to the hospital for the management of COVID-19 with multiple comorbidities. She has a hx of CHF and CKD.       Pt resides in a nursing home. She was found between the bathroom wall and [facility]. This was a presumed syncopal event. She does not know what happened and her only complaints are of neck pain, knee pain and suprapubic discomfort. In the ED she was found to be hypotensive and required a rt femoral central line. Lactic acid was elevated as was BNP, troponin and BUN/Creat. In addition her Covid was + but she was not hypoxic. CT chest showed ground glass opacities in the peribronchial region. CT of C-T-L spine revealed old T4 compression fx. Left knee films were neg.       Blood and urine cultures were sent.   Pt received Vanco and Zosyn.       Vital signs have stabilized and pt is admitted to ICU for continued management. - Off Levo  - On Cipro/flagyl for diverticulitis  - Duplexes pending to r/o DVT since we cannot check CT PE    Review of Systems:     Patient is very hard of hearing, but has no complaints as noted below. Constitutional:  negative for chills, fevers, sweats  Respiratory:  negative for cough, dyspnea on exertion, shortness of breath, wheezing  Cardiovascular:  negative for chest pain, chest pressure/discomfort, lower extremity edema, palpitations  Gastrointestinal:  negative for abdominal pain, constipation, diarrhea, nausea, vomiting  Neurological:  negative for dizziness, headache  Extremities: reports significant leg pain    Medications: Allergies:  No Known Allergies    Current Meds:   Scheduled Meds:    miconazole   Topical BID    folic acid  1 mg Oral Daily    atorvastatin  20 mg Oral Daily    [Held by provider] docusate sodium  100 mg Oral BID    ferrous sulfate  325 mg Oral BID    famotidine (PEPCID) injection  20 mg Intravenous Daily    ciprofloxacin  400 mg Intravenous Q24H    metroNIDAZOLE  500 mg Intravenous Q8H    sodium chloride flush  10 mL Intravenous 2 times per day    heparin (porcine)  5,000 Units Subcutaneous 3 times per day     Continuous Infusions:    norepinephrine Stopped (08/09/20 0637)    dextrose       PRN Meds: dextrose, dextrose, sodium chloride flush, acetaminophen **OR** acetaminophen, polyethylene glycol, promethazine **OR** ondansetron    Data:     Past Medical History:   has a past medical history of Chronic kidney disease, Constipated, Diabetes mellitus ([address] Falls, Heart failure ([address] [place] (hard of hearing), Hypertension, Hypothyroid, Muscle wasting, Muscle weakness, Patient in clinical research study, Pneumonia, and Sarcopenia. Social History:   reports that she has never smoked. She has never used smokeless tobacco. She reports previous alcohol use. She reports previous drug use. Family History: History reviewed.  No pertinent family history. Vitals:  BP (!) 117/49   Pulse 95   Temp 99.1 °F (37.3 °C)   Resp 30   Ht 5' 3\" (1.6 m)   Wt 158 lb 1.1 oz (71.7 kg)   SpO2 97%   BMI 28.00 kg/m²   No data recorded. Recent Labs     08/09/20  1721 08/09/20  2018 08/10/20  0955 08/10/20  1235   POCGLU 111* 95 74 136*       I/O (24Hr): Intake/Output Summary (Last 24 hours) at 8/10/2020 1854  Last data filed at 8/10/2020 1658  Gross per 24 hour   Intake 2859.1 ml   Output 935 ml   Net 1924.1 ml       Labs:  Hematology:  Recent Labs     08/08/20 0452 08/09/20  0551 08/10/20  0653   WBC 16.5* 10.7 7.7   RBC 3.00* 2.97* 2.90*   HGB 9.7* 9.8* 9.5*   HCT 29.5* 29.5* 29.2*   MCV 98.3 99.3 100.7   MCH 32.3 33.0 32.8   MCHC 32.9 33.2 32.5   RDW 13.7 14.0 14.2   * See Reflexed IPF Result 116*   MPV 12.4 NOT REPORTED 11.9   DDIMER 1.85 1.31  --      Chemistry:  Recent Labs     08/08/20 0452 08/09/20  0551 08/10/20  0653    140 143   K 4.1 3.9 4.3   * 107 112*   CO2 22 21 20   GLUCOSE 68* 117* 86   BUN 54* 37* 31*   CREATININE 1.72* 1.38* 1.28*   ANIONGAP 10 12 11   LABGLOM 27* 35* 39*   GFRAA 33* 43* 47*   CALCIUM 8.7 8.5* 8.8     Recent Labs     08/08/20  0452  08/09/20  0551 08/09/20  0938 08/09/20  1216 08/09/20  1721 08/09/20  2018 08/10/20  0653 08/10/20  0955 08/10/20  1235   PROT 4.7*  --  4.6*  --   --   --   --  4.5*  --   --    LABALBU 2.6*  --  2.4*  --   --   --   --  2.1*  --   --    AST 83*  --  41*  --   --   --   --  28  --   --    ALT 70*  --  51*  --   --   --   --  39*  --   --    ALKPHOS 118*  --  122*  --   --   --   --  118*  --   --    BILITOT 0.54  --  0.43  --   --   --   --  0.44  --   --    POCGLU  --    < >  --  86 128* 111* 95  --  74 136*    < > = values in this interval not displayed.      ABG:  Lab Results   Component Value Date    FIO2 NOT REPORTED 08/06/2020     Lab Results   Component Value Date/Time    SPECIAL LAC 5ML 08/06/2020 01:45 PM     Lab Results   Component Value Date/Time CULTURE NO GROWTH 4 DAYS 08/06/2020 01:45 PM       Radiology:  Xr Knee Left (3 Views)    Result Date: 8/6/2020  No fracture dislocation left knee s/p TKR. Ct Head Wo Contrast    Result Date: 8/6/2020  No acute intracranial abnormality. Chronic appearing paranasal sinus disease. Bilateral mastoiditis. Ct Cervical Spine Wo Contrast    Result Date: 8/6/2020  No acute abnormality of the cervical spine. Probable old compression deformity T4; correlate clinically. Osteopenia, multilevel degenerative changes and additional findings, as described in detail above. Ct Thoracic Spine Wo Contrast    Result Date: 8/6/2020  Chronic appearing moderate-severe compression deformity T4. An acute element should be considered if there is localized tenderness at this level. No additional thoracic spine fracture identified. Mild-moderate multilevel degenerative and additional findings, as above RECOMMENDATIONS: MRI could be obtained to assess for possible edema, if indicated clinically. Ct Lumbar Spine Wo Contrast    Result Date: 8/6/2020  No acute lumbar spine fracture. Multilevel degenerative changes/DDD, as described. Xr Chest Portable    Result Date: 8/6/2020  Patchy airspace and interstitial findings, better demonstrated on CT. Correlate for edema versus pneumonia. Ct Chest Abdomen Pelvis Wo Contrast    Result Date: 8/7/2020  1. Patchy ground-glass opacification within the lungs, predominantly peribronchial in distribution, possibly mild edema or infiltrate. 2. Atherosclerotic calcification in the aorta and coronary circulation. No aneurysm. 3. Uncomplicated diverticulitis involving the proximal sigmoid colon. No evidence of perforation or abscess. No free fluid. 4. 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 5. T5 compression fracture. See CT of the thoracic and lumbar spine for complete details.        Physical Examination:        General appearance:  alert, cooperative and no distress, elderly if activity increases    33 Zofia Toney DO  8/10/2020  6:54 PM

## 2020-08-10 NOTE — PROGRESS NOTES
Comment  Comments: Pt left in recliner with call light within reach, alarm activated and RN present in room at this writer's exit  Pain Screening  Patient Currently in Pain: Denies  Vital Signs  Patient Currently in Pain: Denies       Orientation  Orientation  Overall Orientation Status: Within Functional Limits(slow to respond at times, answers all questions appropriately with time)  Cognition      Objective   Bed mobility  Rolling to Left: Contact guard assistance  Supine to Sit: Minimal assistance(for trunk progression)  Sit to Supine: (pt left seated in recliner)  Scooting: Moderate assistance  Comment: increased time required. HOB elevated. No c/o dizziness seated EOB  Transfers  Sit to Stand: Minimal Assistance  Stand to sit: Contact guard assistance  Bed to Chair: Minimal assistance;2 Person Assistance(2nd person for pt safety)  Comment: Transfers performed with RW. RN present for mobility. Suggested use of gato stedy back to bed secondary to pt height and height of bed  Ambulation  Ambulation?: Yes  Ambulation 1  Surface: level tile  Device: Rolling Walker  Assistance: Minimal assistance;2 Person assistance  Quality of Gait: shuffling gait, flexed posture  Gait Deviations: Slow Shannon; Shuffles;Decreased step length;Decreased step height  Distance: 3ft bed to chair  Comments: apparent effort noted, pt limited by decreased endurance  Stairs/Curb  Stairs?: No     Balance  Posture: Fair  Sitting - Static: Fair  Sitting - Dynamic: Fair  Standing - Static: Fair;-  Standing - Dynamic: Fair;-  Comments: standing balance assessed with RW, EOB ~4 mins prior to amb  Exercises  Quad Sets: 10x bilat  Heelslides: 10x bilat  Gluteal Sets: 10x  Hip Abduction: 10x bilat  Knee Short Arc Quad: 10x bilat  Ankle Pumps: 10x bilat  Comments: ther exs completed in supine prior to mobility     Goals  Short term goals  Time Frame for Short term goals: 14 visits  Short term goal 1: Perform bed mobility and functional transfers with SBA  Short term goal 2: Ambulate 200ft with RW and SBA  Short term goal 3: Demo Fair+ dynamic standing balance to decrease risk of falls  Short term goal 4: Participate in 30 minutes of therapy to demo increased endurance    Plan    Plan  Times per week: 3-5x/wk  Current Treatment Recommendations: Strengthening, ROM, Balance Training, Functional Mobility Training, Transfer Training, Gait Training, Endurance Training, Home Exercise Program, Safety Education & Training, Patient/Caregiver Education & Training  Safety Devices  Type of devices: Nurse notified, Call light within reach, Gait belt, Left in bed, All fall risk precautions in place, Left in chair, Chair alarm in place  Restraints  Initially in place: No     Therapy Time   Individual Concurrent Group Co-treatment   Time In 0945         Time Out 1016         Minutes 31         Timed Code Treatment Minutes: [address] [place], [place]

## 2020-08-10 NOTE — PLAN OF CARE
Problem: Airway Clearance - Ineffective  Goal: Achieve or maintain patent airway  8/10/2020 1456 by Mavis Lui RN  Outcome: Ongoing  8/10/2020 0726 by Jovi Rhoades RN  Outcome: Ongoing     Problem: Gas Exchange - Impaired  Goal: Absence of hypoxia  8/10/2020 1456 by Mavis Lui RN  Outcome: Ongoing  8/10/2020 0726 by Jovi Rhoades RN  Outcome: Ongoing  Goal: Promote optimal lung function  8/10/2020 1456 by Mavis Lui RN  Outcome: Ongoing  8/10/2020 0726 by Jovi Rhoades RN  Outcome: Ongoing     Problem: Breathing Pattern - Ineffective  Goal: Ability to achieve and maintain a regular respiratory rate  8/10/2020 1456 by Mavis Lui RN  Outcome: Ongoing  8/10/2020 0726 by Jovi Rhoades RN  Outcome: Ongoing     Problem:  Body Temperature -  Risk of, Imbalanced  Goal: Ability to maintain a body temperature within defined limits  8/10/2020 1456 by Mavis Lui RN  Outcome: Ongoing  8/10/2020 0726 by Jovi Rhoades RN  Outcome: Ongoing  Goal: Will regain or maintain usual level of consciousness  8/10/2020 1456 by Mavis Lui RN  Outcome: Ongoing  8/10/2020 0726 by Jovi Rhoades RN  Outcome: Ongoing  Goal: Complications related to the disease process, condition or treatment will be avoided or minimized  8/10/2020 1456 by Mavis Lui RN  Outcome: Ongoing  8/10/2020 0726 by Jovi Rhoades RN  Outcome: Ongoing     Problem: Isolation Precautions - Risk of Spread of Infection  Goal: Prevent transmission of infection  8/10/2020 1456 by Mavis Lui RN  Outcome: Ongoing  8/10/2020 0726 by Jovi Rhoades RN  Outcome: Ongoing     Problem: Nutrition Deficits  Goal: Optimize nutrtional status  8/10/2020 1456 by Mavis Lui RN  Outcome: Ongoing  8/10/2020 0726 by Jovi Rhoades RN  Outcome: Ongoing     Problem: Risk for Fluid Volume Deficit  Goal: Maintain normal heart rhythm  8/10/2020 1456 by Mavis Lui RN  Outcome: Ongoing  8/10/2020 0726 by Jovi Rhoades RN  Outcome: Ongoing  Goal: Maintain absence of muscle cramping  8/10/2020 1456 by Edmund Cooper RN  Outcome: Ongoing  8/10/2020 0726 by Mckayla Leon RN  Outcome: Ongoing  Goal: Maintain normal serum potassium, sodium, calcium, phosphorus, and pH  8/10/2020 1456 by Edmund Cooper RN  Outcome: Ongoing  8/10/2020 0726 by Mckayla Leon RN  Outcome: Ongoing     Problem: Loneliness or Risk for Loneliness  Goal: Demonstrate positive use of time alone when socialization is not possible  8/10/2020 1456 by Edmund Cooper RN  Outcome: Ongoing  8/10/2020 0726 by Mckayla Leon RN  Outcome: Ongoing     Problem: Fatigue  Goal: Verbalize increase energy and improved vitality  8/10/2020 1456 by Edmund Cooper RN  Outcome: Ongoing  8/10/2020 0726 by Mckayla Leon RN  Outcome: Ongoing     Problem: Patient Education: Go to Patient Education Activity  Goal: Patient/Family Education  8/10/2020 1456 by Edmund Cooper RN  Outcome: Ongoing  8/10/2020 0726 by Mckayla Leon RN  Outcome: Ongoing     Problem: Pain:  Goal: Pain level will decrease  Description: Pain level will decrease  8/10/2020 1456 by Edmund Cooper RN  Outcome: Ongoing  8/10/2020 0726 by Mckayla Leon RN  Outcome: Ongoing  Goal: Control of acute pain  Description: Control of acute pain  8/10/2020 1456 by Edmund Cooper RN  Outcome: Ongoing  8/10/2020 0726 by Mckayla Leon RN  Outcome: Ongoing  Goal: Control of chronic pain  Description: Control of chronic pain  8/10/2020 1456 by Edmund Cooper RN  Outcome: Ongoing  8/10/2020 0726 by Mckayla Leon RN  Outcome: Ongoing     Problem:  Bowel Function - Altered:  Goal: Bowel elimination is within specified parameters  Description: Bowel elimination is within specified parameters  8/10/2020 1456 by Edmund Cooper RN  Outcome: Ongoing  8/10/2020 0726 by Mckayla Leon RN  Outcome: Ongoing     Problem: Fluid Volume - Imbalance:  Goal: Absence of imbalanced fluid volume signs and symptoms  Description: Absence of imbalanced fluid volume signs and symptoms  8/10/2020 1456 by Hasmukh Hoyos RN  Outcome: Ongoing  8/10/2020 0726 by Rakan Shelton RN  Outcome: Ongoing     Problem: Gas Exchange - Impaired:  Goal: Levels of oxygenation will improve  Description: Levels of oxygenation will improve  8/10/2020 1456 by Hasmukh Hoyos RN  Outcome: Ongoing  8/10/2020 0726 by Rakan Shelton RN  Outcome: Ongoing     Problem: Nutrition Deficit:  Goal: Ability to achieve adequate nutritional intake will improve  Description: Ability to achieve adequate nutritional intake will improve  8/10/2020 1456 by Hasmukh Hoyos RN  Outcome: Ongoing  8/10/2020 0726 by Rakan Shelton RN  Outcome: Ongoing     Problem: Skin Integrity - Impaired:  Goal: Will show no infection signs and symptoms  Description: Will show no infection signs and symptoms  8/10/2020 1456 by Hasmukh Hoyos RN  Outcome: Ongoing  8/10/2020 0726 by Rakan Shelton RN  Outcome: Ongoing  Goal: Absence of new skin breakdown  Description: Absence of new skin breakdown  8/10/2020 1456 by Hasmukh Hoyos RN  Outcome: Ongoing  8/10/2020 0726 by Rakan Shelton RN  Outcome: Ongoing     Problem: Tissue Perfusion - Cardiopulmonary, Altered:  Goal: Absence of angina  Description: Absence of angina  8/10/2020 1456 by Hasmukh Hoyos RN  Outcome: Ongoing  8/10/2020 0726 by Rakan Shelton RN  Outcome: Ongoing  Goal: Hemodynamic stability will improve  Description: Hemodynamic stability will improve  8/10/2020 1456 by Hasmukh Hoyos RN  Outcome: Ongoing  8/10/2020 0726 by Rakan Shelton RN  Outcome: Ongoing     Problem: Nutrition  Goal: Optimal nutrition therapy  Description: Nutrition Problem #1: Increased nutrient needs  Intervention: Food and/or Nutrient Delivery: Continue Current Diet, Start Oral Nutrition Supplement  Nutritional Goals: Pt to consume >75% of est'd needs via PO     8/10/2020 1456 by Hasmukh Hoyos RN  Outcome: Ongoing  8/10/2020 0726 by Aliyah Hayward RN  Outcome: Ongoing     Problem: Skin Integrity:  Goal: Will show no infection signs and symptoms  Description: Will show no infection signs and symptoms  8/10/2020 1456 by Carlee Jay RN  Outcome: Ongoing  8/10/2020 0726 by Aliyah Hayward RN  Outcome: Ongoing  Goal: Absence of new skin breakdown  Description: Absence of new skin breakdown  8/10/2020 1456 by Carlee Jay RN  Outcome: Ongoing  8/10/2020 0726 by Aliyah Hayward RN  Outcome: Ongoing     Problem: Falls - Risk of:  Goal: Will remain free from falls  Description: Will remain free from falls  8/10/2020 1456 by Carlee Jay RN  Outcome: Ongoing  8/10/2020 0726 by Aliyah Hayward RN  Outcome: Ongoing  Goal: Absence of physical injury  Description: Absence of physical injury  8/10/2020 1456 by Carlee Jay RN  Outcome: Ongoing  8/10/2020 0726 by Aliyah Hayward RN  Outcome: Ongoing

## 2020-08-11 NOTE — PROGRESS NOTES
Writer called pts daughter Ayla Solorzano. Updated with all questions and concerns answered at this time.     Electronically signed by Sarah Hernandez RN on 8/11/2020 at 10:41 AM

## 2020-08-11 NOTE — PROGRESS NOTES
Physical Therapy  Facility/Department: [facility]  Daily Treatment Note  NAME: Poonam Barber  : 10/4/1923  MRN: [id number]    Date of Service: 2020    Discharge Recommendations:  Patient would benefit from continued therapy after discharge   PT Equipment Recommendations  Equipment Needed: No    Assessment   Body structures, Functions, Activity limitations: Decreased functional mobility ; Decreased balance;Decreased endurance;Decreased safe awareness;Decreased ROM; Decreased strength  Assessment: The pt was grossly MOD A for bed mobs , min for transfer, able to amb a total of 9ft with 1 seated rest break. limited by SOB fatigue and decrease endurance. . Recommend continued PT to maximize safety and progress toward prior level of independence. Prognosis: Good  Barriers to Learning: [place]  REQUIRES PT FOLLOW UP: Yes  Activity Tolerance  Activity Tolerance: Patient limited by fatigue;Patient limited by endurance     Patient Diagnosis(es): The primary encounter diagnosis was COVID-19. Diagnoses of PRAFUL (acute kidney injury) ([address] Atrial fibrillation, unspecified type ([address] and Elevated troponin were also pertinent to this visit. has a past medical history of Chronic kidney disease, Constipated, Diabetes mellitus ([address] Falls, Heart failure ([address] [place] (hard of hearing), Hypertension, Hypothyroid, Muscle wasting, Muscle weakness, Patient in clinical research study, Pneumonia, and Sarcopenia. has no past surgical history on file. Restrictions  Restrictions/Precautions  Restrictions/Precautions: General Precautions, Fall Risk  Required Braces or Orthoses?: No  Position Activity Restriction  Other position/activity restrictions: up with assist  Subjective   General  Response To Previous Treatment: Patient with no complaints from previous session. Family / Caregiver Present: No  Subjective  Subjective: RN and pt agreeable to PT. Pt sleeping in bed upon arrival, cooperative throughout.   General Comment  Comments: Pt left in recliner with call light within reach, alarm activated. Pain Screening  Patient Currently in Pain: Denies  Vital Signs  Patient Currently in Pain: Denies       Orientation  Orientation  Overall Orientation Status: Within Functional Limits  Cognition      Objective   Bed mobility  Supine to Sit: Moderate assistance  Scooting: Moderate assistance  Comment: Increase time and effort noted, Pt required MOD- CGA for sitting balance at EOB d/t dizziness. Pt tachy up to 167BPM upon sitting; improves with time. Transfers  Sit to Stand: Minimal Assistance  Stand to sit: Contact guard assistance  Bed to Chair: Minimal assistance  Comment: Transfers performed with RW. Ambulation  Ambulation?: Yes  Ambulation 1  Surface: level tile  Device: Rolling Walker  Assistance: Minimal assistance  Quality of Gait: shuffling gait, flexed posture  Gait Deviations: Slow Shannon; Shuffles;Decreased step length;Decreased step height  Distance: 3ft x3  Comments: apparent effort noted, fatigues quickly, pt limited by decreased endurance     Balance  Sitting - Static: Fair  Sitting - Dynamic: Fair  Standing - Static: Fair  Standing - Dynamic: Fair;-  Comments: standing balance assessed with RW,   Tolerated ~8 mins at EOB prior to amb  Exercises   Upper extremity exercises: Bicep curl, shoulder flexion/extension, punches, tricep curl, shoulder abduction/adduction. Reps: x10  Lower extremity exercises: Seated LE exercise program: Long Arc Quads, hip abduction/adduction, heel/toe raises, and marches.  Reps: x10    Goals  Short term goals  Time Frame for Short term goals: 14 visits  Short term goal 1: Perform bed mobility and functional transfers with SBA  Short term goal 2: Ambulate 200ft with RW and SBA  Short term goal 3: Demo Fair+ dynamic standing balance to decrease risk of falls  Short term goal 4: Participate in 30 minutes of therapy to demo increased endurance    Plan    Plan  Times per week: 3-5x/wk  Current Treatment Recommendations: Strengthening, ROM, Balance Training, Functional Mobility Training, Transfer Training, Gait Training, Endurance Training, Home Exercise Program, Safety Education & Training, Patient/Caregiver Education & Training  Safety Devices  Type of devices: Nurse notified, Call light within reach, Gait belt, Left in bed, All fall risk precautions in place, Left in chair, Chair alarm in place  Restraints  Initially in place: No     Therapy Time   Individual Concurrent Group Co-treatment   Time In 0928         Time Out 1016         Minutes 48         Timed Code Treatment Minutes: ESAU Ríos

## 2020-08-11 NOTE — PROGRESS NOTES
Gill Tanner 19    Progress Note    8/11/2020    10:33 AM    Name:   Remington Bishop  MRN:     [id number]     Acct:      [de-identified]   Room:   [facility] [place] [place] Day:  5  Admit Date:  8/6/2020 12:15 PM    PCP:   Dave Falcon MD  Code Status:  Full Code    Subjective:     C/C:   Chief Complaint   Patient presents with    Loss of Consciousness     had syncopal episode on bathroom, incontinent of bowels. neck pain. low bp, dopamine started by ems. shut off by arrival     Interval History Status: not changed. Feels ok-her leg pain seems to actually be left knee pain-and she is ttp there where a bruise is  She states she fell but doesn't remember how    Very [place]    Brief History:     Per my associate     Thor Maldonado a 80 y.o. Declined female who presents with Loss of Consciousness (had syncopal episode on bathroom, incontinent of bowels. neck pain. low bp, dopamine started by ems.  shut off by arrival)   and is admitted to the hospital for the management of COVID-19 with multiple comorbidities.  She has a hx of CHF and CKD.       Pt resides in a nursing home. Dirk Penning was found between the bathroom wall and toliet.  This was a presumed syncopal event.  She does not know what happened and her only complaints are of neck pain, knee pain and suprapubic discomfort.  In the ED she was found to be hypotensive and required a rt femoral central line.  Lactic acid was elevated as was BNP, troponin and BUN/Creat.   In addition her Covid was + but she was not hypoxic.  CT chest showed ground glass opacities in the peribronchial region.  CT of C-T-L spine revealed old T4 compression fx.  Left knee films were neg.       Blood and urine cultures were sent.  Pt received Vanco and Zosyn.       Vital signs have stabilized and pt is admitted to ICU for continued management.       - Off Levo  - On Cipro/flagyl for diverticulitis\"    Received convalescent plasma    Review of Systems:     unobtainable  Extremely [place]    Medications: Allergies:  No Known Allergies    Current Meds:   Scheduled Meds:    miconazole   Topical BID    folic acid  1 mg Oral Daily    atorvastatin  20 mg Oral Daily    [Held by provider] docusate sodium  100 mg Oral BID    ferrous sulfate  325 mg Oral BID    famotidine (PEPCID) injection  20 mg Intravenous Daily    ciprofloxacin  400 mg Intravenous Q24H    metroNIDAZOLE  500 mg Intravenous Q8H    sodium chloride flush  10 mL Intravenous 2 times per day    heparin (porcine)  5,000 Units Subcutaneous 3 times per day     Continuous Infusions:    dextrose       PRN Meds: dextrose, dextrose, sodium chloride flush, acetaminophen **OR** acetaminophen, polyethylene glycol, promethazine **OR** ondansetron    Data:     Past Medical History:   has a past medical history of Chronic kidney disease, Constipated, Diabetes mellitus ([address] Falls, Heart failure ([facility] [place] (hard of hearing), Hypertension, Hypothyroid, Muscle wasting, Muscle weakness, Patient in clinical research study, Pneumonia, and Sarcopenia. Social History:   reports that she has never smoked. She has never used smokeless tobacco. She reports previous alcohol use. She reports previous drug use. Family History: History reviewed. No pertinent family history. Vitals:  /74   Pulse 119   Temp 99.9 °F (37.7 °C) (Bladder)   Resp (!) 33   Ht 5' 3\" (1.6 m)   Wt 169 lb 12.1 oz (77 kg)   SpO2 91%   BMI 30.07 kg/m²   Temp (24hrs), Av.5 °F (36.9 °C), Min:97.5 °F (36.4 °C), Max:99.9 °F (37.7 °C)    Recent Labs     20  1721 20  2018 08/10/20  0955 08/10/20  1235   POCGLU 111* 95 74 136*       I/O (24Hr):     Intake/Output Summary (Last 24 hours) at 2020 1033  Last data filed at 8/10/2020 1658  Gross per 24 hour   Intake 331.1 ml   Output 250 ml   Net 81.1 ml       Labs:  Hematology:  Recent Labs     20  0551 08/10/20  0653 20  0651   WBC 10.7 7.7 9.8   RBC clinically. Osteopenia, multilevel degenerative changes and additional findings, as described in detail above. Ct Thoracic Spine Wo Contrast    Result Date: 8/6/2020  Chronic appearing moderate-severe compression deformity T4. An acute element should be considered if there is localized tenderness at this level. No additional thoracic spine fracture identified. Mild-moderate multilevel degenerative and additional findings, as above RECOMMENDATIONS: MRI could be obtained to assess for possible edema, if indicated clinically. Ct Lumbar Spine Wo Contrast    Result Date: 8/6/2020  No acute lumbar spine fracture. Multilevel degenerative changes/DDD, as described. Xr Chest Portable    Result Date: 8/6/2020  Patchy airspace and interstitial findings, better demonstrated on CT. Correlate for edema versus pneumonia. Ct Chest Abdomen Pelvis Wo Contrast    Result Date: 8/7/2020  1. Patchy ground-glass opacification within the lungs, predominantly peribronchial in distribution, possibly mild edema or infiltrate. 2. Atherosclerotic calcification in the aorta and coronary circulation. No aneurysm. 3. Uncomplicated diverticulitis involving the proximal sigmoid colon. No evidence of perforation or abscess. No free fluid. 4. 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 5. T5 compression fracture. See CT of the thoracic and lumbar spine for complete details.        Physical Examination:        General appearance:  alert, cooperative and no distress  Mental Status:  oriented to person, place and time and normal affect  Lungs:  clear to auscultation bilaterally, normal effort  Heart:  regular rate and rhythm, no murmur  Abdomen:  soft, nontender, nondistended, normal bowel sounds, no masses, hepatomegaly, splenomegaly  Extremities:  no edema, redness, tenderness in the calves  Skin:  Left knee bruised    Assessment:        Hospital Problems           Last Modified POA    * (Principal) Septic shock ([address]  -due to covid, +/- diverticulitis as well 8/7/2020 Yes    Diverticulitis 8/7/2020 Yes    COVID-19 with multiple comorbidities 8/7/2020 Yes    Hypothyroid 8/7/2020 Yes    Essential hypertension 8/9/2020 Yes    [place] (hard of hearing) 8/7/2020 Yes    Heart failure ([facility] [address] [address] 8/7/2020 Yes    Falls 8/7/2020 Yes    Controlled type 2 diabetes mellitus with complication, without long-term current use of insulin [facility] 8/9/2020 Yes    Chronic kidney disease 8/7/2020 Yes    Pneumonia due to COVID-19 virus 8/7/2020 Yes    Transaminitis 8/8/2020 Yes          Plan:        1. Cancel venous doppler-doubt dvt/pe  2.  Improving, likely dc in next 24-48h    Rodríguez GOLDSTEIN Blood, DO  8/11/2020  10:33 AM

## 2020-08-11 NOTE — CARE COORDINATION
Care Transition  Via Shelli Mendosa [address] at [address] they will accept her back. They will need to pull clinical information.

## 2020-08-11 NOTE — PROGRESS NOTES
Writer wrote \"Patient in bed resting. Pulse 115, respirations have been in the 30's, BP is 126/59, temp is 99.7. She has a little bit of belly breathing, respirations sometimes reach the 40's. When patient gets out of bed to the chair, HR increases to 150/160's\"     Read at 11:54 am.     No new orders placed at this time. Will continue to monitor.

## 2020-08-11 NOTE — PROGRESS NOTES
[facility]  Progress Note    Myra Morgan  MRN: [id number]  Date: 8/11/2020    Reason for F/U :   COVID-19 virus pneumonia    Impression :   1. Acute hypoxic respiratory failure on supplemental oxygen  2. COVID-19 virus pneumonia  3. Non-ST elevation myocardial infarction type II  4. Sepsis/hypotension - resolved off pressors  5. Acute kidney injury on questionable chronic kidney disease  6. Old T4 compression deformity  7. Diverticulitis by CT imaging    Recommendations:   · She continues on 1-1/2 L by nasal cannula but remains somewhat tachypneic  · The patient did receive 2 units of plasma 8/7/2020  · She did not qualify for Remdisivir or any of the other study protocol  · She continues on ciprofloxacin and metronidazole for diverticulitis noted on imaging though clinically she is doing well  · Continue supportive therapy    Infection Control Recommendations:   Droplet plus precautions    Discharge Planning:   Estimated Length of IV antimicrobials: 7 to 10 days  Patient will need Midline Catheter Insertion/ PICC line Insertion: No  Patient will need: Home IV , [place],  SNF,  LTAC: Undetermined  Patient willneed outpatient wound care: No    MedicalDecision making / Summary of Stay:   Myra Morgan is a 80y.o.-year-old female who was initially admitted on 8/6/2020. Taj Pizarro resides in a nursing home and had a syncopal episode in the bathroom with incontinence of stool. She was found between the bathroom wall and the toilet. The episode was unwitnessed and the patient does not recall what happened. She presented with neck pain/knee pain/suprapubic discomfort and was found to be hypotensive by EMS, started on dopamine. The patient did have lines placed in the emergency department and work-up showed elevated lactic acid, BNP, troponin, BUN and creatinine. COVID testing was positive but the patient was not found to be hypoxic.   CT imaging of the chest done did show some groundglass opacities in the peribronchial region. The patient did have an old T4 compression fracture on CT imaging of the spine. The patient was started on Zosyn and vancomycin for sepsis and admitted to the [facility] unit we have been asked to evaluate and help with management    Current evaluation:2020    BP (!) 126/59   Pulse 115   Temp 99.7 °F (37.6 °C) (Bladder)   Resp (!) 34   Ht 5' 3\" (1.6 m)   Wt 169 lb 12.1 oz (77 kg)   SpO2 91%   BMI 30.07 kg/m²     Temperature Range: Temp: 99.7 °F (37.6 °C) Temp  Av.7 °F (37.1 °C)  Min: 97.5 °F (36.4 °C)  Max: 99.9 °F (37.7 °C)  The patient is seen and evaluated at bedside she is awake and alert and somewhat tachypneic. She is on 1/2 L by nasal cannula. No subjective fever or chills. No abdominal pain nausea or vomiting. She reports she was able to ambulate and sit in the chair earlier today. Review of Systems   Constitutional: Negative. HENT: Positive for hearing loss. Respiratory: Positive for shortness of breath. Cardiovascular: Negative. Gastrointestinal: Negative. Genitourinary: Negative. Musculoskeletal: Negative. Skin: Negative. Neurological: Negative. Psychiatric/Behavioral: Negative. Physical Examination :     Physical Exam  Constitutional:       Appearance: She is well-developed. HENT:      Head: Normocephalic and atraumatic. Neck:      Musculoskeletal: Normal range of motion and neck supple. Cardiovascular:      Rate and Rhythm: Regular rhythm. Heart sounds: Normal heart sounds. Pulmonary:      Effort: Respiratory distress present. Breath sounds: Rales present. Abdominal:      General: Bowel sounds are normal.      Palpations: Abdomen is soft. Skin:     General: Skin is warm and dry. Neurological:      Mental Status: She is alert and oriented to person, place, and time.          Laboratory data:   I have independently reviewed the followinglabs:  CBC with Differential:   Recent Labs 08/10/20  0653 08/11/20  0651   WBC 7.7 9.8   HGB 9.5* 9.8*   HCT 29.2* 30.0*   * 161   LYMPHOPCT 5* 2*   MONOPCT 6 6     BMP:   Recent Labs     08/10/20  0653 08/11/20  0651    140   K 4.3 4.2   * 109*   CO2 20 20   BUN 31* 30*   CREATININE 1.28* 1.11*     Hepatic Function Panel:   Recent Labs     08/10/20  0653 08/11/20  0651   PROT 4.5* 4.6*   LABALBU 2.1* 2.1*   BILITOT 0.44 0.49   ALKPHOS 118* 138*   ALT 39* 33   AST 28 29     No results for input(s): VANCOTROUGH in the last 72 hours. Lab Results   Component Value Date    .9 (H) 08/11/2020     No results found for: SEDRATE    No results for input(s): PROCAL in the last 72 hours. Fibrinogen  558High    140 - 420 mg/dL  Final  08/11/2020  6:51 AM  [address]      Ferritin  619High    13 - 150 ug/L  Final  08/11/2020  6:51 AM  [address]          Imaging Studies:   ONE XRAY VIEW OF THE CHEST 8/10/2020 6:19 am    Impression    Pulmonary edema pattern has progressed compared to the previous exam.    Continued radiographic follow-up recommended to ensure clearance. Cultures:     Culture, Blood 1 [[id number]]   Collected: 08/06/20 1315    Order Status: Completed  Specimen: Blood  Updated: 08/11/20 0647     Specimen Description  . BLOOD     Special Requests  R FEMORAL 10ML     Culture  NO GROWTH 5 DAYS    Culture, Blood 1 [[id number]]   Collected: 08/06/20 1345    Order Status: Completed  Specimen: Blood  Updated: 08/11/20 0647     Specimen Description  . BLOOD     Special Requests  LAC 5ML     Culture  NO GROWTH 5 DAYS        Medications:      miconazole   Topical BID    folic acid  1 mg Oral Daily    atorvastatin  20 mg Oral Daily    [Held by provider] docusate sodium  100 mg Oral BID    ferrous sulfate  325 mg Oral BID    famotidine (PEPCID) injection  20 mg Intravenous Daily    ciprofloxacin  400 mg Intravenous Q24H    metroNIDAZOLE  500 mg Intravenous Q8H    sodium chloride flush  10 mL Intravenous 2 times per day    heparin (porcine)  5,000 Units Subcutaneous 3 times per day           [facility]  Bard Eldon GANNON  Perfect Serve messaging  OFFICE: [phone]      Electronically signed by Bard Eldon MD on 8/11/2020 at 12:30 PM  Thank you for allowing us to participate in the care of this patient. Please call with questions. This note iscreated with the assistance of a speech recognition program.  While intending to generate a document that actually reflects the content of the visit, the document can still have some errors including those of syntax andsound a like substitutions which may escape proof reading. In such instances, actual meaning can be extrapolated by contextual diversion.

## 2020-08-12 NOTE — PROGRESS NOTES
Physical Therapy  Facility/Department: [facility]  Daily Treatment Note  NAME: Remington Bishop  : 10/4/1923  MRN: [id number]    Date of Service: 2020    Discharge Recommendations:  Patient would benefit from continued therapy after discharge   PT Equipment Recommendations  Equipment Needed: No    Assessment   Body structures, Functions, Activity limitations: Decreased functional mobility ; Decreased balance;Decreased endurance;Decreased safe awareness;Decreased ROM; Decreased strength  Assessment: Treatment limited this date as pt unable to sit EOB, due to respiratory status. Recommend continued PT to [facility] safety and progress toward prior level of I. Prognosis: Good  REQUIRES PT FOLLOW UP: Yes  Activity Tolerance  Activity Tolerance: Patient limited by fatigue;Treatment limited secondary to medical complications (free text)  Activity Tolerance: On bi pap, destats will all mobility     Patient Diagnosis(es): The primary encounter diagnosis was COVID-19. Diagnoses of PRAFUL (acute kidney injury) ([address] Atrial fibrillation, unspecified type ([address] and Elevated troponin were also pertinent to this visit. has a past medical history of Chronic kidney disease, Constipated, Diabetes mellitus ([address] Falls, Heart failure ([address] [place] (hard of hearing), Hypertension, Hypothyroid, Muscle wasting, Muscle weakness, Patient in clinical research study, Pneumonia, and Sarcopenia. has no past surgical history on file. Restrictions  Restrictions/Precautions  Restrictions/Precautions: General Precautions, Fall Risk  Required Braces or Orthoses?: No  Position Activity Restriction  Other position/activity restrictions: up with assist  Subjective   General  Response To Previous Treatment: Patient with no complaints from previous session. Family / Caregiver Present: No  Subjective  Subjective: Pt resting in bed upon arrival with Bi pap and O2 donned, stating at 99%.  Agreeable to PT  General Comment  Comments: RN agreeable to bed ex this date. Deffered EOB due to respiratory issues  Pain Screening  Patient Currently in Pain: Denies  Vital Signs  Patient Currently in Pain: Denies       Orientation  Orientation  Overall Orientation Status: Within Functional Limits  Cognition      Objective   Bed mobility  Scooting: Moderate assistance  Comment: scooting and bed mobility with MOD A for proper positioning in bed to reduce skin breakdown, contracture, pneumonia, ect. Exercise  Supine Exercises: Ankle Pumps, Gluteal sets, Hamstring Sets, Heel Slides, Hip ABD/ADD, Hip IR/ER, Quad Sets, SAQ, SLR. Reps 15x  Upper extremity exercises: Bicep curl, shoulder flexion/extension, punches, tricep curl, shoulder abduction/adduction.  Reps: 15x, AAROM           Goals  Short term goals  Time Frame for Short term goals: 14 visits  Short term goal 1: Perform bed mobility and functional transfers with SBA  Short term goal 2: Ambulate 200ft with RW and SBA  Short term goal 3: Demo Fair+ dynamic standing balance to decrease risk of falls  Short term goal 4: Participate in 30 minutes of therapy to demo increased endurance    Plan    Plan  Times per week: 3-5x/wk  Current Treatment Recommendations: Strengthening, ROM, Balance Training, Functional Mobility Training, Transfer Training, Gait Training, Endurance Training, Home Exercise Program, Safety Education & Training, Patient/Caregiver Education & Training  Safety Devices  Type of devices: Nurse notified, Call light within reach, Gait belt, Left in bed, All fall risk precautions in place  Restraints  Initially in place: No     Therapy Time   Individual Concurrent Group Co-treatment   Time In 0952         Time Out 1024         Minutes 32         Timed Code Treatment Minutes: 4605 Yeyo Martell, PTA

## 2020-08-12 NOTE — PROGRESS NOTES
Message  NP  For  Attending  As follows: We are unable to get IV access even with ultrasound. Going to talk to CRNA and charge nurse. Pt left arm was infiltrated from earlier IV. Had to stop antibiotics and fluid. Further unknown if antibiotic went through, unable to use left arm. Right arm has multiple sticks and a second peripheral IV also went bad. Currently pt has no IV access at this time. What do you suggest?  Read 1:45 AM      8/12/20 1:46 AM   Well not much We can do. Ill place a consult for IV team in the AM   0  8/12/20 1:55 AM   Thank you. I will also speak to the doctor. Read 1:55 AM     8/12/20 1:56 AM   Unless he wants to start a central line? 8/12/20 1:56 AM   Let me know     8/12/20 1:57 AM   Ok. Thank you. I will let you know what he says.

## 2020-08-12 NOTE — PROGRESS NOTES
[facility]  Progress Note    Curtis Salvador  MRN: [id number]  Date: 8/12/2020    Reason for F/U :   COVID-19 virus pneumonia    Impression :   1. Acute hypoxic respiratory failure   · She required BiPAP overnight and is currently on 5 L by nasal cannula  2. COVID-19 virus pneumonia  3. Non-ST elevation myocardial infarction type II  4. Sepsis/hypotension - resolved off pressors  5. Acute kidney injury on questionable chronic kidney disease  6. Old T4 compression deformity  7. Diverticulitis by CT imaging    Recommendations:   · The patient did receive 2 units of plasma 8/7/2020  · She did not qualify for Remdisivir or any of the other study protocol  · The patient had worsening hypoxia/respiratory distress overnight and chest x-ray done was stable. · The patient use BiPAP overnight and is currently on 5 L by nasal cannula saturating 95%. · The primary team did discuss the care with the patient as well as family and at this point in time the CODE STATUS has been changed to DNR comfort care  · I will stop the ciprofloxacin and metronidazole at this time    Infection Control Recommendations:   Droplet plus precautions    Discharge Planning:   Patient will need Midline Catheter Insertion/ PICC line Insertion: No  Patient will need: Home IV , [place],  SNF,  LTAC: Undetermined  Patient willneed outpatient wound care: No    MedicalDecision making / Summary of Stay:   Curtis Salvador is a 80y.o.-year-old female who was initially admitted on 8/6/2020. Tito Moreland resides in a nursing home and had a syncopal episode in the bathroom with incontinence of stool. She was found between the bathroom wall and the toilet. The episode was unwitnessed and the patient does not recall what happened. She presented with neck pain/knee pain/suprapubic discomfort and was found to be hypotensive by EMS, started on dopamine.     The patient did have lines placed in the emergency department and work-up showed elevated lactic acid, BNP, troponin, BUN and creatinine. COVID testing was positive but the patient was not found to be hypoxic. CT imaging of the chest done did show some groundglass opacities in the peribronchial region. The patient did have an old T4 compression fracture on CT imaging of the spine. The patient was started on Zosyn and vancomycin for sepsis and admitted to the [facility] unit we have been asked to evaluate and help with management    Current evaluation:2020    BP (!) 120/59   Pulse 99   Temp 97.6 °F (36.4 °C) (Oral)   Resp (!) 31   Ht 5' 3\" (1.6 m)   Wt 169 lb 12.1 oz (77 kg)   SpO2 98%   BMI 30.07 kg/m²     Temperature Range: Temp: 97.6 °F (36.4 °C) Temp  Av.9 °F (36.6 °C)  Min: 97.6 °F (36.4 °C)  Max: 98.3 °F (36.8 °C)  The patient is seen and evaluated through the window and the care was discussed with the nurse. It was difficult to try to talk to the patient via the phone as she is very hard of hearing. Events from overnight were noted and the patient did have some respiratory distress requiring BiPAP. The patient has refused further BiPAP and CODE STATUS has been changed to DNR comfort care    Review of Systems   Unable to perform ROS: Other       Physical Examination :     Due to the current efforts to prevent transmission of COVID-19 and also the need to preserve PPE for other caregivers, a face-to-face encounter with the patient was not performed. That being said, all relevant records and diagnostic tests were reviewed, including laboratory results and imaging. Please reference any relevant documentation elsewhere. Care will be coordinated with the primary service.       Laboratory data:   I have independently reviewed the followinglabs:  CBC with Differential:   Recent Labs     20  0651 20  0152   WBC 9.8 9.6   HGB 9.8* 10.4*   HCT 30.0* 31.9*    174   LYMPHOPCT 2* 1*   MONOPCT 6 3     BMP:   Recent Labs     20  0651 20  0152    138   K 4.2 4.3   CL 109* 110*   CO2 20 20   BUN 30* 33*   CREATININE 1.11* 1.08*     Hepatic Function Panel:   Recent Labs     08/11/20  0651 08/12/20  0152   PROT 4.6* 4.6*   LABALBU 2.1* 2.0*   BILITOT 0.49 0.41   ALKPHOS 138* 158*   ALT 33 30   AST 29 30     No results for input(s): VANCOTROUGH in the last 72 hours. Lab Results   Component Value Date    .9 (H) 08/11/2020     No results found for: SEDRATE    No results for input(s): PROCAL in the last 72 hours. Fibrinogen  558High    140 - 420 mg/dL  Final  08/11/2020  6:51 AM  [address]      Ferritin  619High    13 - 150 ug/L  Final  08/11/2020  6:51 AM  [address]          Imaging Studies:   ONE XRAY VIEW OF THE CHEST 8/11/2020 11:22 pm   Impression    No significant interval change with redemonstration of diffuse bilateral    heterogeneous opacities. Cultures:     Culture, Blood 1 [[id number]]   Collected: 08/06/20 1345    Order Status: Completed  Specimen: Blood  Updated: 08/12/20 0833     Specimen Description  . BLOOD     Special Requests  LAC 5ML     Culture  NO GROWTH 6 DAYS    Culture, Blood 1 [[id number]]   Collected: 08/06/20 1315    Order Status: Completed  Specimen: Blood  Updated: 08/12/20 0833     Specimen Description  . BLOOD     Special Requests  R FEMORAL 10ML     Culture  NO GROWTH 6 DAYS        Medications:      ciprofloxacin  250 mg Oral 2 times per day    metroNIDAZOLE  500 mg Oral 3 times per day    miconazole   Topical BID    folic acid  1 mg Oral Daily    atorvastatin  20 mg Oral Daily    [Held by provider] docusate sodium  100 mg Oral BID    ferrous sulfate  325 mg Oral BID    sodium chloride flush  10 mL Intravenous 2 times per day    heparin (porcine)  5,000 Units Subcutaneous 3 times per day           [facility]  Alexis Farrell MD  Perfect Serve messaging  OFFICE: [phone]      Electronically signed by Alexis Farrell MD on 8/12/2020 at 3:28 PM  Thank you for allowing us to participate in the care of this patient. Please call with questions. This note iscreated with the assistance of a speech recognition program.  While intending to generate a document that actually reflects the content of the visit, the document can still have some errors including those of syntax andsound a like substitutions which may escape proof reading. In such instances, actual meaning can be extrapolated by contextual diversion.

## 2020-08-12 NOTE — PLAN OF CARE
Problem: Airway Clearance - Ineffective  Goal: Achieve or maintain patent airway  8/12/2020 0625 by Wilfrido Dodge RN  Outcome: Ongoing  8/11/2020 1833 by Sd Steele RN  Outcome: Ongoing     Problem: Gas Exchange - Impaired  Goal: Absence of hypoxia  8/12/2020 0625 by Wilfrido Dodge RN  Outcome: Ongoing  8/11/2020 1833 by Sd Steele RN  Outcome: Ongoing  Goal: Promote optimal lung function  8/12/2020 0625 by Wilfrido Dodge RN  Outcome: Ongoing  8/11/2020 1833 by Sd Steele RN  Outcome: Ongoing     Problem: Breathing Pattern - Ineffective  Goal: Ability to achieve and maintain a regular respiratory rate  8/12/2020 0625 by Wilfrido Dodge RN  Outcome: Ongoing  8/11/2020 1833 by Sd Steele RN  Outcome: Ongoing     Problem:  Body Temperature -  Risk of, Imbalanced  Goal: Ability to maintain a body temperature within defined limits  8/12/2020 0625 by Wilfrido Dodge RN  Outcome: Ongoing  8/11/2020 1833 by Sd Steele RN  Outcome: Ongoing  Goal: Will regain or maintain usual level of consciousness  8/12/2020 0625 by Wilfrido Dodge RN  Outcome: Ongoing  8/11/2020 1833 by Sd Steele RN  Outcome: Ongoing  Goal: Complications related to the disease process, condition or treatment will be avoided or minimized  8/12/2020 0625 by Wilfrido Dodge RN  Outcome: Ongoing  8/11/2020 1833 by Sd Steele RN  Outcome: Ongoing     Problem: Isolation Precautions - Risk of Spread of Infection  Goal: Prevent transmission of infection  8/12/2020 0625 by Wilfrido Dodge RN  Outcome: Ongoing  8/11/2020 1833 by Sd Steele RN  Outcome: Ongoing     Problem: Nutrition Deficits  Goal: Optimize nutrtional status  8/12/2020 0625 by Wilfrido Dodge RN  Outcome: Ongoing  8/11/2020 1833 by Sd Steele RN  Outcome: Ongoing     Problem: Risk for Fluid Volume Deficit  Goal: Maintain normal heart rhythm  8/12/2020 0625 by Wilfrido Dodge RN  Outcome: Ongoing  8/11/2020 1833 by Sd Steele RN  Outcome: Ongoing  Goal: Maintain absence of muscle cramping  8/12/2020 0625 by Travis Mohr RN  Outcome: Ongoing  8/11/2020 1833 by Maura Perry RN  Outcome: Ongoing  Goal: Maintain normal serum potassium, sodium, calcium, phosphorus, and pH  8/12/2020 0625 by Travis Mohr RN  Outcome: Ongoing  8/11/2020 1833 by Maura Perry RN  Outcome: Ongoing     Problem: Loneliness or Risk for Loneliness  Goal: Demonstrate positive use of time alone when socialization is not possible  8/12/2020 0625 by Travis Mohr RN  Outcome: Ongoing  8/11/2020 1833 by Maura Perry RN  Outcome: Ongoing     Problem: Fatigue  Goal: Verbalize increase energy and improved vitality  8/12/2020 0625 by Travis Mohr RN  Outcome: Ongoing  8/11/2020 1833 by Maura Perry RN  Outcome: Ongoing     Problem: Patient Education: Go to Patient Education Activity  Goal: Patient/Family Education  8/12/2020 0935 by Travis Mohr RN  Outcome: Ongoing  8/11/2020 1833 by Maura Perry RN  Outcome: Ongoing     Problem: Pain:  Goal: Pain level will decrease  Description: Pain level will decrease  8/12/2020 0625 by Travis Mohr RN  Outcome: Ongoing  8/11/2020 1833 by Maura Perry RN  Outcome: Ongoing  Goal: Control of acute pain  Description: Control of acute pain  8/12/2020 0625 by Travis Mohr RN  Outcome: Ongoing  8/11/2020 1833 by Maura Perry RN  Outcome: Ongoing  Goal: Control of chronic pain  Description: Control of chronic pain  8/12/2020 0625 by Travis Mohr RN  Outcome: Ongoing  8/11/2020 1833 by Maura Perry RN  Outcome: Ongoing     Problem:  Bowel Function - Altered:  Goal: Bowel elimination is within specified parameters  Description: Bowel elimination is within specified parameters  8/12/2020 0625 by Travis Mohr RN  Outcome: Ongoing  8/11/2020 1833 by Maura Perry RN  Outcome: Ongoing     Problem: Fluid Volume - Imbalance:  Goal: Absence of imbalanced fluid volume signs and symptoms  Description: Absence of imbalanced fluid volume signs and symptoms  8/12/2020 0625 by Travis Mohr RN  Outcome: Ongoing  8/11/2020 1833 by Kiki Laughlin RN  Outcome: Ongoing     Problem: Gas Exchange - Impaired:  Goal: Levels of oxygenation will improve  Description: Levels of oxygenation will improve  8/12/2020 0625 by Mariia Martell RN  Outcome: Ongoing  8/11/2020 1833 by Kiki Laughlin RN  Outcome: Ongoing     Problem: Nutrition Deficit:  Goal: Ability to achieve adequate nutritional intake will improve  Description: Ability to achieve adequate nutritional intake will improve  8/12/2020 0625 by Mariia Martell RN  Outcome: Ongoing  8/11/2020 1833 by Kiki Laughlin RN  Outcome: Ongoing     Problem: Skin Integrity - Impaired:  Goal: Will show no infection signs and symptoms  Description: Will show no infection signs and symptoms  8/12/2020 0625 by Mariia Martell RN  Outcome: Ongoing  8/11/2020 1833 by Kiki Laughlin RN  Outcome: Ongoing  Goal: Absence of new skin breakdown  Description: Absence of new skin breakdown  8/12/2020 0625 by Mariia Martell RN  Outcome: Ongoing  8/11/2020 1833 by Kiki Laughlin RN  Outcome: Ongoing     Problem: Tissue Perfusion - Cardiopulmonary, Altered:  Goal: Absence of angina  Description: Absence of angina  8/12/2020 0625 by Mariia Martell RN  Outcome: Ongoing  8/11/2020 1833 by Kiki Laughlin RN  Outcome: Ongoing  Goal: Hemodynamic stability will improve  Description: Hemodynamic stability will improve  8/12/2020 0625 by Mariia Martell RN  Outcome: Ongoing  8/11/2020 1833 by Kiki Laughlin RN  Outcome: Ongoing     Problem: Nutrition  Goal: Optimal nutrition therapy  Description: Nutrition Problem #1: Increased nutrient needs  Intervention: Food and/or Nutrient Delivery: Continue Current Diet, Start Oral Nutrition Supplement  Nutritional Goals: Pt to consume >75% of est'd needs via PO     8/12/2020 0625 by Mariia Martell RN  Outcome: Ongoing  8/11/2020 1833 by Kiki Laughlin RN  Outcome: Ongoing     Problem: Skin Integrity:  Goal: Will show no infection signs and symptoms  Description: Will show

## 2020-08-12 NOTE — PROGRESS NOTES
diverticulitis\"     Received convalescent plasma    Review of Systems:     unobtainable      Medications: Allergies:  No Known Allergies    Current Meds:   Scheduled Meds:    miconazole   Topical BID    folic acid  1 mg Oral Daily    atorvastatin  20 mg Oral Daily    [Held by provider] docusate sodium  100 mg Oral BID    ferrous sulfate  325 mg Oral BID    famotidine (PEPCID) injection  20 mg Intravenous Daily    ciprofloxacin  400 mg Intravenous Q24H    metroNIDAZOLE  500 mg Intravenous Q8H    sodium chloride flush  10 mL Intravenous 2 times per day    heparin (porcine)  5,000 Units Subcutaneous 3 times per day     Continuous Infusions:    dextrose       PRN Meds: diphenhydrAMINE, dextrose, dextrose, sodium chloride flush, acetaminophen **OR** acetaminophen, polyethylene glycol, promethazine **OR** ondansetron    Data:     Past Medical History:   has a past medical history of Chronic kidney disease, Constipated, Diabetes mellitus ([address] Falls, Heart failure ([address] [place] (hard of hearing), Hypertension, Hypothyroid, Muscle wasting, Muscle weakness, Patient in clinical research study, Pneumonia, and Sarcopenia. Social History:   reports that she has never smoked. She has never used smokeless tobacco. She reports previous alcohol use. She reports previous drug use. Family History: History reviewed. No pertinent family history. Vitals:  BP (!) 120/59   Pulse 99   Temp 97.6 °F (36.4 °C) (Oral)   Resp (!) 31   Ht 5' 3\" (1.6 m)   Wt 169 lb 12.1 oz (77 kg)   SpO2 98%   BMI 30.07 kg/m²   Temp (24hrs), Av.4 °F (36.9 °C), Min:97.6 °F (36.4 °C), Max:99.7 °F (37.6 °C)    Recent Labs     20  2018 08/10/20  0955 08/10/20  1235 20  2305   POCGLU 95 74 136* 158*       I/O (24Hr):     Intake/Output Summary (Last 24 hours) at 2020 1118  Last data filed at 2020 0100  Gross per 24 hour   Intake --   Output 520 ml   Net -520 ml       Labs:  Hematology:  Recent Labs 08/10/20  0653 08/11/20  0651 08/12/20  0152   WBC 7.7 9.8 9.6   RBC 2.90* 3.02* 3.19*   HGB 9.5* 9.8* 10.4*   HCT 29.2* 30.0* 31.9*   .7 99.3 100.0   MCH 32.8 32.5 32.6   MCHC 32.5 32.7 32.6   RDW 14.2 14.1 14.4   * 161 174   MPV 11.9 11.3 11.2   CRP  --  186.9*  --      Chemistry:  Recent Labs     08/10/20  0653 08/11/20  0651 08/12/20  0152    140 138   K 4.3 4.2 4.3   * 109* 110*   CO2 20 20 20   GLUCOSE 86 106* 150*   BUN 31* 30* 33*   CREATININE 1.28* 1.11* 1.08*   ANIONGAP 11 11 8*   LABGLOM 39* 46* 47*   GFRAA 47* 55* 57*   CALCIUM 8.8 9.0 9.1   PROBNP  --  8,944*  --      Recent Labs     08/09/20  1216 08/09/20  1721 08/09/20  2018 08/10/20  0653 08/10/20  0955 08/10/20  1235 08/11/20  0651 08/11/20  2305 08/12/20  0152   PROT  --   --   --  4.5*  --   --  4.6*  --  4.6*   LABALBU  --   --   --  2.1*  --   --  2.1*  --  2.0*   AST  --   --   --  28  --   --  29  --  30   ALT  --   --   --  39*  --   --  33  --  30   ALKPHOS  --   --   --  118*  --   --  138*  --  158*   BILITOT  --   --   --  0.44  --   --  0.49  --  0.41   POCGLU 128* 111* 95  --  74 136*  --  158*  --      ABG:  Lab Results   Component Value Date    POCPH 7.409 08/11/2020    POCPCO2 32.4 08/11/2020    POCPO2 47.6 08/11/2020    POCHCO3 20.5 08/11/2020    NBEA 4 08/11/2020    PBEA NOT REPORTED 08/11/2020    PJV6WGP 22 08/11/2020    IBPB9VEW 84 08/11/2020    FIO2 NOT REPORTED 08/11/2020     Lab Results   Component Value Date/Time    SPECIAL LAC 5ML 08/06/2020 01:45 PM     Lab Results   Component Value Date/Time    CULTURE NO GROWTH 6 DAYS 08/06/2020 01:45 PM       Radiology:  Xr Chest (single View Frontal)    Result Date: 8/10/2020  Pulmonary edema pattern has progressed compared to the previous exam. Continued radiographic follow-up recommended to ensure clearance. Xr Knee Left (3 Views)    Result Date: 8/6/2020  No fracture dislocation left knee s/p TKR.      Ct Head Wo Contrast    Result Date: 8/6/2020  No acute intracranial abnormality. Chronic appearing paranasal sinus disease. Bilateral mastoiditis. Ct Cervical Spine Wo Contrast    Result Date: 8/6/2020  No acute abnormality of the cervical spine. Probable old compression deformity T4; correlate clinically. Osteopenia, multilevel degenerative changes and additional findings, as described in detail above. Ct Thoracic Spine Wo Contrast    Result Date: 8/6/2020  Chronic appearing moderate-severe compression deformity T4. An acute element should be considered if there is localized tenderness at this level. No additional thoracic spine fracture identified. Mild-moderate multilevel degenerative and additional findings, as above RECOMMENDATIONS: MRI could be obtained to assess for possible edema, if indicated clinically. Ct Lumbar Spine Wo Contrast    Result Date: 8/6/2020  No acute lumbar spine fracture. Multilevel degenerative changes/DDD, as described. Xr Chest Portable    Result Date: 8/12/2020  No significant interval change with redemonstration of diffuse bilateral heterogeneous opacities. Xr Chest Portable    Result Date: 8/6/2020  Patchy airspace and interstitial findings, better demonstrated on CT. Correlate for edema versus pneumonia. Ct Chest Abdomen Pelvis Wo Contrast    Result Date: 8/7/2020  1. Patchy ground-glass opacification within the lungs, predominantly peribronchial in distribution, possibly mild edema or infiltrate. 2. Atherosclerotic calcification in the aorta and coronary circulation. No aneurysm. 3. Uncomplicated diverticulitis involving the proximal sigmoid colon. No evidence of perforation or abscess. No free fluid. 4. 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 5. T5 compression fracture. See CT of the thoracic and lumbar spine for complete details.        Physical Examination:        General appearance:  alert, cooperative and looks more ill  Mental Status: Cannot assess    Lungs:  clear to auscultation bilaterally, labored effort on bipap  Heart:  Tachy, irreg rhythm, no murmur  Abdomen:  soft, nontender, nondistended, normal bowel sounds, no masses, hepatomegaly, splenomegaly  Extremities:  no edema, redness, tenderness in the calves  Skin:  no gross lesions, rashes, induration    Assessment:        Hospital Problems           Last Modified POA    * (Principal) Septic shock ([facility] 8/7/2020 Yes    Diverticulitis 8/7/2020 Yes    COVID-19 with multiple comorbidities 8/7/2020 Yes    Hypothyroid 8/7/2020 Yes    Essential hypertension 8/9/2020 Yes    [place] (hard of hearing) 8/7/2020 Yes    Heart failure ([address] 8/7/2020 Yes    Falls 8/7/2020 Yes    Controlled type 2 diabetes mellitus with complication, without long-term current use of insulin ([facility] 8/9/2020 Yes    Chronic kidney disease 8/7/2020 Yes    Pneumonia due to COVID-19 virus 8/7/2020 Yes    Transaminitis 8/8/2020 Yes          Plan:        1. Called and d/w daughter, [employer] who is hcpoa. All possible code status options explained to her and she would like dnrcc, nothing invasive. 20 minutes spent in code status discussion. We will set up Zoom call with her later today and leave bipap off after that. No need for central line, will use alternate methods of comfort meds  2. Daughter very appreciative of all we have done  3.  Will not be intubating due to dnrcc now    Ari Ask Blood, DO  8/12/2020  11:18 AM

## 2020-08-12 NOTE — PROGRESS NOTES
[facility]  Occupational Therapy Not Seen Note    DATE: 2020  Name: Gm Suh  : 10/4/1923  MRN: [id number]    Patient not available for Occupational Therapy due to:    [] Testing:    [] Hemodialysis    [] Blood Transfusion in Progress    []Refusal by Patient:    [] Surgery/Procedure:    [] Strict Bedrest    [] Sedation    [] Spine Precautions     [] Pt being transferred to palliative care at this time. Spoke with pt/family and OT services to be defered. [] Pt independent with functional mobility and functional tasks.  Pt with no OT acute care needs at this time, will defer OT eval.    [x] Other: Pt requiring full BiPAP at this time, ck in pm if respiratory status improves    Next Scheduled Treatment: Ck in pm or     Edison Wong OTR/L

## 2020-08-12 NOTE — FLOWSHEET NOTE
Assessment: Family requested a video/Zoom call. Daughter wants to video visit with her mother, and is mildly anxious about it. Intervention:  provided emotional support and coordinated Zoom visit. Outcome and Plan: Zoom call scheduled tentatively for 8/13 at 10am.  Follow as needed/requested.        08/12/20 2025   Encounter Summary   Services provided to: Family   Referral/Consult From: Nurse   Continue Visiting   (8/12/2020)   Complexity of Encounter Moderate   Length of Encounter 15 minutes   Spiritual Assessment Completed Yes   Routine   Type Initial   Assessment Approachable   Intervention Sustaining presence/ Ministry of presence   Outcome Engaged in conversation;Expressed gratitude

## 2020-08-12 NOTE — PLAN OF CARE
Problem: Airway Clearance - Ineffective  Goal: Achieve or maintain patent airway  8/12/2020 1830 by Yo Treadwell RN  Outcome: Ongoing  8/12/2020 1132 by Debi Cifuentes RCP  Outcome: Ongoing  8/12/2020 0625 by Darra Scheuermann, RN  Outcome: Ongoing     Problem: Gas Exchange - Impaired  Goal: Absence of hypoxia  8/12/2020 1830 by Yo Treadwell RN  Outcome: Ongoing  8/12/2020 1132 by Debi Cifuentes RCP  Outcome: Ongoing  8/12/2020 0625 by Darra Scheuermann, RN  Outcome: Ongoing  Goal: Promote optimal lung function  8/12/2020 1830 by Yo Treadwell RN  Outcome: Ongoing  8/12/2020 1132 by Debi Cifuentes RCP  Outcome: Ongoing  8/12/2020 0625 by Darra Scheuermann, RN  Outcome: Ongoing     Problem: Breathing Pattern - Ineffective  Goal: Ability to achieve and maintain a regular respiratory rate  8/12/2020 1830 by Yo Treadwell RN  Outcome: Ongoing  8/12/2020 0625 by Darra Scheuermann, RN  Outcome: Ongoing     Problem:  Body Temperature -  Risk of, Imbalanced  Goal: Ability to maintain a body temperature within defined limits  8/12/2020 1830 by Yo Treadwell RN  Outcome: Ongoing  8/12/2020 0625 by Darra Scheuermann, RN  Outcome: Ongoing  Goal: Will regain or maintain usual level of consciousness  8/12/2020 1830 by Yo Treadwell RN  Outcome: Ongoing  8/12/2020 0625 by Darra Scheuermann, RN  Outcome: Ongoing  Goal: Complications related to the disease process, condition or treatment will be avoided or minimized  8/12/2020 1830 by Yo Treadwell RN  Outcome: Ongoing  8/12/2020 0625 by Darra Scheuermann, RN  Outcome: Ongoing     Problem: Isolation Precautions - Risk of Spread of Infection  Goal: Prevent transmission of infection  8/12/2020 1830 by Yo Treadwell RN  Outcome: Ongoing  8/12/2020 0625 by Darra Scheuermann, RN  Outcome: Ongoing     Problem: Nutrition Deficits  Goal: Optimize nutrtional status  8/12/2020 1830 by Yo Treadwell RN  Outcome: Ongoing  8/12/2020 0625 by Darra Scheuermann, RN  Outcome: Ongoing     Problem: Risk for Fluid Volume Deficit  Goal: Maintain normal heart rhythm  8/12/2020 1830 by Melodie Hill RN  Outcome: Ongoing  8/12/2020 0625 by Mariia Martell RN  Outcome: Ongoing  Goal: Maintain absence of muscle cramping  8/12/2020 1830 by Melodie Hill RN  Outcome: Ongoing  8/12/2020 0625 by Mariia Martell RN  Outcome: Ongoing  Goal: Maintain normal serum potassium, sodium, calcium, phosphorus, and pH  8/12/2020 1830 by Melodie Hill RN  Outcome: Ongoing  8/12/2020 0625 by Mariia Martell RN  Outcome: Ongoing     Problem: Loneliness or Risk for Loneliness  Goal: Demonstrate positive use of time alone when socialization is not possible  8/12/2020 1830 by Melodie Hill RN  Outcome: Ongoing  8/12/2020 0625 by Mariia Martell RN  Outcome: Ongoing     Problem: Fatigue  Goal: Verbalize increase energy and improved vitality  8/12/2020 1830 by Melodie Hill RN  Outcome: Ongoing  8/12/2020 0625 by Mariia Martell RN  Outcome: Ongoing     Problem: Patient Education: Go to Patient Education Activity  Goal: Patient/Family Education  8/12/2020 1830 by Melodie Hill RN  Outcome: Ongoing  8/12/2020 0625 by Mariia Martell RN  Outcome: Ongoing     Problem: Pain:  Goal: Pain level will decrease  Description: Pain level will decrease  8/12/2020 1830 by Melodie Hill RN  Outcome: Ongoing  8/12/2020 0625 by Mariia Martell RN  Outcome: Ongoing  Goal: Control of acute pain  Description: Control of acute pain  8/12/2020 1830 by Melodie Hill RN  Outcome: Ongoing  8/12/2020 0625 by Mariia Martell RN  Outcome: Ongoing  Goal: Control of chronic pain  Description: Control of chronic pain  8/12/2020 1830 by Melodie Hill RN  Outcome: Ongoing  8/12/2020 0625 by Mariia Martell RN  Outcome: Ongoing     Problem:  Bowel Function - Altered:  Goal: Bowel elimination is within specified parameters  Description: Bowel elimination is within specified parameters  8/12/2020 1830 by Melodie Hill RN  Outcome: Ongoing  8/12/2020 0625 by Mariia Martell RN  Outcome: Ongoing Problem: Fluid Volume - Imbalance:  Goal: Absence of imbalanced fluid volume signs and symptoms  Description: Absence of imbalanced fluid volume signs and symptoms  8/12/2020 1830 by Kayleigh Mcclure RN  Outcome: Ongoing  8/12/2020 0625 by Ramon Montoya RN  Outcome: Ongoing     Problem: Gas Exchange - Impaired:  Goal: Levels of oxygenation will improve  Description: Levels of oxygenation will improve  8/12/2020 1830 by Kayleigh Mcclure RN  Outcome: Ongoing  8/12/2020 0625 by Ramon Montoya RN  Outcome: Ongoing     Problem: Nutrition Deficit:  Goal: Ability to achieve adequate nutritional intake will improve  Description: Ability to achieve adequate nutritional intake will improve  8/12/2020 1830 by Kayleigh Mcclure RN  Outcome: Ongoing  8/12/2020 0625 by Ramon Montoya RN  Outcome: Ongoing     Problem: Skin Integrity - Impaired:  Goal: Will show no infection signs and symptoms  Description: Will show no infection signs and symptoms  8/12/2020 1830 by Kayleigh Mcclure RN  Outcome: Ongoing  8/12/2020 0625 by Ramon Montoya RN  Outcome: Ongoing  Goal: Absence of new skin breakdown  Description: Absence of new skin breakdown  8/12/2020 1830 by Kayleigh Mcclure RN  Outcome: Ongoing  8/12/2020 0625 by Ramon Montoya RN  Outcome: Ongoing     Problem: Tissue Perfusion - Cardiopulmonary, Altered:  Goal: Absence of angina  Description: Absence of angina  8/12/2020 1830 by Kayleigh Mcclure RN  Outcome: Ongoing  8/12/2020 0625 by Ramon Montoya RN  Outcome: Ongoing  Goal: Hemodynamic stability will improve  Description: Hemodynamic stability will improve  8/12/2020 1830 by Kayleigh Mcclure RN  Outcome: Ongoing  8/12/2020 0625 by Ramon Montoya RN  Outcome: Ongoing     Problem: Nutrition  Goal: Optimal nutrition therapy  Description: Nutrition Problem #1: Increased nutrient needs  Intervention: Food and/or Nutrient Delivery: Continue Current Diet, Start Oral Nutrition Supplement  Nutritional Goals: Pt to consume >75% of est'd needs via PO     8/12/2020 1830 by Lieutenant Mauro RN  Outcome: Ongoing  8/12/2020 0625 by Estee Vazquez RN  Outcome: Ongoing     Problem: Skin Integrity:  Goal: Will show no infection signs and symptoms  Description: Will show no infection signs and symptoms  8/12/2020 1830 by Lieutenant Mauro RN  Outcome: Ongoing  8/12/2020 1132 by Nick Kearney RCP  Outcome: Ongoing  8/12/2020 0625 by Estee Vazquez RN  Outcome: Ongoing  Goal: Absence of new skin breakdown  Description: Absence of new skin breakdown  8/12/2020 1830 by Lieutenant Mauro RN  Outcome: Ongoing  8/12/2020 1132 by Nick Kearney RCP  Outcome: Ongoing  8/12/2020 0625 by Estee Vazquez RN  Outcome: Ongoing     Problem: Falls - Risk of:  Goal: Will remain free from falls  Description: Will remain free from falls  8/12/2020 1830 by Lieutenant Mauro RN  Outcome: Ongoing  8/12/2020 0625 by Estee Vazquez RN  Outcome: Ongoing  Goal: Absence of physical injury  Description: Absence of physical injury  8/12/2020 1830 by Lieutenant Mauro RN  Outcome: Ongoing  8/12/2020 0625 by Estee Vazquez RN  Outcome: Ongoing

## 2020-08-13 NOTE — PROGRESS NOTES
[facility]    Progress Note    8/13/2020    12:10 PM    Name:   Lindy Teresa  MRN:     [id number]     Acct:      [de-identified]   [facility]   [facility] Day:  7  Admit Date:  8/6/2020 12:15 PM    PCP:   Ian Chowdary MD  Code Status:  DNR-CC    Subjective:     C/C:   Chief Complaint   Patient presents with    Loss of Consciousness     had syncopal episode on bathroom, incontinent of bowels. neck pain. low bp, dopamine started by ems. shut off by arrival     Interval History Status: worsened. ongoing resp insufficiency today  [place]    bipap removed yesterday    Family had ZOOM call this am with her: apparently she did not speak and barely opened eyes but daughter thought she knew they were on the call    RR remains high    Brief History:     Per my associate     Mary Romo a 80 y.o. Declined female who presents with Loss of Consciousness (had syncopal episode on bathroom, incontinent of bowels. neck pain. low bp, dopamine started by ems.  shut off by arrival)   and is admitted to the hospital for the management of COVID-19 with multiple comorbidities.  She has a hx of CHF and CKD.       Pt resides in a nursing home. Alisson Nur was found between the bathroom wall and toliet.  This was a presumed syncopal event.  She does not know what happened and her only complaints are of neck pain, knee pain and suprapubic discomfort.  In the ED she was found to be hypotensive and required a rt femoral central line.  Lactic acid was elevated as was BNP, troponin and BUN/Creat.   In addition her Covid was + but she was not hypoxic.  CT chest showed ground glass opacities in the peribronchial region.  CT of C-T-L spine revealed old T4 compression fx.  Left knee films were neg.       Blood and urine cultures were sent.  Pt received Vanco and Zosyn.       Vital signs have stabilized and pt is admitted to ICU for continued management.       - Off Levo  - On Cipro/flagyl for diverticulitis\"     Received convalescent plasma    Review of Systems:     unobtainable      Medications: Allergies:  No Known Allergies    Current Meds:   Scheduled Meds:    miconazole   Topical BID    folic acid  1 mg Oral Daily    atorvastatin  20 mg Oral Daily    [Held by provider] docusate sodium  100 mg Oral BID    ferrous sulfate  325 mg Oral BID    sodium chloride flush  10 mL Intravenous 2 times per day    heparin (porcine)  5,000 Units Subcutaneous 3 times per day     Continuous Infusions:    dextrose       PRN Meds: diphenhydrAMINE, morphine, LORazepam, dextrose, dextrose, sodium chloride flush, acetaminophen **OR** acetaminophen, polyethylene glycol, promethazine **OR** ondansetron    Data:     Past Medical History:   has a past medical history of Chronic kidney disease, Constipated, Diabetes mellitus ([address] Falls, Heart failure ([address] [place] (hard of hearing), Hypertension, Hypothyroid, Muscle wasting, Muscle weakness, Patient in clinical research study, Pneumonia, and Sarcopenia. Social History:   reports that she has never smoked. She has never used smokeless tobacco. She reports previous alcohol use. She reports previous drug use. Family History: History reviewed. No pertinent family history. Vitals:  BP (!) 116/47   Pulse 91   Temp 97.9 °F (36.6 °C) (Core)   Resp 26   Ht 5' 3\" (1.6 m)   Wt 169 lb 12.1 oz (77 kg)   SpO2 98%   BMI 30.07 kg/m²   Temp (24hrs), Av °F (36.7 °C), Min:97.9 °F (36.6 °C), Max:98.1 °F (36.7 °C)    Recent Labs     08/10/20  1235 20  2305   POCGLU 136* 158*       I/O (24Hr):     Intake/Output Summary (Last 24 hours) at 2020 1210  Last data filed at 2020 0641  Gross per 24 hour   Intake --   Output 1450 ml   Net -1450 ml       Labs:  Hematology:  Recent Labs     20  0651 20  0152   WBC 9.8 9.6   RBC 3.02* 3.19*   HGB 9.8* 10.4*   HCT 30.0* 31.9*   MCV 99.3 100.0   MCH 32.5 32.6   MCHC 32.7 32.6   RDW 14.1 14.4    174   MPV 11.3 11.2   .9*  --      Chemistry:  Recent Labs     08/11/20  0651 08/12/20  0152    138   K 4.2 4.3   * 110*   CO2 20 20   GLUCOSE 106* 150*   BUN 30* 33*   CREATININE 1.11* 1.08*   ANIONGAP 11 8*   LABGLOM 46* 47*   GFRAA 55* 57*   CALCIUM 9.0 9.1   PROBNP 8,944*  --      Recent Labs     08/10/20  1235 08/11/20  0651 08/11/20  2305 08/12/20  0152   PROT  --  4.6*  --  4.6*   LABALBU  --  2.1*  --  2.0*   AST  --  29  --  30   ALT  --  33  --  30   ALKPHOS  --  138*  --  158*   BILITOT  --  0.49  --  0.41   POCGLU 136*  --  158*  --      ABG:  Lab Results   Component Value Date    POCPH 7.409 08/11/2020    POCPCO2 32.4 08/11/2020    POCPO2 47.6 08/11/2020    POCHCO3 20.5 08/11/2020    NBEA 4 08/11/2020    PBEA NOT REPORTED 08/11/2020    DOP3IEY 22 08/11/2020    UAYL0ANU 84 08/11/2020    FIO2 NOT REPORTED 08/11/2020     Lab Results   Component Value Date/Time    SPECIAL LAC 5ML 08/06/2020 01:45 PM     Lab Results   Component Value Date/Time    CULTURE NO GROWTH 6 DAYS 08/06/2020 01:45 PM       Radiology:  Xr Chest (single View Frontal)    Result Date: 8/10/2020  Pulmonary edema pattern has progressed compared to the previous exam. Continued radiographic follow-up recommended to ensure clearance. Xr Knee Left (3 Views)    Result Date: 8/6/2020  No fracture dislocation left knee s/p TKR. Ct Head Wo Contrast    Result Date: 8/6/2020  No acute intracranial abnormality. Chronic appearing paranasal sinus disease. Bilateral mastoiditis. Ct Cervical Spine Wo Contrast    Result Date: 8/6/2020  No acute abnormality of the cervical spine. Probable old compression deformity T4; correlate clinically. Osteopenia, multilevel degenerative changes and additional findings, as described in detail above. Ct Thoracic Spine Wo Contrast    Result Date: 8/6/2020  Chronic appearing moderate-severe compression deformity T4.   An acute element should be considered if there is localized tenderness at this level. No additional thoracic spine fracture identified. Mild-moderate multilevel degenerative and additional findings, as above RECOMMENDATIONS: MRI could be obtained to assess for possible edema, if indicated clinically. Ct Lumbar Spine Wo Contrast    Result Date: 8/6/2020  No acute lumbar spine fracture. Multilevel degenerative changes/DDD, as described. Xr Chest Portable    Result Date: 8/12/2020  No significant interval change with redemonstration of diffuse bilateral heterogeneous opacities. Xr Chest Portable    Result Date: 8/6/2020  Patchy airspace and interstitial findings, better demonstrated on CT. Correlate for edema versus pneumonia. Ct Chest Abdomen Pelvis Wo Contrast    Result Date: 8/7/2020  1. Patchy ground-glass opacification within the lungs, predominantly peribronchial in distribution, possibly mild edema or infiltrate. 2. Atherosclerotic calcification in the aorta and coronary circulation. No aneurysm. 3. Uncomplicated diverticulitis involving the proximal sigmoid colon. No evidence of perforation or abscess. No free fluid. 4. 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 5. T5 compression fracture. See CT of the thoracic and lumbar spine for complete details.        Physical Examination:        General appearance:  Lethargic; looks more ill; did not interact with me and just barely opened eyes briefly  Mental Status: Cannot assess; somnolent    Lungs:  coarse bilaterally, labored effort; tachypneic  Heart:  Tachy, irreg rhythm, no murmur  Abdomen:  soft, nontender, nondistended, normal bowel sounds, no masses, hepatomegaly, splenomegaly  Extremities:  no redness, tenderness in the calves; positive edema  Skin:  no gross lesions, rashes, induration    Assessment:        Hospital Problems           Last Modified POA    * (Principal) Septic shock ([address] 8/7/2020 Yes    Diverticulitis 8/7/2020 Yes    COVID-19 with multiple comorbidities 8/7/2020 Yes Hypothyroid 8/7/2020 Yes    Essential hypertension 8/9/2020 Yes    [place] (hard of hearing) 8/7/2020 Yes    Heart failure ([facility] 8/7/2020 Yes    Falls 8/7/2020 Yes    Controlled type 2 diabetes mellitus with complication, without long-term current use of insulin ([facility] 8/9/2020 Yes    Chronic kidney disease 8/7/2020 Yes    Pneumonia due to COVID-19 virus 8/7/2020 Yes    Transaminitis 8/8/2020 Yes          Plan:        1. Discharge back to [facility]) - she is a long term resident there; will ask hospice to see her there  2.  Called and d/w daughter and she is in agreement with this plan    Pepper Loud Blood, DO  8/13/2020  12:10 PM

## 2020-08-13 NOTE — DISCHARGE INSTR - COC
Continuity of Care Form    Patient Name: Maria Elena Caraballo   :  10/4/1923  MRN:  [id number]    Admit date:  2020  Discharge date:  ***    Code Status Order: DNR-CC   Advance Directives:     Admitting Physician:  Bi Alvarez DO  PCP: Suni Boswell MD    Discharging Nurse: [facility] Unit/Room#: 7713/0697-48  Discharging Unit Phone Number: ***    Emergency Contact:   Extended Emergency Contact Information  Primary Emergency Contact: [address]  [facility], [address] [facility],[address] Phone: [phone]  Relation: Child    Past Surgical History:  History reviewed. No pertinent surgical history. Immunization History: There is no immunization history on file for this patient. Active Problems:  Patient Active Problem List   Diagnosis Code    COVID-19 with multiple comorbidities U07.1    Hypothyroid E03.9    Essential hypertension I10    [place] (hard of hearing) H91.90    Heart failure ([facility] I50.9    Falls W19. [place] Labella    Controlled type 2 diabetes mellitus with complication, without long-term current use of insulin (HCC) E11.8    Chronic kidney disease N18.9    Pneumonia due to COVID-19 virus U07.1, J12.89    PRAFUL (acute kidney injury) ([address] N17.9    Atrial fibrillation (HCC) I48.91    Transaminitis R74.0    Septic shock (HCC) A41.9, R65.21    Diverticulitis K57.92       Isolation/Infection:   Isolation          Droplet Plus        Patient Infection Status     Infection Onset Added Last Indicated Last Indicated By Review Planned Expiration Resolved Resolved By    COVID-19 20 COVID-19  10/02/20      Resolved    COVID-19 Rule Out 20 COVID-19 (Ordered)   20 Rule-Out Test Resulted          Nurse Assessment:  Last Vital Signs: BP (!) 116/47   Pulse 91   Temp 97.9 °F (36.6 °C) (Core)   Resp 26   Ht 5' 3\" (1.6 m)   Wt 169 lb 12.1 oz (77 kg)   SpO2 98%   BMI 30.07 kg/m²     Last documented pain score (0-10 scale): Pain Level: 6  Last Weight:   Wt Readings from Last 1 Encounters:   20 169 lb 12.1 oz (77 kg)     Mental Status:  {IP PT MENTAL STATUS:}    IV Access:  { JOSIANE IV ACCESS:229180455}    Nursing Mobility/ADLs:  Walking   {P DME HOWS:215011456}  Transfer  {P DME NAXZ:198151948}  Bathing  {P DME KUJC:830638776}  Dressing  {P DME FNCT:400575374}  Toileting  {P DME ORRW:260963976}  Feeding  {[facility] DME DPMN:251384211}  Med Admin  {[facility] DME LCFI:712986822}  Med Delivery   { JOSIANE MED Delivery:427516892}    Wound Care Documentation and Therapy:  Wound 20 Hand Posterior (Active)   Wound Skin Tear 08/10/20 1300   Dressing Status Other (Comment) 20 0430   Dressing Changed Other (Comment) 20 0430   Dressing/Treatment Open to air 08/10/20 1700   Wound Assessment Dry;Clean;Pink;Fragile 20   Drainage Amount None 20   Drainage Description Sanguinous 20   Odor None 20   Joellen-wound Assessment Clean;Dry;Ecchymosis;Fragile 20   Number of days: 6       Wound 20 Sacrum Erythema (Active)   Wound Pressure Stage  1 08/10/20 1300   Dressing Status Clean;Dry; Intact 20 0300   Dressing Changed Changed/New 20 1600   Dressing/Treatment Foam;Protective barrier 20 0300   Wound Assessment Clean;Dry;Non-blanchable erythema;Pink;Red 20 0300   Drainage Amount None 20 0300   Joellen-wound Assessment Clean;Dry; Intact; Pink 20 0300   Number of days: 6        Elimination:  Continence:   · Bowel: {YES / G}  · Bladder: {YES / VH:77816}  Urinary Catheter: {Urinary Catheter:503854012}   Colostomy/Ileostomy/Ileal Conduit: {YES / EX:67249}  [REMOVED] Fecal Management System-Stool Appearance: Loose, Watery  [REMOVED] Fecal Management System-Stool Color: Brown  [REMOVED] Fecal Management System-Stool Amount: Smear    Date of Last BM: ***    Intake/Output Summary (Last 24 hours) at 2020 1200  Last data filed at 2020 0641  Gross per 24 hour   Intake --   Output 1450 ml Certification: I certify the above information and transfer of Ericka Leroy  is necessary for the continuing treatment of the diagnosis listed and that she requires Intermediate Nursing Care for less 30 days.      Update Admission H&P: No change in H&P    PHYSICIAN SIGNATURE:  Electronically signed by Greg Cabral DO on 8/13/20 at 12:18 PM EDT

## 2020-08-13 NOTE — PROGRESS NOTES
DATE: 2020    NAME: Stephon Bonilla  MRN: [id number]   : 10/4/1923    Patient not seen this date for Physical Therapy due to:  [] Blood transfusion in progress  [] Hemodialysis  []  Patient Declined  [] Spine Precautions   [] Strict Bedrest  [] Surgery/ Procedure  [] Testing      [x] Other RN reports that the pt is not appropriate at this time. Pt's O2 desats, from moving in bed. [] PT being discontinued at this time. Patient independent. No further needs. [] PT being discontinued at this time as the patient has been transferred to palliative care. No further needs.     Scot Humaira, PTA

## 2020-08-13 NOTE — PROGRESS NOTES
Comprehensive Nutrition Assessment    Type and Reason for Visit:  Reassess    Nutrition Recommendations/Plan: Will increase supplements to 4 per day    Nutrition Assessment:  Pt takes only bites of solid food, but drinks Glucerna supplements consistently. Nursing requests more supplements. Malnutrition Assessment:  Malnutrition Status:  Insufficient data    Context:  Acute Illness     Findings of the 6 clinical characteristics of malnutrition:  Energy Intake:  Unable to assess  Weight Loss:  Unable to assess     Body Fat Loss:  Unable to assess     Muscle Mass Loss:  Unable to assess    Fluid Accumulation:  1 - Mild Extremities   Strength:  Not Performed    Estimated Daily Nutrient Needs:  Energy (kcal):  1.2-1.3 ~> 6124-7945 kcals/d; Weight Used for Energy Requirements:  Admission     Protein (g):  1.2-1.4 gm/kg ~> 63-73 gms/d; Weight Used for Protein Requirements:  Ideal        Nutrition Related Findings:  labs/meds reviewed      Wounds:  Multiple, Open Wounds, Skin Tears       Current Nutrition Therapies:    DIET GENERAL; Carb Control: 4 carb choices (60 gms)/meal  Dietary Nutrition Supplements: Diabetic Oral Supplement    Anthropometric Measures:  · Height: 5' 3\" (160 cm)  · Current Body Weight: 169 lb (76.7 kg)   · Admission Body Weight: 148 lb (67.1 kg)    · Ideal Body Weight: 115 lbs; % Ideal Body Weight 128.7 %   · BMI: 29.9  · BMI Categories: Overweight (BMI 25.0-29. 9)       Nutrition Diagnosis:   · Increased nutrient needs related to increase demand for energy/nutrients(wound healing) as evidenced by intake 0-25%    Nutrition Interventions:   Food and/or Nutrient Delivery:  Continue Current Diet, Modify Oral Nutrition Supplement  Nutrition Education/Counseling:  No recommendation at this time   Coordination of Nutrition Care:  No recommendation at this time    Goals:  Pt to consume >75% of est'd needs via PO       Nutrition Monitoring and Evaluation:   Food/Nutrient Intake Outcomes:  Food and Nutrient Intake, Supplement Intake  Physical Signs/Symptoms Outcomes:  Biochemical Data, Weight, Skin, Nutrition Focused Physical Findings, GI Status     Discharge Planning:    Continue Oral Nutrition Supplement     Electronically signed by Sierra hCerry RD, TYE on 8/13/20 at 1:41 PM EDT    Contact: [phone]

## 2020-08-13 NOTE — DISCHARGE SUMMARY
Gill Tanner [facility]    Discharge Summary     Patient ID: Gonsalo Foy  :  10/4/1923   MRN: [id number]     ACCOUNT:  [de-identified]   Patient's PCP: Marito Palomares MD  Admit Date: 2020   Discharge Date: 2020     Length of Stay: 8  Code Status:  DNR-CC  Admitting Physician: Dami Doran, DO  Discharge Physician: Adrienne Cabral DO     Active Discharge Diagnoses:     Hospital Problem Lists:  Principal Problem:    Septic shock ([address]  Active Problems:    Diverticulitis    COVID-19 with multiple comorbidities    Hypothyroid    Essential hypertension    [place] (hard of hearing)    Heart failure ([address]    Falls    Controlled type 2 diabetes mellitus with complication, without long-term current use of insulin ([address]    Chronic kidney disease    Pneumonia due to COVID-19 virus    Transaminitis  Resolved Problems:    * No resolved hospital problems. *      Admission Condition:  critical     Discharged Condition: poor    Hospital Stay:     Hospital Course:  Gonsalo Foy is a 80 y.o. female who was admitted for the management of   Septic shock ([address] , presented to ER with Loss of Consciousness (had syncopal episode on bathroom, incontinent of bowels. neck pain. low bp, dopamine started by ems. shut off by arrival)    Admitted with shock, presumed septic. Placed on pressors and also on cipro/flagyl for diverticulitis seen on ct scan. She also tested positive for covid but was not initially hypoxic, but did have changes on ct scan of chest    She was weaned off of pressors but respiratory status decompensated and she was placed on bipap and had tachypnea. In discussion with family, they elected for dnrcc and no invasive procedures, and no intubation. She was taken off bipap. Her mental status also declined and her family decided on hospice care.   Because of her high o2 requirements, the ecf could not take her back to her bed here will be converted to a hospice bed      Significant therapeutic interventions: see above    Significant Diagnostic Studies:   Labs / Micro:  CBC:   Lab Results   Component Value Date    WBC 9.6 08/12/2020    RBC 3.19 08/12/2020    HGB 10.4 08/12/2020    HCT 31.9 08/12/2020    .0 08/12/2020    MCH 32.6 08/12/2020    MCHC 32.6 08/12/2020    RDW 14.4 08/12/2020     08/12/2020     CMP:    Lab Results   Component Value Date    GLUCOSE 150 08/12/2020     08/12/2020    K 4.3 08/12/2020     08/12/2020    CO2 20 08/12/2020    BUN 33 08/12/2020    CREATININE 1.08 08/12/2020    ANIONGAP 8 08/12/2020    ALKPHOS 158 08/12/2020    ALT 30 08/12/2020    AST 30 08/12/2020    BILITOT 0.41 08/12/2020    LABALBU 2.0 08/12/2020    ALBUMIN 0.8 08/12/2020    LABGLOM 47 08/12/2020    GFRAA 57 08/12/2020    GFR      08/12/2020    GFR NOT REPORTED 08/12/2020    PROT 4.6 08/12/2020    CALCIUM 9.1 08/12/2020        Radiology:  Xr Chest (single View Frontal)    Result Date: 8/10/2020  Pulmonary edema pattern has progressed compared to the previous exam. Continued radiographic follow-up recommended to ensure clearance. Xr Knee Left (3 Views)    Result Date: 8/6/2020  No fracture dislocation left knee s/p TKR. Ct Head Wo Contrast    Result Date: 8/6/2020  No acute intracranial abnormality. Chronic appearing paranasal sinus disease. Bilateral mastoiditis. Ct Cervical Spine Wo Contrast    Result Date: 8/6/2020  No acute abnormality of the cervical spine. Probable old compression deformity T4; correlate clinically. Osteopenia, multilevel degenerative changes and additional findings, as described in detail above. Ct Thoracic Spine Wo Contrast    Result Date: 8/6/2020  Chronic appearing moderate-severe compression deformity T4. An acute element should be considered if there is localized tenderness at this level. No additional thoracic spine fracture identified.   Mild-moderate multilevel degenerative and additional findings, as above RECOMMENDATIONS: MRI could be obtained to assess for possible edema, if indicated clinically. Ct Lumbar Spine Wo Contrast    Result Date: 8/6/2020  No acute lumbar spine fracture. Multilevel degenerative changes/DDD, as described. Xr Chest Portable    Result Date: 8/12/2020  No significant interval change with redemonstration of diffuse bilateral heterogeneous opacities. Xr Chest Portable    Result Date: 8/6/2020  Patchy airspace and interstitial findings, better demonstrated on CT. Correlate for edema versus pneumonia. Ct Chest Abdomen Pelvis Wo Contrast    Result Date: 8/7/2020  1. Patchy ground-glass opacification within the lungs, predominantly peribronchial in distribution, possibly mild edema or infiltrate. 2. Atherosclerotic calcification in the aorta and coronary circulation. No aneurysm. 3. Uncomplicated diverticulitis involving the proximal sigmoid colon. No evidence of perforation or abscess. No free fluid. 4. 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 5. T5 compression fracture. See CT of the thoracic and lumbar spine for complete details. Consultations:    Consults:     Final Specialist Recommendations/Findings:   IP CONSULT TO CARDIOLOGY  IP CONSULT TO INFECTIOUS DISEASES  IP CONSULT TO IV TEAM  IP CONSULT TO HOSPICE  IP CONSULT TO HOSPICE      The patient was seen and examined on day of discharge and this discharge summary is in conjunction with any daily progress note from day of discharge. Discharge plan:     Disposition: hospice    Physician Follow Up:   Hospice consult         Requiring Further Evaluation/Follow Up POST HOSPITALIZATION/Incidental Findings: hospice    Diet: as emmanuelle    Activity: As tolerated    Instructions to Patient: n/a    Discharge Medications:      Medication List      START taking these medications    miconazole 2 % powder  Commonly known as:  MICOTIN  Apply topically 2 times daily.         CONTINUE taking these medications    acetaminophen 325 MG tablet  Commonly known as:  TYLENOL     albuterol (2.5 MG/3ML) 0.083% nebulizer solution  Commonly known as:  PROVENTIL     atorvastatin 20 MG tablet  Commonly known as:  LIPITOR     bumetanide 1 MG tablet  Commonly known as:  BUMEX     diphenhydrAMINE-zinc acetate 1-0.1 % cream  Commonly known as:  BENADRYL     fluticasone 27.5 MCG/SPRAY nasal spray  Commonly known as:  VERAMYST     ipratropium-albuterol 0.5-2.5 (3) MG/3ML Soln nebulizer solution  Commonly known as:  DUONEB     polyethylene glycol 17 g packet  Commonly known as:  GLYCOLAX        STOP taking these medications    Benefiber Powd     cetirizine 10 MG tablet  Commonly known as:  ZYRTEC     docusate sodium 100 MG capsule  Commonly known as:  COLACE     ferrous sulfate 325 (65 Fe) MG tablet  Commonly known as:  IRON 325     glimepiride 1 MG tablet  Commonly known as:  AMARYL     Lactobacillus Tabs           Where to Get Your Medications      Information about where to get these medications is not yet available    Ask your nurse or doctor about these medications  · miconazole 2 % powder         No discharge procedures on file. Time Spent on discharge is  33 mins in patient examination, evaluation, counseling as well as medication reconciliation, prescriptions for required medications, discharge plan and follow up. Electronically signed by   Tenzin Cabral DO  8/14/2020  11:05 AM      Thank you Dr. Fariba Colunga MD for the opportunity to be involved in this patient's care.

## 2020-08-13 NOTE — CARE COORDINATION
TRANSITIONAL CARE PLANNING/ 2 Rehab Trace Day: 7    Reason for Admission: COVID-19 with multiple comorbidities [U07.1]  Septic shock ([address] [A41.9, R65.21]     Treatment Plan of Care: dnrcc, palliative, 5L NC        Readmission Risk            Risk of Unplanned Readmission:      17            Patient goals/Treatment Preferences/Transitional Plan: dr blood rounding anticipate she is ready to return to [facility] today. Called central intake spoke to toño will review chart and call back  12:53 called daughter judd. Said her mom has been at [employer] for 14mos and is pending medicaid,  discussed dc plan agreeable to return to [facility]. would like referral to [facility] hospice spoke to [facility] [address] faxed chart notes   14:25 called [facility] hospice confirmed receipt of chart notes. Spoke to lisette with central intake requiring o2 sat to be within 93-95% on 4l for 24 before she can accept back to [facility] even under hospice/dnr status. 14:58 called mika whaley regarding  admission criteria agreeable to have patient evaluated for inpatient hospice nwo/ebmoded no preference. Called [facility] hospice spoke to joanne will review chart. Called [facility] spoke to johana will have rep from [facility] call me back to give clinicals. 15:59 spoke to Dixon Driscoll 83 with [facility] hospice [phone] discussed clinicals case still under review. spoke  to [facility] with maddie discussed clinicals [phone] faxed chart notes. 17:27 rizwan with [facility] hospice has accepted patient and will convert bed and admit when bed is available, e[facility] notified to cancel referral. Called nwo spoke to joanne per black supervisor gave permission for nwo to come on unit. 18:01 called daughter Kem Weber to update on bed conversion nwo rep with her getting consent signed.

## 2020-08-14 PROBLEM — E43 SEVERE PROTEIN-CALORIE MALNUTRITION (HCC): Status: ACTIVE | Noted: 2020-01-01

## 2020-08-14 NOTE — FLOWSHEET NOTE
Assessment: Patient is in [facility] isolation room and not awake/alert/conversational.  Recent code status change and discharged and readmitted in care of Hospice. Intervention:  responded to referral from Hospice and attempted visit. Consulted with interdisciplinary team.  Due to isolation precautions and patient condition, no direct conversation with patient. Outcome and Plan: Chaplains remain available to assist as needed/requested.        08/14/20 1200   Encounter Summary   Services provided to: Patient not available   Referral/Consult From: Other disciplines   Continue Visiting   (8/14/2020)   Complexity of Encounter Low   Length of Encounter 15 minutes   Routine   Type Follow up   Assessment Unable to respond   Intervention Sustaining presence/ Ministry of presence   Outcome Did not respond        08/14/20 1200   Encounter Summary   Services provided to: Patient not available   Referral/Consult From: Other disciplines   Continue Visiting   (8/14/2020)   Complexity of Encounter Low   Length of Encounter 15 minutes   Routine   Type Follow up   Assessment Unable to respond   Intervention Sustaining presence/ Ministry of presence   Outcome Did not respond

## 2020-08-14 NOTE — H&P
MG/3ML) 0.083% nebulizer solution Take 2.5 mg by nebulization every 4 hours as needed for Wheezing (1 application inhale orally via nebulizer every 4 hours as needed for wheezing)    Historical Provider, MD   atorvastatin (LIPITOR) 20 MG tablet Take 20 mg by mouth daily Give 20 mg by mouth at bedtime for high cholesterol    Historical Provider, MD   diphenhydrAMINE-zinc acetate (BENADRYL) 1-0.1 % cream Apply topically every 6 hours as needed for Itching (Apply to LLE topically every 6 hours as needed for itching)    Historical Provider, MD   bumetanide (BUMEX) 1 MG tablet Take 1 mg by mouth daily Give 1 mg by mouth one time a day for edema related to heart failure, unspecified. Historical Provider, MD   fluticasone (VERAMYST) 27.5 MCG/SPRAY nasal spray 1 spray by Each Nostril route daily 1 spray in both nostrils one time a day for allergies    Historical Provider, MD   polyethylene glycol (GLYCOLAX) 17 g packet Take 17 g by mouth daily as needed for Constipation Give 17 gram by mouth every 24 hours as needed for constipation    Historical Provider, MD   ipratropium-albuterol (DUONEB) 0.5-2.5 (3) MG/3ML SOLN nebulizer solution Inhale 1 vial into the lungs every 6 hours as needed for Shortness of Breath or Other (1 application inhale orally every 6 hours as needed for SOB or wheezing)    Historical Provider, MD        Allergies:     Patient has no known allergies. Social History:     Tobacco:    reports that she has never smoked. She has never used smokeless tobacco.  Alcohol:      reports previous alcohol use. Drug Use:  reports previous drug use. Family History:     No family history on file. Review of Systems:     unobtainable      Physical Exam:   There were no vitals taken for this visit.   Temp (24hrs), Av.3 °F (36.3 °C), Min:97.3 °F (36.3 °C), Max:97.3 °F (36.3 °C)    Recent Labs     20  2305   POCGLU 158*     No intake or output data in the 24 hours ending 20 1119    General Appearance: respirations labored, moderate distress      Patient seen during the COVID-19 pandemic. In attempt to limit exposure and preserve PPE-- resources saved for most critical of patients. Writer observed patient outside of room. No physical examination performed. Investigations:      Laboratory Testing:  No results found for this or any previous visit (from the past 24 hour(s)). Imaging/Diagnostics:  Xr Chest (single View Frontal)    Result Date: 8/10/2020  Pulmonary edema pattern has progressed compared to the previous exam. Continued radiographic follow-up recommended to ensure clearance. Xr Chest Portable    Result Date: 8/12/2020  No significant interval change with redemonstration of diffuse bilateral heterogeneous opacities. Assessment :      Hospital Problems           Last Modified POA    * (Principal) COVID-19 with multiple comorbidities 8/14/2020 Yes    Diverticulitis 8/14/2020 Yes    Hypothyroid 8/14/2020 Yes    Essential hypertension 8/14/2020 Yes    [place] (hard of hearing) 8/14/2020 Yes    Controlled type 2 diabetes mellitus with complication, without long-term current use of insulin [address] 8/14/2020 Yes    Pneumonia due to COVID-19 virus 8/14/2020 Yes    PRAFUL (acute kidney injury) ([facility] 8/14/2020 Yes    Severe protein-calorie malnutrition ([address] 8/14/2020 Yes          Plan:     Patient status inpatient in the Med/Surge    1. Hospice care  2. Comfort measures  3. Robinul for secretions  4. Ativan/morphine for comfort  5. Lujan to remain in place  6. Transfer to inpatient hospice when bed available    Consultations:   None     Patient is admitted as inpatient status because of co-morbidities listed above, severity of signs and symptoms as outlined, requirement for current medical therapies and most importantly because of direct risk to patient if care not provided in a hospital setting. Expected length of stay > 48 hours.     Rodríguez Cabral DO  8/14/2020  11:19 AM    Copy sent to

## 2020-08-14 NOTE — CARE COORDINATION
TRANSITIONAL CARE PLANNING/ 2 Rehab Trace Day: 8    Reason for Admission: COVID-19 with multiple comorbidities [U07.1]  Septic shock ([address] [A41.9, R65.21]     Treatment Plan of Care: [facility], HOSPICE         Barriers to Discharge: await inpatient covid bed [facility] hospice     Readmission Risk            Risk of Unplanned Readmission:      16            Patient goals/Treatment Preferences/Transitional Plan: [address]

## 2020-08-14 NOTE — PROGRESS NOTES
Occupational Therapy Not Seen Note    DATE: 2020  Name: Liliya Chang  : 10/4/1923  MRN: [id number]    Patient not available for Occupational Therapy due to:    Pt being transferred to palliative care at this time. Spoke with pt/family and OT services to be defered. Pt is DNR-CC per chart/ RN and is moving to hospice care.      Next Scheduled Treatment: OT to sign off at this time     Electronically signed by HAZEL Nassar on 2020 at 10:45 AM

## 2020-08-15 NOTE — PLAN OF CARE
Problem: Airway Clearance - Ineffective  Goal: Achieve or maintain patent airway  Outcome: Ongoing     Problem: Gas Exchange - Impaired  Goal: Absence of hypoxia  Outcome: Ongoing  Goal: Promote optimal lung function  Outcome: Ongoing     Problem: Breathing Pattern - Ineffective  Goal: Ability to achieve and maintain a regular respiratory rate  Outcome: Ongoing     Problem:  Body Temperature -  Risk of, Imbalanced  Goal: Ability to maintain a body temperature within defined limits  Outcome: Ongoing  Goal: Will regain or maintain usual level of consciousness  Outcome: Ongoing  Goal: Complications related to the disease process, condition or treatment will be avoided or minimized  Outcome: Ongoing     Problem: Isolation Precautions - Risk of Spread of Infection  Goal: Prevent transmission of infection  Outcome: Ongoing     Problem: Nutrition Deficits  Goal: Optimize nutrtional status  Outcome: Ongoing     Problem: Risk for Fluid Volume Deficit  Goal: Maintain normal heart rhythm  Outcome: Ongoing  Goal: Maintain absence of muscle cramping  Outcome: Ongoing  Goal: Maintain normal serum potassium, sodium, calcium, phosphorus, and pH  Outcome: Ongoing     Problem: Fatigue  Goal: Verbalize increase energy and improved vitality  Outcome: Ongoing     Problem: Patient Education: Go to Patient Education Activity  Goal: Patient/Family Education  Outcome: Ongoing     Problem: Falls - Risk of:  Goal: Will remain free from falls  Description: Will remain free from falls  Outcome: Ongoing  Goal: Absence of physical injury  Description: Absence of physical injury  Outcome: Ongoing     Problem: Skin Integrity:  Goal: Will show no infection signs and symptoms  Description: Will show no infection signs and symptoms  Outcome: Ongoing  Goal: Absence of new skin breakdown  Description: Absence of new skin breakdown  Outcome: Ongoing

## 2020-08-15 NOTE — PROGRESS NOTES
(36.7 °C), Min:98 °F (36.7 °C), Max:98 °F (36.7 °C)    No results for input(s): POCGLU in the last 72 hours. I/O (24Hr): Intake/Output Summary (Last 24 hours) at 8/15/2020 1154  Last data filed at 8/15/2020 0400  Gross per 24 hour   Intake --   Output 460 ml   Net -460 ml       Labs:  Hematology:No results for input(s): WBC, RBC, HGB, HCT, MCV, MCH, MCHC, RDW, PLT, MPV, SEDRATE, CRP, INR, DDIMER, BP9YUWZY, LABABSO in the last 72 hours. Invalid input(s): PT  Chemistry:No results for input(s): NA, K, CL, CO2, GLUCOSE, BUN, CREATININE, MG, ANIONGAP, LABGLOM, GFRAA, CALCIUM, CAION, PHOS, PSA, PROBNP, TROPHS, CKTOTAL, CKMB, CKMBINDEX, MYOGLOBIN, DIGOXIN, LACTACIDWB in the last 72 hours. No results for input(s): PROT, LABALBU, LABA1C, U1VQZCG, T0IECQE, FT4, TSH, AST, ALT, LDH, GGT, ALKPHOS, LABGGT, BILITOT, BILIDIR, AMMONIA, AMYLASE, LIPASE, LACTATE, CHOL, HDL, LDLCHOLESTEROL, CHOLHDLRATIO, TRIG, VLDL, IRJ12CD, PHENYTOIN, PHENYF, URICACID, POCGLU in the last 72 hours. ABG:  Lab Results   Component Value Date    POCPH 7.409 08/11/2020    POCPCO2 32.4 08/11/2020    POCPO2 47.6 08/11/2020    POCHCO3 20.5 08/11/2020    NBEA 4 08/11/2020    PBEA NOT REPORTED 08/11/2020    AYG3EDV 22 08/11/2020    UTVN9STF 84 08/11/2020    FIO2 NOT REPORTED 08/11/2020     Lab Results   Component Value Date/Time    SPECIAL LAC 5ML 08/06/2020 01:45 PM     Lab Results   Component Value Date/Time    CULTURE NO GROWTH 6 DAYS 08/06/2020 01:45 PM       Radiology:  Xr Chest (single View Frontal)    Result Date: 8/10/2020  Pulmonary edema pattern has progressed compared to the previous exam. Continued radiographic follow-up recommended to ensure clearance. Xr Chest Portable    Result Date: 8/12/2020  No significant interval change with redemonstration of diffuse bilateral heterogeneous opacities. Physical Examination:        Patient seen during the COVID-19 pandemic.   In attempt to limit exposure and preserve PPE-- resources saved for most critical of patients. Writer observed patient outside of room. No physical examination performed but did notice she was tachypneic and was using accessory muscles to breathe      Assessment:        Hospital Problems           Last Modified POA    * (Principal) COVID-19 with multiple comorbidities 8/14/2020 Yes    Diverticulitis 8/14/2020 Yes    Hypothyroid 8/14/2020 Yes    Essential hypertension 8/14/2020 Yes    [place] (hard of hearing) 8/14/2020 Yes    Controlled type 2 diabetes mellitus with complication, without long-term current use of insulin ([address] 8/14/2020 Yes    Pneumonia due to COVID-19 virus 8/14/2020 Yes    PRAFUL (acute kidney injury) ([address] 8/14/2020 Yes    Severe protein-calorie malnutrition ([address] 8/14/2020 Yes          Plan:        1. Comfort measures  2.  Transfer to hospice inpatient if bed becomes available    Maryl Barthel Blood, DO  8/15/2020  11:54 AM

## 2020-08-26 PROBLEM — Z51.5 ADMISSION FOR HOSPICE CARE: Status: ACTIVE | Noted: 2020-08-26
